# Patient Record
Sex: MALE | Race: WHITE | NOT HISPANIC OR LATINO | URBAN - METROPOLITAN AREA
[De-identification: names, ages, dates, MRNs, and addresses within clinical notes are randomized per-mention and may not be internally consistent; named-entity substitution may affect disease eponyms.]

---

## 2017-01-19 ENCOUNTER — OUTPATIENT (OUTPATIENT)
Dept: OUTPATIENT SERVICES | Facility: HOSPITAL | Age: 82
LOS: 1 days | End: 2017-01-19

## 2017-01-19 DIAGNOSIS — Z98.89 OTHER SPECIFIED POSTPROCEDURAL STATES: Chronic | ICD-10-CM

## 2017-01-27 DIAGNOSIS — Z01.20 ENCOUNTER FOR DENTAL EXAMINATION AND CLEANING WITHOUT ABNORMAL FINDINGS: ICD-10-CM

## 2017-02-01 ENCOUNTER — OUTPATIENT (OUTPATIENT)
Dept: OUTPATIENT SERVICES | Facility: HOSPITAL | Age: 82
LOS: 1 days | End: 2017-02-01

## 2017-02-01 DIAGNOSIS — Z98.89 OTHER SPECIFIED POSTPROCEDURAL STATES: Chronic | ICD-10-CM

## 2017-02-15 DIAGNOSIS — Z01.20 ENCOUNTER FOR DENTAL EXAMINATION AND CLEANING WITHOUT ABNORMAL FINDINGS: ICD-10-CM

## 2017-03-03 ENCOUNTER — OUTPATIENT (OUTPATIENT)
Dept: OUTPATIENT SERVICES | Facility: HOSPITAL | Age: 82
LOS: 1 days | End: 2017-03-03

## 2017-03-03 DIAGNOSIS — Z98.89 OTHER SPECIFIED POSTPROCEDURAL STATES: Chronic | ICD-10-CM

## 2017-04-03 DIAGNOSIS — Z01.20 ENCOUNTER FOR DENTAL EXAMINATION AND CLEANING WITHOUT ABNORMAL FINDINGS: ICD-10-CM

## 2017-06-22 ENCOUNTER — INPATIENT (INPATIENT)
Facility: HOSPITAL | Age: 82
LOS: 7 days | Discharge: HOME | DRG: 261 | End: 2017-06-30
Attending: INTERNAL MEDICINE | Admitting: INTERNAL MEDICINE
Payer: MEDICARE

## 2017-06-22 VITALS
RESPIRATION RATE: 16 BRPM | TEMPERATURE: 98 F | SYSTOLIC BLOOD PRESSURE: 116 MMHG | OXYGEN SATURATION: 96 % | HEART RATE: 51 BPM | DIASTOLIC BLOOD PRESSURE: 75 MMHG

## 2017-06-22 DIAGNOSIS — K21.9 GASTRO-ESOPHAGEAL REFLUX DISEASE WITHOUT ESOPHAGITIS: ICD-10-CM

## 2017-06-22 DIAGNOSIS — R00.1 BRADYCARDIA, UNSPECIFIED: ICD-10-CM

## 2017-06-22 DIAGNOSIS — G30.8 OTHER ALZHEIMER'S DISEASE: ICD-10-CM

## 2017-06-22 DIAGNOSIS — N20.0 CALCULUS OF KIDNEY: ICD-10-CM

## 2017-06-22 DIAGNOSIS — E78.5 HYPERLIPIDEMIA, UNSPECIFIED: ICD-10-CM

## 2017-06-22 DIAGNOSIS — Z98.89 OTHER SPECIFIED POSTPROCEDURAL STATES: Chronic | ICD-10-CM

## 2017-06-22 DIAGNOSIS — Z29.9 ENCOUNTER FOR PROPHYLACTIC MEASURES, UNSPECIFIED: ICD-10-CM

## 2017-06-22 DIAGNOSIS — N20.0 CALCULUS OF KIDNEY: Chronic | ICD-10-CM

## 2017-06-22 LAB
ALBUMIN SERPL ELPH-MCNC: 4.1 G/DL — SIGNIFICANT CHANGE UP (ref 3.3–5)
ALP SERPL-CCNC: 65 U/L — SIGNIFICANT CHANGE UP (ref 40–120)
ALT FLD-CCNC: 46 U/L RC — HIGH (ref 10–45)
ANION GAP SERPL CALC-SCNC: 12 MMOL/L — SIGNIFICANT CHANGE UP (ref 5–17)
APTT BLD: 34.7 SEC — SIGNIFICANT CHANGE UP (ref 27.5–37.4)
AST SERPL-CCNC: 29 U/L — SIGNIFICANT CHANGE UP (ref 10–40)
BASE EXCESS BLDV CALC-SCNC: 3.1 MMOL/L — HIGH (ref -2–2)
BASOPHILS # BLD AUTO: 0.1 K/UL — SIGNIFICANT CHANGE UP (ref 0–0.2)
BASOPHILS NFR BLD AUTO: 0.8 % — SIGNIFICANT CHANGE UP (ref 0–2)
BILIRUB SERPL-MCNC: 0.5 MG/DL — SIGNIFICANT CHANGE UP (ref 0.2–1.2)
BUN SERPL-MCNC: 21 MG/DL — SIGNIFICANT CHANGE UP (ref 7–23)
CA-I SERPL-SCNC: 1.3 MMOL/L — SIGNIFICANT CHANGE UP (ref 1.12–1.3)
CALCIUM SERPL-MCNC: 9.8 MG/DL — SIGNIFICANT CHANGE UP (ref 8.4–10.5)
CHLORIDE BLDV-SCNC: 106 MMOL/L — SIGNIFICANT CHANGE UP (ref 96–108)
CHLORIDE SERPL-SCNC: 106 MMOL/L — SIGNIFICANT CHANGE UP (ref 96–108)
CO2 BLDV-SCNC: 32 MMOL/L — HIGH (ref 22–30)
CO2 SERPL-SCNC: 26 MMOL/L — SIGNIFICANT CHANGE UP (ref 22–31)
CREAT SERPL-MCNC: 1.14 MG/DL — SIGNIFICANT CHANGE UP (ref 0.5–1.3)
EOSINOPHIL # BLD AUTO: 0.1 K/UL — SIGNIFICANT CHANGE UP (ref 0–0.5)
EOSINOPHIL NFR BLD AUTO: 1 % — SIGNIFICANT CHANGE UP (ref 0–6)
GAS PNL BLDV: 141 MMOL/L — SIGNIFICANT CHANGE UP (ref 136–145)
GAS PNL BLDV: SIGNIFICANT CHANGE UP
GAS PNL BLDV: SIGNIFICANT CHANGE UP
GLUCOSE BLDV-MCNC: 84 MG/DL — SIGNIFICANT CHANGE UP (ref 70–99)
GLUCOSE SERPL-MCNC: 89 MG/DL — SIGNIFICANT CHANGE UP (ref 70–99)
HCO3 BLDV-SCNC: 30 MMOL/L — HIGH (ref 21–29)
HCT VFR BLD CALC: 43.3 % — SIGNIFICANT CHANGE UP (ref 39–50)
HCT VFR BLDA CALC: 46 % — SIGNIFICANT CHANGE UP (ref 39–50)
HGB BLD CALC-MCNC: 15.1 G/DL — SIGNIFICANT CHANGE UP (ref 13–17)
HGB BLD-MCNC: 14.9 G/DL — SIGNIFICANT CHANGE UP (ref 13–17)
INR BLD: 1.07 RATIO — SIGNIFICANT CHANGE UP (ref 0.88–1.16)
LACTATE BLDV-MCNC: 1.6 MMOL/L — SIGNIFICANT CHANGE UP (ref 0.7–2)
LYMPHOCYTES # BLD AUTO: 2.1 K/UL — SIGNIFICANT CHANGE UP (ref 1–3.3)
LYMPHOCYTES # BLD AUTO: 30.9 % — SIGNIFICANT CHANGE UP (ref 13–44)
MCHC RBC-ENTMCNC: 34.3 GM/DL — SIGNIFICANT CHANGE UP (ref 32–36)
MCHC RBC-ENTMCNC: 35.5 PG — HIGH (ref 27–34)
MCV RBC AUTO: 103 FL — HIGH (ref 80–100)
MONOCYTES # BLD AUTO: 0.8 K/UL — SIGNIFICANT CHANGE UP (ref 0–0.9)
MONOCYTES NFR BLD AUTO: 11.1 % — SIGNIFICANT CHANGE UP (ref 2–14)
NEUTROPHILS # BLD AUTO: 3.9 K/UL — SIGNIFICANT CHANGE UP (ref 1.8–7.4)
NEUTROPHILS NFR BLD AUTO: 56.3 % — SIGNIFICANT CHANGE UP (ref 43–77)
NT-PROBNP SERPL-SCNC: 66 PG/ML — SIGNIFICANT CHANGE UP (ref 0–300)
PCO2 BLDV: 58 MMHG — HIGH (ref 35–50)
PH BLDV: 7.33 — LOW (ref 7.35–7.45)
PLATELET # BLD AUTO: 168 K/UL — SIGNIFICANT CHANGE UP (ref 150–400)
PO2 BLDV: 21 MMHG — LOW (ref 25–45)
POTASSIUM BLDV-SCNC: 4.8 MMOL/L — SIGNIFICANT CHANGE UP (ref 3.5–5)
POTASSIUM SERPL-MCNC: 5.1 MMOL/L — SIGNIFICANT CHANGE UP (ref 3.5–5.3)
POTASSIUM SERPL-SCNC: 5.1 MMOL/L — SIGNIFICANT CHANGE UP (ref 3.5–5.3)
PROT SERPL-MCNC: 7.2 G/DL — SIGNIFICANT CHANGE UP (ref 6–8.3)
PROTHROM AB SERPL-ACNC: 11.7 SEC — SIGNIFICANT CHANGE UP (ref 9.8–12.7)
RBC # BLD: 4.19 M/UL — LOW (ref 4.2–5.8)
RBC # FLD: 12.2 % — SIGNIFICANT CHANGE UP (ref 10.3–14.5)
SAO2 % BLDV: 24 % — LOW (ref 67–88)
SODIUM SERPL-SCNC: 144 MMOL/L — SIGNIFICANT CHANGE UP (ref 135–145)
TROPONIN T SERPL-MCNC: <0.01 NG/ML — SIGNIFICANT CHANGE UP (ref 0–0.06)
TROPONIN T SERPL-MCNC: <0.01 NG/ML — SIGNIFICANT CHANGE UP (ref 0–0.06)
WBC # BLD: 6.9 K/UL — SIGNIFICANT CHANGE UP (ref 3.8–10.5)
WBC # FLD AUTO: 6.9 K/UL — SIGNIFICANT CHANGE UP (ref 3.8–10.5)

## 2017-06-22 PROCEDURE — 99223 1ST HOSP IP/OBS HIGH 75: CPT

## 2017-06-22 PROCEDURE — 99284 EMERGENCY DEPT VISIT MOD MDM: CPT | Mod: 25

## 2017-06-22 PROCEDURE — 71010: CPT | Mod: 26

## 2017-06-22 PROCEDURE — 93010 ELECTROCARDIOGRAM REPORT: CPT

## 2017-06-22 RX ORDER — QUETIAPINE FUMARATE 200 MG/1
25 TABLET, FILM COATED ORAL AT BEDTIME
Qty: 0 | Refills: 0 | Status: DISCONTINUED | OUTPATIENT
Start: 2017-06-22 | End: 2017-06-30

## 2017-06-22 RX ORDER — DONEPEZIL HYDROCHLORIDE 10 MG/1
10 TABLET, FILM COATED ORAL AT BEDTIME
Qty: 0 | Refills: 0 | Status: DISCONTINUED | OUTPATIENT
Start: 2017-06-22 | End: 2017-06-22

## 2017-06-22 RX ORDER — FAMOTIDINE 10 MG/ML
20 INJECTION INTRAVENOUS
Qty: 0 | Refills: 0 | Status: DISCONTINUED | OUTPATIENT
Start: 2017-06-22 | End: 2017-06-30

## 2017-06-22 RX ORDER — CHOLECALCIFEROL (VITAMIN D3) 125 MCG
4000 CAPSULE ORAL DAILY
Qty: 0 | Refills: 0 | Status: DISCONTINUED | OUTPATIENT
Start: 2017-06-22 | End: 2017-06-30

## 2017-06-22 RX ORDER — POTASSIUM CHLORIDE 20 MEQ
10 PACKET (EA) ORAL DAILY
Qty: 0 | Refills: 0 | Status: DISCONTINUED | OUTPATIENT
Start: 2017-06-22 | End: 2017-06-30

## 2017-06-22 RX ORDER — HEPARIN SODIUM 5000 [USP'U]/ML
5000 INJECTION INTRAVENOUS; SUBCUTANEOUS EVERY 12 HOURS
Qty: 0 | Refills: 0 | Status: DISCONTINUED | OUTPATIENT
Start: 2017-06-22 | End: 2017-06-30

## 2017-06-22 RX ORDER — ATORVASTATIN CALCIUM 80 MG/1
40 TABLET, FILM COATED ORAL AT BEDTIME
Qty: 0 | Refills: 0 | Status: DISCONTINUED | OUTPATIENT
Start: 2017-06-22 | End: 2017-06-30

## 2017-06-22 RX ADMIN — QUETIAPINE FUMARATE 25 MILLIGRAM(S): 200 TABLET, FILM COATED ORAL at 21:13

## 2017-06-22 RX ADMIN — ATORVASTATIN CALCIUM 40 MILLIGRAM(S): 80 TABLET, FILM COATED ORAL at 21:13

## 2017-06-22 RX ADMIN — HEPARIN SODIUM 5000 UNIT(S): 5000 INJECTION INTRAVENOUS; SUBCUTANEOUS at 21:13

## 2017-06-22 NOTE — H&P ADULT - PROBLEM SELECTOR PLAN 1
Sinus bradycardia: symptoms are questionable, ?intermittent lightheadness. No 2nd degree block or pause yet detected.  -will c/w tele monitoring  -1st troponin negative, will send 2nd set  -send TSH, free T4  -2d echo  -f/u cardio recs from Dr Sanju Paulino  -if worsening daniel cardia or blocks will need CCU eval and possible pacing  -hold aricept for now however unlikely contributing to bradycardia   -atropine prn for symptomatic bradycardia  -FULL CODE Sinus bradycardia: symptoms are questionable, ?intermittent lightheadness. No 2nd degree block or pause yet detected.  -will c/w tele monitoring  -1st troponin negative, will send 2nd set  -send TSH, free T4, mg, phos  -2d echo  -f/u cardio recs from Dr Sanju Paulino  -if worsening daniel cardia or blocks will need CCU eval and possible pacing  -hold aricept for now however unlikely contributing to bradycardia   -atropine prn for symptomatic bradycardia  -FULL CODE

## 2017-06-22 NOTE — ED ADULT NURSE REASSESSMENT NOTE - NS ED NURSE REASSESS COMMENT FT1
Report endorsed to KALYANI Hernandez and pt will be adm to [address] [place] [address] [id number]

## 2017-06-22 NOTE — ED PROVIDER NOTE - CONSTITUTIONAL, MLM
normal... Well appearing,  obese, awake, alert, oriented to person, place, time/situation and in no apparent distress.

## 2017-06-22 NOTE — H&P ADULT - ASSESSMENT
82 y/o M with PMH of mild Alzheimer's Dementia, BPH, GERD, HLD, kidney stones, sent in from PMD for bradycardia, found to be bradycardiac, HD stable, possibly symptomatic.

## 2017-06-22 NOTE — H&P ADULT - NEGATIVE CARDIOVASCULAR SYMPTOMS
no chest pain/no peripheral edema/no dyspnea on exertion/no paroxysmal nocturnal dyspnea/no palpitations

## 2017-06-22 NOTE — ED PROVIDER NOTE - MEDICAL DECISION MAKING DETAILS
83 y old male with hypertension and bradycardia  with ECG sinus bradycardia and first degree h block  asymptomatic now ,will needs blood work ,cardiac enzymes tele admission and possible pacemaker ZR

## 2017-06-22 NOTE — H&P ADULT - HISTORY OF PRESENT ILLNESS
84 y/o M with PMH of mild Alzheimer's Dementia, BPH, GERD, HLD, kidney stones, sent in from PMD for bradycardia. Patient himself denies any chest pain, sob, palpitations, lightheadness, syncope, loc, palpitations, n/v today or on previous days. His wife reports patient has had increasing number of falls in past few months. Occasionally patient tells her he is lightheaded and he does not respond to her and seems to "zone out". No f/c/cough/dysuria. At baseline ambulates with RW.     ED vitals:    HR 97.9, HR 51,  /75, 16, 96% on RA  HE was not given anything in ER. 84 y/o M with PMH of mild Alzheimer's Dementia, BPH, GERD, HLD, kidney stones, sent in from PMD for bradycardia. He went to PMD for checkup and not for any specific compliant or reason. Patient himself denies any chest pain, sob, palpitations, lightheadness, syncope, loc, palpitations, n/v today or on previous days. His wife reports patient has had increasing number of falls in past few months but he denies any pain in hips, legs, head, chest now. Occasionally patient tells her he is lightheaded and he does not respond to her and seems to "zone out". No f/c/cough/dysuria. At baseline ambulates with RW.     ED vitals:    HR 97.9, HR 51,  /75, 16, 96% on RA  HE was not given anything in ER.

## 2017-06-22 NOTE — ED ADULT NURSE NOTE - PMH
Alzheimer disease  with psuedobulbular affect  BPH (benign prostatic hypertrophy)    GERD (gastroesophageal reflux disease)    Hyperlipidemia    Renal calculi

## 2017-06-22 NOTE — H&P ADULT - NSHPLABSRESULTS_GEN_ALL_CORE
personally reviewed labs:  CBC, CMP  trop negative  BNP 66  pH 7.33, 58, 30     personally reviewed EKG: sinus daniel @ 44, 1st degree AV block ()    reviewed tele: sinus daniel in 40-60, no ectopy, pause or block    personally reviewed CXR: clear lungs

## 2017-06-23 DIAGNOSIS — I49.5 SICK SINUS SYNDROME: ICD-10-CM

## 2017-06-23 LAB
ANION GAP SERPL CALC-SCNC: 14 MMOL/L — SIGNIFICANT CHANGE UP (ref 5–17)
BUN SERPL-MCNC: 23 MG/DL — SIGNIFICANT CHANGE UP (ref 7–23)
CALCIUM SERPL-MCNC: 9.4 MG/DL — SIGNIFICANT CHANGE UP (ref 8.4–10.5)
CHLORIDE SERPL-SCNC: 104 MMOL/L — SIGNIFICANT CHANGE UP (ref 96–108)
CO2 SERPL-SCNC: 23 MMOL/L — SIGNIFICANT CHANGE UP (ref 22–31)
CREAT SERPL-MCNC: 1.22 MG/DL — SIGNIFICANT CHANGE UP (ref 0.5–1.3)
GLUCOSE SERPL-MCNC: 84 MG/DL — SIGNIFICANT CHANGE UP (ref 70–99)
HCT VFR BLD CALC: 43.6 % — SIGNIFICANT CHANGE UP (ref 39–50)
HGB BLD-MCNC: 14.7 G/DL — SIGNIFICANT CHANGE UP (ref 13–17)
MAGNESIUM SERPL-MCNC: 1.9 MG/DL — SIGNIFICANT CHANGE UP (ref 1.6–2.6)
MCHC RBC-ENTMCNC: 33.7 GM/DL — SIGNIFICANT CHANGE UP (ref 32–36)
MCHC RBC-ENTMCNC: 34.5 PG — HIGH (ref 27–34)
MCV RBC AUTO: 102 FL — HIGH (ref 80–100)
PHOSPHATE SERPL-MCNC: 3.6 MG/DL — SIGNIFICANT CHANGE UP (ref 2.5–4.5)
PLATELET # BLD AUTO: 156 K/UL — SIGNIFICANT CHANGE UP (ref 150–400)
POTASSIUM SERPL-MCNC: 4.2 MMOL/L — SIGNIFICANT CHANGE UP (ref 3.5–5.3)
POTASSIUM SERPL-SCNC: 4.2 MMOL/L — SIGNIFICANT CHANGE UP (ref 3.5–5.3)
RBC # BLD: 4.27 M/UL — SIGNIFICANT CHANGE UP (ref 4.2–5.8)
RBC # FLD: 11.9 % — SIGNIFICANT CHANGE UP (ref 10.3–14.5)
SODIUM SERPL-SCNC: 141 MMOL/L — SIGNIFICANT CHANGE UP (ref 135–145)
T4 AB SER-ACNC: 5.5 UG/DL — SIGNIFICANT CHANGE UP (ref 4.6–12)
TSH SERPL-MCNC: 1.68 UIU/ML — SIGNIFICANT CHANGE UP (ref 0.27–4.2)
WBC # BLD: 6.8 K/UL — SIGNIFICANT CHANGE UP (ref 3.8–10.5)
WBC # FLD AUTO: 6.8 K/UL — SIGNIFICANT CHANGE UP (ref 3.8–10.5)

## 2017-06-23 PROCEDURE — 93880 EXTRACRANIAL BILAT STUDY: CPT | Mod: 26

## 2017-06-23 PROCEDURE — 93010 ELECTROCARDIOGRAM REPORT: CPT

## 2017-06-23 PROCEDURE — 93970 EXTREMITY STUDY: CPT | Mod: 26

## 2017-06-23 PROCEDURE — 99223 1ST HOSP IP/OBS HIGH 75: CPT

## 2017-06-23 PROCEDURE — 93923 UPR/LXTR ART STDY 3+ LVLS: CPT | Mod: 26

## 2017-06-23 RX ADMIN — HEPARIN SODIUM 5000 UNIT(S): 5000 INJECTION INTRAVENOUS; SUBCUTANEOUS at 05:06

## 2017-06-23 RX ADMIN — HEPARIN SODIUM 5000 UNIT(S): 5000 INJECTION INTRAVENOUS; SUBCUTANEOUS at 18:13

## 2017-06-23 RX ADMIN — ATORVASTATIN CALCIUM 40 MILLIGRAM(S): 80 TABLET, FILM COATED ORAL at 22:16

## 2017-06-23 RX ADMIN — Medication 4000 UNIT(S): at 12:30

## 2017-06-23 RX ADMIN — FAMOTIDINE 20 MILLIGRAM(S): 10 INJECTION INTRAVENOUS at 05:06

## 2017-06-23 RX ADMIN — FAMOTIDINE 20 MILLIGRAM(S): 10 INJECTION INTRAVENOUS at 18:13

## 2017-06-23 RX ADMIN — Medication 10 MILLIEQUIVALENT(S): at 12:30

## 2017-06-23 RX ADMIN — QUETIAPINE FUMARATE 25 MILLIGRAM(S): 200 TABLET, FILM COATED ORAL at 22:16

## 2017-06-23 NOTE — CHART NOTE - NSCHARTNOTEFT_GEN_A_CORE
Patient is a 83y old  Male who presents with a chief complaint of sent in for bradycardia from pmd (22 Jun 2017 18:33). Notified by RN that heart rate is 43 on telemetry. Patient admitted with bradycardia. Trend on telemetry is in the 40s-50s. Patient seen and evaluated at the bedside. Asymptomatic, laying in bed. Denies dizziness, lightheadedness, weakness and fatigue.       Vital Signs Last 24 Hrs  T(C): 36.5, Max: 36.9 (06-22 @ 20:56)  T(F): 97.7, Max: 98.4 (06-22 @ 20:56)  HR: 44 (44 - 54)  BP: 109/69 (109/69 - 138/69)  BP(mean): --  RR: 18 (13 - 18)  SpO2: 96% (96% - 98%)                        14.9   6.9   )-----------( 168      ( 22 Jun 2017 16:43 )             43.3     06-22    144  |  106  |  21  ----------------------------<  89  5.1   |  26  |  1.14    Ca    9.8      22 Jun 2017 16:43    TPro  7.2  /  Alb  4.1  /  TBili  0.5  /  DBili  x   /  AST  29  /  ALT  46<H>  /  AlkPhos  65  06-22    PT/INR - ( 22 Jun 2017 16:43 )   PT: 11.7 sec;   INR: 1.07 ratio         PTT - ( 22 Jun 2017 16:43 )  PTT:34.7 sec    General:  NAD  Neurology: A&Ox3  Respiratory: CTA B/L  CV: RR, bradycardic, S1S2, no murmur  Abdominal: Soft, NT, ND no palpable mass      A/P  HPI:  84 y/o M with PMH of mild Alzheimer's Dementia, BPH, GERD, HLD, kidney stones, sent in from PMD for bradycardia. He went to PMD for checkup and not for any specific compliant or reason. Patient himself denies any chest pain, sob, palpitations, lightheadness, syncope, loc, palpitations, n/v today or on previous days. His wife reports patient has had increasing number of falls in past few months but he denies any pain in hips, legs, head, chest now. Occasionally patient tells her he is lightheaded and he does not respond to her and seems to "zone out". No f/c/cough/dysuria. At baseline ambulates with RW.     ED vitals:    HR 97.9, HR 51,  /75, 16, 96% on RA  HE was not given anything in ER. (22 Jun 2017 18:33)  Now with a heart rate of 43 on telemetry.   1) Continue to monitor on telemetry.   2) EKG obtained- sinus bradycardia, no change noted from last EKG in the chart September 2016.   3) Will continue to monitor and follow up with primary team in the AM.       Dorothea Little PA-C   [phone] Patient is a 83y old  Male who presents with a chief complaint of sent in for bradycardia from pmd (22 Jun 2017 18:33). Notified by RN that heart rate is 43 on telemetry. Patient admitted with bradycardia. Trend on telemetry is in the 40s-50s. Patient seen and evaluated at the bedside. Asymptomatic, laying in bed. Denies dizziness, lightheadedness, weakness and fatigue.       Vital Signs Last 24 Hrs  T(C): 36.5, Max: 36.9 (06-22 @ 20:56)  T(F): 97.7, Max: 98.4 (06-22 @ 20:56)  HR: 44 (44 - 54)  BP: 109/69 (109/69 - 138/69)  BP(mean): --  RR: 18 (13 - 18)  SpO2: 96% (96% - 98%)                        14.9   6.9   )-----------( 168      ( 22 Jun 2017 16:43 )             43.3     06-22    144  |  106  |  21  ----------------------------<  89  5.1   |  26  |  1.14    Ca    9.8      22 Jun 2017 16:43    TPro  7.2  /  Alb  4.1  /  TBili  0.5  /  DBili  x   /  AST  29  /  ALT  46<H>  /  AlkPhos  65  06-22    PT/INR - ( 22 Jun 2017 16:43 )   PT: 11.7 sec;   INR: 1.07 ratio         PTT - ( 22 Jun 2017 16:43 )  PTT:34.7 sec    General:  NAD  Neurology: A&Ox3  Respiratory: CTA B/L  CV: RR, bradycardic, S1S2, no murmur  Abdominal: Soft, NT, ND no palpable mass      A/P  HPI:  84 y/o M with PMH of mild Alzheimer's Dementia, BPH, GERD, HLD, kidney stones, sent in from PMD for bradycardia. He went to PMD for checkup and not for any specific compliant or reason. Patient himself denies any chest pain, sob, palpitations, lightheadness, syncope, loc, palpitations, n/v today or on previous days. His wife reports patient has had increasing number of falls in past few months but he denies any pain in hips, legs, head, chest now. Occasionally patient tells her he is lightheaded and he does not respond to her and seems to "zone out". No f/c/cough/dysuria. At baseline ambulates with RW.     ED vitals:    HR 97.9, HR 51,  /75, 16, 96% on RA  HE was not given anything in ER. (22 Jun 2017 18:33)  Now with a heart rate of 43 on telemetry.   1) Continue to monitor on telemetry.   2) EKG obtained- sinus bradycardia, no change noted from last EKG in the chart September 2016.   3) Will continue to monitor and follow up with primary team in the AM.       Dorothea Little PA-C   [phone]    Addendum:   Notified by RN that patient went bradycardic to 39 briefly on telemetry monitoring. Patient seen at the bedside. Asymptomatic. Denies dizziness, lightheadedness and weakness. Exam as above. Vital Signs Last 24 Hrs  T(C): 36.6, Max: 36.9 (06-22 @ 20:56)  T(F): 97.8, Max: 98.4 (06-22 @ 20:56)  HR: 43 (43 - 54)  BP: 107/68 (107/68 - 138/69)  BP(mean): --  RR: 18 (13 - 18)  SpO2: 94% (94% - 98%)  Continue to monitor on telemetry.   R2 pads placed on the patient.   Will endorse to primary team in the AM.   Dorothea Little PA-C [id number] Patient is a 83y old  Male who presents with a chief complaint of sent in for bradycardia from pmd (22 Jun 2017 18:33). Notified by RN that heart rate is 43 on telemetry. Patient admitted with bradycardia. Trend on telemetry is in the 40s-50s. Patient seen and evaluated at the bedside. Asymptomatic, laying in bed. Denies dizziness, lightheadedness, weakness and fatigue.       Vital Signs Last 24 Hrs  T(C): 36.5, Max: 36.9 (06-22 @ 20:56)  T(F): 97.7, Max: 98.4 (06-22 @ 20:56)  HR: 44 (44 - 54)  BP: 109/69 (109/69 - 138/69)  BP(mean): --  RR: 18 (13 - 18)  SpO2: 96% (96% - 98%)                        14.9   6.9   )-----------( 168      ( 22 Jun 2017 16:43 )             43.3     06-22    144  |  106  |  21  ----------------------------<  89  5.1   |  26  |  1.14    Ca    9.8      22 Jun 2017 16:43    TPro  7.2  /  Alb  4.1  /  TBili  0.5  /  DBili  x   /  AST  29  /  ALT  46<H>  /  AlkPhos  65  06-22    PT/INR - ( 22 Jun 2017 16:43 )   PT: 11.7 sec;   INR: 1.07 ratio         PTT - ( 22 Jun 2017 16:43 )  PTT:34.7 sec    General:  NAD  Neurology: A&Ox3  Respiratory: CTA B/L  CV: RR, bradycardic, S1S2, no murmur  Abdominal: Soft, NT, ND no palpable mass      A/P  HPI:  84 y/o M with PMH of mild Alzheimer's Dementia, BPH, GERD, HLD, kidney stones, sent in from PMD for bradycardia. He went to PMD for checkup and not for any specific compliant or reason. Patient himself denies any chest pain, sob, palpitations, lightheadness, syncope, loc, palpitations, n/v today or on previous days. His wife reports patient has had increasing number of falls in past few months but he denies any pain in hips, legs, head, chest now. Occasionally patient tells her he is lightheaded and he does not respond to her and seems to "zone out". No f/c/cough/dysuria. At baseline ambulates with RW.     ED vitals:    HR 97.9, HR 51,  /75, 16, 96% on RA  HE was not given anything in ER. (22 Jun 2017 18:33)  Now with a heart rate of 43 on telemetry.   1) Continue to monitor on telemetry.   2) EKG obtained- sinus bradycardia, no change noted from last EKG in the chart September 2016.   3) Will continue to monitor and follow up with primary team in the AM.       Dorothea Little PA-C   [phone]    Addendum:   Notified by RN that patient went bradycardic to 39 briefly on telemetry monitoring. Patient seen at the bedside. Asymptomatic. Denies dizziness, lightheadedness and weakness. Exam as above. Vital Signs Last 24 Hrs  T(C): 36.6, Max: 36.9 (06-22 @ 20:56)  T(F): 97.8, Max: 98.4 (06-22 @ 20:56)  HR: 43 (43 - 54)  BP: 107/68 (107/68 - 138/69)  BP(mean): --  RR: 18 (13 - 18)  SpO2: 94% (94% - 98%)  Continue to monitor on telemetry.   R2 pads placed on the patient.   Will endorse to primary team in the AM.   Dorothea Little PA-C [id number]    Detailed message in regards to bradycardia episode left with Dr. Tafoya answering service.   Dorothea Little PA-C [id number]

## 2017-06-23 NOTE — PROVIDER CONTACT NOTE (OTHER) - ASSESSMENT
Pt sleeping, asymptomatic, denies any lightheadedness, fatigue, SOB. VS are /69, HR 44, PulseOx 94%, Temp 97.7, RR 17
VSS. asymptomatic, pt. was sleeping, HR 40s-50s

## 2017-06-23 NOTE — CONSULT NOTE ADULT - PROBLEM SELECTOR RECOMMENDATION 9
Patient with sinus bradycardia. He is a poor historian so difficult to determine if truly symptomatic. As per wife's history it sounds like he is. Continue tele monitor. Check 2-d echo. Hold all AV deann blockers. EP evaluation.

## 2017-06-23 NOTE — PROVIDER CONTACT NOTE (OTHER) - BACKGROUND
admitted with bradycardia
Pt admitted with bradycardia, has a history of alzheimer's, dementia, renal calculi, GERD, BPH.

## 2017-06-24 LAB
ANION GAP SERPL CALC-SCNC: 15 MMOL/L — SIGNIFICANT CHANGE UP (ref 5–17)
BASOPHILS # BLD AUTO: 0 K/UL — SIGNIFICANT CHANGE UP (ref 0–0.2)
BASOPHILS NFR BLD AUTO: 0.6 % — SIGNIFICANT CHANGE UP (ref 0–2)
BUN SERPL-MCNC: 27 MG/DL — HIGH (ref 7–23)
CALCIUM SERPL-MCNC: 9.5 MG/DL — SIGNIFICANT CHANGE UP (ref 8.4–10.5)
CHLORIDE SERPL-SCNC: 104 MMOL/L — SIGNIFICANT CHANGE UP (ref 96–108)
CO2 SERPL-SCNC: 23 MMOL/L — SIGNIFICANT CHANGE UP (ref 22–31)
CREAT SERPL-MCNC: 1.32 MG/DL — HIGH (ref 0.5–1.3)
EOSINOPHIL # BLD AUTO: 0.1 K/UL — SIGNIFICANT CHANGE UP (ref 0–0.5)
EOSINOPHIL NFR BLD AUTO: 2 % — SIGNIFICANT CHANGE UP (ref 0–6)
GLUCOSE SERPL-MCNC: 84 MG/DL — SIGNIFICANT CHANGE UP (ref 70–99)
HCT VFR BLD CALC: 45.7 % — SIGNIFICANT CHANGE UP (ref 39–50)
HGB BLD-MCNC: 14.9 G/DL — SIGNIFICANT CHANGE UP (ref 13–17)
LYMPHOCYTES # BLD AUTO: 2.3 K/UL — SIGNIFICANT CHANGE UP (ref 1–3.3)
LYMPHOCYTES # BLD AUTO: 35.9 % — SIGNIFICANT CHANGE UP (ref 13–44)
MAGNESIUM SERPL-MCNC: 1.9 MG/DL — SIGNIFICANT CHANGE UP (ref 1.6–2.6)
MCHC RBC-ENTMCNC: 32.6 GM/DL — SIGNIFICANT CHANGE UP (ref 32–36)
MCHC RBC-ENTMCNC: 33.1 PG — SIGNIFICANT CHANGE UP (ref 27–34)
MCV RBC AUTO: 101 FL — HIGH (ref 80–100)
MONOCYTES # BLD AUTO: 0.7 K/UL — SIGNIFICANT CHANGE UP (ref 0–0.9)
MONOCYTES NFR BLD AUTO: 10.8 % — SIGNIFICANT CHANGE UP (ref 2–14)
NEUTROPHILS # BLD AUTO: 3.2 K/UL — SIGNIFICANT CHANGE UP (ref 1.8–7.4)
NEUTROPHILS NFR BLD AUTO: 50.7 % — SIGNIFICANT CHANGE UP (ref 43–77)
PHOSPHATE SERPL-MCNC: 4 MG/DL — SIGNIFICANT CHANGE UP (ref 2.5–4.5)
PLATELET # BLD AUTO: 154 K/UL — SIGNIFICANT CHANGE UP (ref 150–400)
POTASSIUM SERPL-MCNC: 4.1 MMOL/L — SIGNIFICANT CHANGE UP (ref 3.5–5.3)
POTASSIUM SERPL-SCNC: 4.1 MMOL/L — SIGNIFICANT CHANGE UP (ref 3.5–5.3)
RBC # BLD: 4.5 M/UL — SIGNIFICANT CHANGE UP (ref 4.2–5.8)
RBC # FLD: 11.7 % — SIGNIFICANT CHANGE UP (ref 10.3–14.5)
SODIUM SERPL-SCNC: 142 MMOL/L — SIGNIFICANT CHANGE UP (ref 135–145)
WBC # BLD: 6.4 K/UL — SIGNIFICANT CHANGE UP (ref 3.8–10.5)
WBC # FLD AUTO: 6.4 K/UL — SIGNIFICANT CHANGE UP (ref 3.8–10.5)

## 2017-06-24 PROCEDURE — 99232 SBSQ HOSP IP/OBS MODERATE 35: CPT

## 2017-06-24 RX ADMIN — HEPARIN SODIUM 5000 UNIT(S): 5000 INJECTION INTRAVENOUS; SUBCUTANEOUS at 05:38

## 2017-06-24 RX ADMIN — Medication 4000 UNIT(S): at 11:38

## 2017-06-24 RX ADMIN — HEPARIN SODIUM 5000 UNIT(S): 5000 INJECTION INTRAVENOUS; SUBCUTANEOUS at 17:58

## 2017-06-24 RX ADMIN — FAMOTIDINE 20 MILLIGRAM(S): 10 INJECTION INTRAVENOUS at 05:38

## 2017-06-24 RX ADMIN — ATORVASTATIN CALCIUM 40 MILLIGRAM(S): 80 TABLET, FILM COATED ORAL at 21:19

## 2017-06-24 RX ADMIN — Medication 10 MILLIEQUIVALENT(S): at 11:39

## 2017-06-24 RX ADMIN — QUETIAPINE FUMARATE 25 MILLIGRAM(S): 200 TABLET, FILM COATED ORAL at 21:19

## 2017-06-24 RX ADMIN — FAMOTIDINE 20 MILLIGRAM(S): 10 INJECTION INTRAVENOUS at 17:58

## 2017-06-25 LAB
ANION GAP SERPL CALC-SCNC: 14 MMOL/L — SIGNIFICANT CHANGE UP (ref 5–17)
BUN SERPL-MCNC: 24 MG/DL — HIGH (ref 7–23)
CALCIUM SERPL-MCNC: 9.8 MG/DL — SIGNIFICANT CHANGE UP (ref 8.4–10.5)
CHLORIDE SERPL-SCNC: 104 MMOL/L — SIGNIFICANT CHANGE UP (ref 96–108)
CO2 SERPL-SCNC: 23 MMOL/L — SIGNIFICANT CHANGE UP (ref 22–31)
CREAT SERPL-MCNC: 1.27 MG/DL — SIGNIFICANT CHANGE UP (ref 0.5–1.3)
GLUCOSE SERPL-MCNC: 87 MG/DL — SIGNIFICANT CHANGE UP (ref 70–99)
HCT VFR BLD CALC: 45 % — SIGNIFICANT CHANGE UP (ref 39–50)
HGB BLD-MCNC: 15.5 G/DL — SIGNIFICANT CHANGE UP (ref 13–17)
MAGNESIUM SERPL-MCNC: 2 MG/DL — SIGNIFICANT CHANGE UP (ref 1.6–2.6)
MCHC RBC-ENTMCNC: 34.4 GM/DL — SIGNIFICANT CHANGE UP (ref 32–36)
MCHC RBC-ENTMCNC: 35 PG — HIGH (ref 27–34)
MCV RBC AUTO: 102 FL — HIGH (ref 80–100)
PHOSPHATE SERPL-MCNC: 3.2 MG/DL — SIGNIFICANT CHANGE UP (ref 2.5–4.5)
PLATELET # BLD AUTO: 145 K/UL — LOW (ref 150–400)
POTASSIUM SERPL-MCNC: 4.2 MMOL/L — SIGNIFICANT CHANGE UP (ref 3.5–5.3)
POTASSIUM SERPL-SCNC: 4.2 MMOL/L — SIGNIFICANT CHANGE UP (ref 3.5–5.3)
RBC # BLD: 4.43 M/UL — SIGNIFICANT CHANGE UP (ref 4.2–5.8)
RBC # FLD: 11.8 % — SIGNIFICANT CHANGE UP (ref 10.3–14.5)
SODIUM SERPL-SCNC: 141 MMOL/L — SIGNIFICANT CHANGE UP (ref 135–145)
WBC # BLD: 7 K/UL — SIGNIFICANT CHANGE UP (ref 3.8–10.5)
WBC # FLD AUTO: 7 K/UL — SIGNIFICANT CHANGE UP (ref 3.8–10.5)

## 2017-06-25 PROCEDURE — 99232 SBSQ HOSP IP/OBS MODERATE 35: CPT

## 2017-06-25 PROCEDURE — 72170 X-RAY EXAM OF PELVIS: CPT | Mod: 26

## 2017-06-25 RX ADMIN — Medication 10 MILLIEQUIVALENT(S): at 10:15

## 2017-06-25 RX ADMIN — ATORVASTATIN CALCIUM 40 MILLIGRAM(S): 80 TABLET, FILM COATED ORAL at 21:15

## 2017-06-25 RX ADMIN — QUETIAPINE FUMARATE 25 MILLIGRAM(S): 200 TABLET, FILM COATED ORAL at 21:15

## 2017-06-25 RX ADMIN — FAMOTIDINE 20 MILLIGRAM(S): 10 INJECTION INTRAVENOUS at 05:53

## 2017-06-25 RX ADMIN — HEPARIN SODIUM 5000 UNIT(S): 5000 INJECTION INTRAVENOUS; SUBCUTANEOUS at 05:53

## 2017-06-25 RX ADMIN — FAMOTIDINE 20 MILLIGRAM(S): 10 INJECTION INTRAVENOUS at 17:00

## 2017-06-25 RX ADMIN — HEPARIN SODIUM 5000 UNIT(S): 5000 INJECTION INTRAVENOUS; SUBCUTANEOUS at 17:00

## 2017-06-25 RX ADMIN — Medication 4000 UNIT(S): at 10:15

## 2017-06-25 NOTE — CHART NOTE - NSCHARTNOTEFT_GEN_A_CORE
Medicine Night PA Episodic  4:20 AM 6/25    Notified by RN pt s/p unwitnessed fall, found on the ground. Pt. seen and examined, resting in bed comfortable, NAD. States was trying to go to the bathroom and bent down on floor to  piece of paper and fell. Pt. denies LOC, dizziness, hitting head, numbness/tingling, cp, palpitations, dyspnea, headache. Full fall assessment examination and evaluation in chart, pt. with no concerning findings.  Pt. noted to have small skin tear noted to left hand between 1st and 2nd digit - dressed appropriately. Urgent Pelvis XR done - no acute/emergent findings. Family and attending updated on pt. change in status. Will continue to monitor and f/u with primary day team in AM.    CARLOZ RitterC  #[id number] Medicine Night PA Episodic  4:20 AM 6/25    Notified by RN pt s/p unwitnessed fall, found on the ground. Pt. seen and examined, resting in bed comfortable, NAD. States was trying to go to the bathroom and bent down on floor to  piece of paper and fell. Pt. denies LOC, dizziness, hitting head, numbness/tingling, cp, palpitations, dyspnea, headache. Full fall assessment examination and evaluation in chart, pt. with no concerning findings.  Pt. noted to have small skin tear noted to left hand between 1st and 2nd digit - dressed appropriately. Urgent Pelvis XR done - no acute/emergent findings. Family updated and Dr. Tafoya paged x 1, awaiting response. Will continue to monitor and f/u with primary day team in AM.    Aleyda Bradford PA-C  #[id number]

## 2017-06-25 NOTE — PHYSICAL THERAPY INITIAL EVALUATION ADULT - ADDITIONAL COMMENTS
Patient reports he has had occasional number of falls in past few month. At baseline ambulates with Rollator and requires to sit down every 1-2 blocks. Pt. resides with family, states he is indep with functional mobility and son assists with Instrumental ADLs (cooking/cleaning/ showering). Has stairs but does not need to negotiate the stairs as bedroom and bathroom are on 1st floor.

## 2017-06-25 NOTE — PHYSICAL THERAPY INITIAL EVALUATION ADULT - PERTINENT HX OF CURRENT PROBLEM, REHAB EVAL
84 y/o M with PMH of mild Alzheimer's Dementia, BPH, GERD, HLD, kidney stones, sent in from PMD for bradycardia.

## 2017-06-25 NOTE — PHYSICAL THERAPY INITIAL EVALUATION ADULT - MANUAL MUSCLE TESTING RESULTS, REHAB EVAL
no strength deficits were identified/4/5 Throughout all extremities no strength deficits were identified/3+/5 Throughout all extremities

## 2017-06-25 NOTE — PHYSICAL THERAPY INITIAL EVALUATION ADULT - DISCHARGE DISPOSITION, PT EVAL
Home with home PT for gait, endurance & strength training and to return pt to baseline functional mobility status. Pt owns rollator. Supervision/assist with mobility skills at this time (pt states Son provides)./home w/ home PT

## 2017-06-26 ENCOUNTER — TRANSCRIPTION ENCOUNTER (OUTPATIENT)
Age: 82
End: 2017-06-26

## 2017-06-26 LAB
ANION GAP SERPL CALC-SCNC: 14 MMOL/L — SIGNIFICANT CHANGE UP (ref 5–17)
BUN SERPL-MCNC: 24 MG/DL — HIGH (ref 7–23)
CALCIUM SERPL-MCNC: 9.6 MG/DL — SIGNIFICANT CHANGE UP (ref 8.4–10.5)
CHLORIDE SERPL-SCNC: 104 MMOL/L — SIGNIFICANT CHANGE UP (ref 96–108)
CO2 SERPL-SCNC: 22 MMOL/L — SIGNIFICANT CHANGE UP (ref 22–31)
CREAT SERPL-MCNC: 1.34 MG/DL — HIGH (ref 0.5–1.3)
GLUCOSE SERPL-MCNC: 90 MG/DL — SIGNIFICANT CHANGE UP (ref 70–99)
HCT VFR BLD CALC: 47.5 % — SIGNIFICANT CHANGE UP (ref 39–50)
HGB BLD-MCNC: 15.9 G/DL — SIGNIFICANT CHANGE UP (ref 13–17)
MAGNESIUM SERPL-MCNC: 2 MG/DL — SIGNIFICANT CHANGE UP (ref 1.6–2.6)
MCHC RBC-ENTMCNC: 33.5 GM/DL — SIGNIFICANT CHANGE UP (ref 32–36)
MCHC RBC-ENTMCNC: 34.8 PG — HIGH (ref 27–34)
MCV RBC AUTO: 104 FL — HIGH (ref 80–100)
PHOSPHATE SERPL-MCNC: 3.5 MG/DL — SIGNIFICANT CHANGE UP (ref 2.5–4.5)
PLATELET # BLD AUTO: 168 K/UL — SIGNIFICANT CHANGE UP (ref 150–400)
POTASSIUM SERPL-MCNC: 4.3 MMOL/L — SIGNIFICANT CHANGE UP (ref 3.5–5.3)
POTASSIUM SERPL-SCNC: 4.3 MMOL/L — SIGNIFICANT CHANGE UP (ref 3.5–5.3)
RBC # BLD: 4.56 M/UL — SIGNIFICANT CHANGE UP (ref 4.2–5.8)
RBC # FLD: 12.1 % — SIGNIFICANT CHANGE UP (ref 10.3–14.5)
SODIUM SERPL-SCNC: 140 MMOL/L — SIGNIFICANT CHANGE UP (ref 135–145)
WBC # BLD: 8.5 K/UL — SIGNIFICANT CHANGE UP (ref 3.8–10.5)
WBC # FLD AUTO: 8.5 K/UL — SIGNIFICANT CHANGE UP (ref 3.8–10.5)

## 2017-06-26 PROCEDURE — 99232 SBSQ HOSP IP/OBS MODERATE 35: CPT

## 2017-06-26 RX ADMIN — FAMOTIDINE 20 MILLIGRAM(S): 10 INJECTION INTRAVENOUS at 17:31

## 2017-06-26 RX ADMIN — Medication 4000 UNIT(S): at 11:48

## 2017-06-26 RX ADMIN — HEPARIN SODIUM 5000 UNIT(S): 5000 INJECTION INTRAVENOUS; SUBCUTANEOUS at 05:40

## 2017-06-26 RX ADMIN — FAMOTIDINE 20 MILLIGRAM(S): 10 INJECTION INTRAVENOUS at 05:40

## 2017-06-26 RX ADMIN — ATORVASTATIN CALCIUM 40 MILLIGRAM(S): 80 TABLET, FILM COATED ORAL at 21:35

## 2017-06-26 RX ADMIN — Medication 10 MILLIEQUIVALENT(S): at 11:48

## 2017-06-26 RX ADMIN — HEPARIN SODIUM 5000 UNIT(S): 5000 INJECTION INTRAVENOUS; SUBCUTANEOUS at 17:31

## 2017-06-26 RX ADMIN — QUETIAPINE FUMARATE 25 MILLIGRAM(S): 200 TABLET, FILM COATED ORAL at 21:35

## 2017-06-26 NOTE — DISCHARGE NOTE ADULT - ADDITIONAL INSTRUCTIONS
Please have patient follow up with his Cardiologist, Dr Riley, within 1 week of discharge  Please have patient follow up with his Primary Care Doctor, Dr Tafoya, within 2 weeks of discharge  Please have patient follow up with his Neurologist, Dr Moreno, within 2 weeks of discharge Please have patient follow up with his Cardiologist/ Electrophysiologist, Dr Riley, within 1 week of discharge  Please have patient follow up with his Primary Care Doctor, Dr Tafoya, within 2 weeks of discharge  Please have patient follow up with his Neurologist, Dr Moreno, within 2 weeks of discharge

## 2017-06-26 NOTE — DISCHARGE NOTE ADULT - PATIENT PORTAL LINK FT
“You can access the [employer] Patient Portal, offered by [facility], by registering with the following website: [email or web address]”

## 2017-06-26 NOTE — DISCHARGE NOTE ADULT - MEDICATION SUMMARY - MEDICATIONS TO CHANGE
I will SWITCH the dose or number of times a day I take the medications listed below when I get home from the hospital:    potassium citrate 15 mEq oral tablet, extended release  -- 1 tab(s) by mouth 2 times a day

## 2017-06-26 NOTE — DISCHARGE NOTE ADULT - HOSPITAL COURSE
***To be completed by Attending Physician*** 83 y old male with a history of mild dementia on Aricept hx of hyperlipidemia and kidney stones sent from PMD, Dr. Tafoya for bradycardia.   82 y/o M with PMH of mild Alzheimer's Dementia, BPH, GERD, HLD, kidney stones, sent in from PMD for bradycardia. He went to PMD for checkup and not for any specific compliant or reason. Patient himself denies any chest pain, sob, palpitations, lightheadness, syncope, loc, palpitations, n/v today or on previous days. His wife reports patient has had increasing number of falls in past few months but he denies any pain in hips, legs, head, chest now. Occasionally patient tells her he is lightheaded and he does not respond to her and seems to "zone out". No f/c/cough/dysuria. At baseline ambulates with RW.     Dx:  Bradycardia-->Hr 40's    6/22	trop x 2 negative   	brief episode of bradycardia to 39- R2 pads placed on patient   6/23 -Bradycardia down into the 30's during the night, R2 pads on. now 40 to 70. TSH wnl,                 pending echo,                carotid doppler, BLE doppler, Aricept which may cause daniel was stopped.                 cardio Dr Hastings evaluating, EP consult pending  6/24 Night: S/P unwittnessed fall bent down to get piece of paper, asymptomatic, physical exam normal - family updated, dr tafoya paged x 1   	- portable hip xray - prelim no acute/emergent findings   06/25 : LE dopplers negative            : s/p fall overnight ,             : Xary pelvis no FX   6/26:	Pt pending Echo.  Neurology consult called secondary to patient with increase  	in number of falls.  Speak with   6/27:	EP called to consult re: Bradycardia/Sick Sinus Syndrome.   	1745:   NPO after MN for Tilt table test tomorrow.   6/28:	s/p Tilt Test.  Loop recorder planted.    6/29:	Pt's HR fluctuating from 50's to 140's bpm.  Will discuss with Cardiology re:  	management.    	1800:  Discharge planning for tomorrow.   spoke with wife.  Wife  	does not want home care services.

## 2017-06-26 NOTE — DISCHARGE NOTE ADULT - SECONDARY DIAGNOSIS.
Fasciculations Dehydration GERD (gastroesophageal reflux disease) Alzheimer's disease High cholesterol Sinus tachycardia Alzheimer's dementia with behavioral disturbance

## 2017-06-26 NOTE — DISCHARGE NOTE ADULT - MEDICATION SUMMARY - MEDICATIONS TO STOP TAKING
I will STOP taking the medications listed below when I get home from the hospital:    Aricept 10 mg oral tablet  -- 1 tab(s) by mouth once a day (at bedtime)    ibuprofen 200 mg oral capsule  --  by mouth 2 times a day

## 2017-06-26 NOTE — DISCHARGE NOTE ADULT - MEDICATION SUMMARY - MEDICATIONS TO TAKE
I will START or STAY ON the medications listed below when I get home from the hospital:    outpatient physical therapy   -- Indication: For physical therapy     Zetia  -- 10 milligram(s) by mouth once a day  -- Indication: For Hyperlipidemia    rosuvastatin 20 mg oral tablet  -- 1 tab(s) by mouth once a week on sunday  -- Indication: For Hyperlipidemia    QUEtiapine 25 mg oral tablet  -- 1 cap(s) by mouth once a day  -- Indication: For Alzheimer's dementia with behavioral disturbance    ranitidine  -- 150 milligram(s) by mouth 2 times a day  -- Indication: For GERD (gastroesophageal reflux disease)    senna oral tablet  -- 2 tab(s) by mouth once a day (at bedtime)  -- Indication: For Stool softener    potassium chloride 10 mEq oral tablet, extended release  -- 1 tab(s) by mouth once a day  -- Indication: For Supplement    Nuedexta 20 mg-10 mg oral capsule  -- 1 cap(s) by mouth every 12 hours  -- Indication: For Alzheimer's dementia with behavioral disturbance    ICaps with Lutein and Zeaxan oral tablet  -- 1 tab(s) by mouth once a day  -- Indication: For Supplement    Vitamin D3 2000 intl units oral capsule  -- 1 cap(s) by mouth 2 times a day  -- Indication: For Supplement

## 2017-06-26 NOTE — DISCHARGE NOTE ADULT - CARE PROVIDER_API CALL
Walter Riley (), Cardiology Medicine  [address]  [place] [address]  Phone: [phone]  Fax: [phone]    Dalton Tafoya (MD), Internal Medicine  [address]  [place] [address]  Phone: [phone]  Fax: [phone]    Elvis Moreno), Electrodiagnostic Medicine; Neurology  [address]  [place] [address]  Phone: [phone]  Fax: [phone]

## 2017-06-26 NOTE — DISCHARGE NOTE ADULT - PLAN OF CARE
optimal therapy A loop recorder was placed to detect specific  arrythmia   Tilt table test negative  Please have patient follow up with his Cardiologist for loop recorder check /Persistent fasciculations;  Please have patient follow up with Neurologist, Dr Moreno, for further workup including EMG Fluid intake encouraged  Patient to follow up with his Primary Care Doctor for continued monitoring HOME CARE INSTRUCTIONS.The care of individuals with dementia is varied and dependent upon the progression of the dementia. The following suggestions are intended for the person living with, or caring for, the person with dementia.Create a safe environment.Remove the locks on bathroom doors to prevent the person from accidentally locking himself or herself in.Use childproof latches on cabinets and any place where cleaning supplies, chemicals, or alcohol are kept.Keep the house free from clutter. Remove rugs or anything that might contribute to a fall.Use night lights or dim lights at night; Have a consistent nighttime routine; limit napping during the day.Monitor chewing and swallowing ability.Only give over-the-counter or prescription edicines as directed by the caregiver.SEEK MEDICAL CARE IF:New behavioral problems start uch as moodiness, aggressiveness, or seeing things that are not there (hallucinations).Any new problem with rain function such as balance, speech, or falling a lot.Problems with swallowing develop.SEEK IMMEDIATE MEDICAL CARE IF:A fever develops.New or worsened confusion and/or sleepiness develops. HOME CARE INSTRUCTIONS. The care of individuals with dementia is varied and dependent upon the progression of the dementia. The following suggestions are intended for the person living with, or caring for, the person with dementia.Create a safe environment.Remove the locks on bathroom doors to prevent the person from accidentally locking himself or herself in.Use childproof latches on cabinets and any place where cleaning supplies, chemicals, or alcohol are kept.Keep the house free from clutter. Remove rugs or anything that might contribute to a fall. Use night lights or dim lights at night; Have a consistent nighttime routine; limit napping during the day. Monitor chewing and swallowing ability. Only give over-the-counter or prescription medicines as directed by the caregiver.SEEK MEDICAL CARE IF:New behavioral problems start such as moodiness, aggressiveness, or seeing things that are not there (hallucinations).Any new problem with rain function such as balance, speech, or falling a lot .Problems with swallowing develop. SEEK IMMEDIATE MEDICAL CARE IF: A fever develops. New or worsened confusion and/or sleepiness develops. A loop recorder was placed to further monitor the patient's  heart rhythm   Please have patient follow up with his Cardiologist, Dr Riley, for further monitoring and care  Please have patient follow up with his Primary Care Doctor, Dr Tafoya, within 2 weeks of discharge Continue ranitidine  Follow up with your Primary Care Doctor as recommended Continue medications as prescribed  Patient to follow up with Dr Tafoya as recommended Asymptomatic;   A loop recorder was placed to further monitor the patient's  heart rhythm ; wife instructed on device by EP representative  Please have patient follow up with his Cardiologist, Dr Riley, for further monitoring and care  Please have patient follow up with his Primary Care Doctor, Dr Tafoya, within 2 weeks of discharge asymptomatic Sinus tachycardia episode;  loop recorder in place  Oral fluid intake encouraged  Patient to follow up with her PMD and Cardiologist as recommended

## 2017-06-26 NOTE — DISCHARGE NOTE ADULT - CARE PLAN
Principal Discharge DX:	Sick sinus syndrome  Goal:	optimal therapy  Instructions for follow-up, activity and diet:	A loop recorder was placed to detect specific  arrythmia   Tilt table test negative  Please have patient follow up with his Cardiologist for loop recorder check  Secondary Diagnosis:	Fasciculations  Instructions for follow-up, activity and diet:	/Persistent fasciculations;  Please have patient follow up with Neurologist, Dr Moreno, for further workup including EMG  Secondary Diagnosis:	Dehydration  Instructions for follow-up, activity and diet:	Fluid intake encouraged  Patient to follow up with his Primary Care Doctor for continued monitoring  Secondary Diagnosis:	GERD (gastroesophageal reflux disease)  Secondary Diagnosis:	Alzheimer's disease  Instructions for follow-up, activity and diet:	HOME CARE INSTRUCTIONS.The care of individuals with dementia is varied and dependent upon the progression of the dementia. The following suggestions are intended for the person living with, or caring for, the person with dementia.Create a safe environment.Remove the locks on bathroom doors to prevent the person from accidentally locking himself or herself in.Use childproof latches on cabinets and any place where cleaning supplies, chemicals, or alcohol are kept.Keep the house free from clutter. Remove rugs or anything that might contribute to a fall.Use night lights or dim lights at night; Have a consistent nighttime routine; limit napping during the day.Monitor chewing and swallowing ability.Only give over-the-counter or prescription edicines as directed by the caregiver.SEEK MEDICAL CARE IF:New behavioral problems start uch as moodiness, aggressiveness, or seeing things that are not there (hallucinations).Any new problem with rain function such as balance, speech, or falling a lot.Problems with swallowing develop.SEEK IMMEDIATE MEDICAL CARE IF:A fever develops.New or worsened confusion and/or sleepiness develops. Principal Discharge DX:	Sick sinus syndrome  Goal:	optimal therapy  Instructions for follow-up, activity and diet:	A loop recorder was placed to further monitor the patient's  heart rhythm   Please have patient follow up with his Cardiologist, Dr Riley, for further monitoring and care  Please have patient follow up with his Primary Care Doctor, Dr Tafoya, within 2 weeks of discharge  Secondary Diagnosis:	Fasciculations  Instructions for follow-up, activity and diet:	/Persistent fasciculations;  Please have patient follow up with Neurologist, Dr Moreno, for further workup including EMG  Secondary Diagnosis:	GERD (gastroesophageal reflux disease)  Instructions for follow-up, activity and diet:	Continue ranitidine  Follow up with your Primary Care Doctor as recommended  Secondary Diagnosis:	Alzheimer's disease  Instructions for follow-up, activity and diet:	HOME CARE INSTRUCTIONS. The care of individuals with dementia is varied and dependent upon the progression of the dementia. The following suggestions are intended for the person living with, or caring for, the person with dementia.Create a safe environment.Remove the locks on bathroom doors to prevent the person from accidentally locking himself or herself in.Use childproof latches on cabinets and any place where cleaning supplies, chemicals, or alcohol are kept.Keep the house free from clutter. Remove rugs or anything that might contribute to a fall. Use night lights or dim lights at night; Have a consistent nighttime routine; limit napping during the day. Monitor chewing and swallowing ability. Only give over-the-counter or prescription medicines as directed by the caregiver.SEEK MEDICAL CARE IF:New behavioral problems start such as moodiness, aggressiveness, or seeing things that are not there (hallucinations).Any new problem with rain function such as balance, speech, or falling a lot .Problems with swallowing develop. SEEK IMMEDIATE MEDICAL CARE IF: A fever develops. New or worsened confusion and/or sleepiness develops. Principal Discharge DX:	Sick sinus syndrome  Goal:	optimal therapy  Instructions for follow-up, activity and diet:	Asymptomatic;   A loop recorder was placed to further monitor the patient's  heart rhythm ; wife instructed on device by EP representative  Please have patient follow up with his Cardiologist, Dr Riley, for further monitoring and care  Please have patient follow up with his Primary Care Doctor, Dr Tafoya, within 2 weeks of discharge  Secondary Diagnosis:	Fasciculations  Instructions for follow-up, activity and diet:	/Persistent fasciculations;  Please have patient follow up with Neurologist, Dr Moreno, for further workup including EMG  Secondary Diagnosis:	Sinus tachycardia  Instructions for follow-up, activity and diet:	asymptomatic Sinus tachycardia episode;  loop recorder in place  Oral fluid intake encouraged  Patient to follow up with her PMD and Cardiologist as recommended  Secondary Diagnosis:	Alzheimer's dementia with behavioral disturbance  Instructions for follow-up, activity and diet:	HOME CARE INSTRUCTIONS. The care of individuals with dementia is varied and dependent upon the progression of the dementia. The following suggestions are intended for the person living with, or caring for, the person with dementia.Create a safe environment.Remove the locks on bathroom doors to prevent the person from accidentally locking himself or herself in.Use childproof latches on cabinets and any place where cleaning supplies, chemicals, or alcohol are kept.Keep the house free from clutter. Remove rugs or anything that might contribute to a fall. Use night lights or dim lights at night; Have a consistent nighttime routine; limit napping during the day. Monitor chewing and swallowing ability. Only give over-the-counter or prescription medicines as directed by the caregiver.SEEK MEDICAL CARE IF:New behavioral problems start such as moodiness, aggressiveness, or seeing things that are not there (hallucinations).Any new problem with rain function such as balance, speech, or falling a lot .Problems with swallowing develop. SEEK IMMEDIATE MEDICAL CARE IF: A fever develops. New or worsened confusion and/or sleepiness develops.  Secondary Diagnosis:	GERD (gastroesophageal reflux disease)  Instructions for follow-up, activity and diet:	Continue ranitidine  Follow up with your Primary Care Doctor as recommended  Secondary Diagnosis:	High cholesterol  Instructions for follow-up, activity and diet:	Continue medications as prescribed  Patient to follow up with Dr Tafoya as recommended

## 2017-06-27 LAB
ANION GAP SERPL CALC-SCNC: 15 MMOL/L — SIGNIFICANT CHANGE UP (ref 5–17)
BUN SERPL-MCNC: 23 MG/DL — SIGNIFICANT CHANGE UP (ref 7–23)
CALCIUM SERPL-MCNC: 9.3 MG/DL — SIGNIFICANT CHANGE UP (ref 8.4–10.5)
CHLORIDE SERPL-SCNC: 106 MMOL/L — SIGNIFICANT CHANGE UP (ref 96–108)
CO2 SERPL-SCNC: 20 MMOL/L — LOW (ref 22–31)
CREAT SERPL-MCNC: 1.25 MG/DL — SIGNIFICANT CHANGE UP (ref 0.5–1.3)
GLUCOSE SERPL-MCNC: 87 MG/DL — SIGNIFICANT CHANGE UP (ref 70–99)
POTASSIUM SERPL-MCNC: 4.4 MMOL/L — SIGNIFICANT CHANGE UP (ref 3.5–5.3)
POTASSIUM SERPL-SCNC: 4.4 MMOL/L — SIGNIFICANT CHANGE UP (ref 3.5–5.3)
SODIUM SERPL-SCNC: 141 MMOL/L — SIGNIFICANT CHANGE UP (ref 135–145)

## 2017-06-27 PROCEDURE — 93306 TTE W/DOPPLER COMPLETE: CPT | Mod: 26

## 2017-06-27 RX ADMIN — FAMOTIDINE 20 MILLIGRAM(S): 10 INJECTION INTRAVENOUS at 06:05

## 2017-06-27 RX ADMIN — QUETIAPINE FUMARATE 25 MILLIGRAM(S): 200 TABLET, FILM COATED ORAL at 21:42

## 2017-06-27 RX ADMIN — Medication 4000 UNIT(S): at 13:25

## 2017-06-27 RX ADMIN — HEPARIN SODIUM 5000 UNIT(S): 5000 INJECTION INTRAVENOUS; SUBCUTANEOUS at 06:05

## 2017-06-27 RX ADMIN — Medication 10 MILLIEQUIVALENT(S): at 13:25

## 2017-06-27 RX ADMIN — HEPARIN SODIUM 5000 UNIT(S): 5000 INJECTION INTRAVENOUS; SUBCUTANEOUS at 17:53

## 2017-06-27 RX ADMIN — ATORVASTATIN CALCIUM 40 MILLIGRAM(S): 80 TABLET, FILM COATED ORAL at 21:42

## 2017-06-27 RX ADMIN — FAMOTIDINE 20 MILLIGRAM(S): 10 INJECTION INTRAVENOUS at 17:53

## 2017-06-27 NOTE — CONSULT NOTE ADULT - SUBJECTIVE AND OBJECTIVE BOX
[facility]  [facility]     CONSULTATION NOTE   ________________________________________________      CHIEF COMPLAINT:Patient is a 83y old  Male who presents with a chief complaint of sent in for bradycardia from pmd (26 Jun 2017 13:27) s/p syncope      HPI:  84 y/o M with PMH of mild Alzheimer's Dementia, BPH, GERD, HLD, kidney stones, sent in from PMD for bradycardia. He went to PMD for checkup and not for any specific compliant or reason. while on tele with hr of 40/60 at rest.  py and wife claims the episodes of fall and syncope is always afterr taking few steps.  pt drinks 6 to 8 cups of coffee per day and lasix 40 mg daily.  ED vitals:    HR 97.9, HR 51,  /75, 16, 96% on RA  HE was not given anything in ER. (22 Jun 2017 18:33)      PAST MEDICAL & SURGICAL HISTORY:  Alzheimer disease: with psuedobulbular affect  BPH (benign prostatic hypertrophy)  Renal calculi  Hyperlipidemia  GERD (gastroesophageal reflux disease)  Nephrolithiasis  H/O cataract extraction, right  S/P tonsillectomy      MEDICATIONS  (STANDING):  atorvastatin 40 milliGRAM(s) Oral at bedtime  QUEtiapine 25 milliGRAM(s) Oral at bedtime  famotidine    Tablet 20 milliGRAM(s) Oral two times a day  potassium chloride    Tablet ER 10 milliEquivalent(s) Oral daily  cholecalciferol 4000 Unit(s) Oral daily  heparin  Injectable 5000 Unit(s) SubCutaneous every 12 hours    MEDICATIONS  (PRN):      FAMILY HISTORY:  No pertinent family history in first degree relatives      SOCIAL HISTORY:    [ ] Non-smoker  [ ] Smoker  [ ] Alcohol    Allergies    IV dye (Rash)  penicillin (Rash)    Intolerances    	    REVIEW OF SYSTEMS:  CONSTITUTIONAL: No fever, weight loss, or fatigue  EYES: No eye pain, visual disturbances, or discharge  ENT:  No difficulty hearing, tinnitus, vertigo; No sinus or throat pain  NECK: No pain or stiffness  RESPIRATORY: No cough, wheezing, chills or hemoptysis; No Shortness of Breath  CARDIOVASCULAR: No chest pain, palpitations, passing out, dizziness, or leg swelling  GASTROINTESTINAL: No abdominal or epigastric pain. No nausea, vomiting, or hematemesis; No diarrhea or constipation. No melena or hematochezia.  GENITOURINARY: No dysuria, frequency, hematuria, or incontinence  NEUROLOGICAL: No headaches, memory loss, loss of strength, numbness, or tremors,+ syncoe/fall  SKIN: No itching, burning, rashes, or lesions   LYMPH Nodes: No enlarged glands  ENDOCRINE: No heat or cold intolerance; No hair loss  MUSCULOSKELETAL: No joint pain or swelling; No muscle, back, or extremity pain  PSYCHIATRIC: No depression, anxiety, mood swings, or difficulty sleeping  HEME/LYMPH: No easy bruising, or bleeding gums  ALLERGY AND IMMUNOLOGIC: No hives or eczema	    [ ] All others negative	  [ ] Unable to obtain    PHYSICAL EXAM:  T(C): 36.7 (06-27-17 @ 13:06), Max: 36.8 (06-26-17 @ 20:33)  HR: 83 (06-27-17 @ 13:06) (50 - 83)  BP: 110/70 (06-27-17 @ 13:06) (107/64 - 110/70)  RR: 18 (06-27-17 @ 13:06) (18 - 18)  SpO2: 96% (06-27-17 @ 13:06) (95% - 96%)  Wt(kg): --  I&O's Summary    26 Jun 2017 07:01  -  27 Jun 2017 07:00  --------------------------------------------------------  IN: 1180 mL / OUT: 0 mL / NET: 1180 mL    27 Jun 2017 07:01  -  27 Jun 2017 17:41  --------------------------------------------------------  IN: 840 mL / OUT: 0 mL / NET: 840 mL        Appearance: Normal	  HEENT:   Normal oral mucosa, PERRL, EOMI	  Lymphatic: No lymphadenopathy  Cardiovascular: Normal S1 S2, No JVD, No murmurs, No edema  Respiratory: Lungs clear to auscultation	  Psychiatry: A & O x 3, Mood & affect appropriate  Gastrointestinal:  Soft, Non-tender, + BS	  Skin: No rashes, No ecchymoses, No cyanosis	  Neurologic: Non-focal  Extremities: Normal range of motion, No clubbing, cyanosis or edema  Vascular: Peripheral pulses palpable 2+ bilaterally    TELEMETRY:nsr 60 	    ECG:  	  RADIOLOGY:< from: 12 Lead ECG (06.23.17 @ 00:10) >  Diagnosis Line MARKED SINUS BRADYCARDIA WITH 1ST DEGREE A-V BLOCK     OTHER: 	  	  LABS:	 	    CARDIAC MARKERS:                              15.9   8.5   )-----------( 168      ( 26 Jun 2017 06:47 )             47.5     06-27    141  |  106  |  23  ----------------------------<  87  4.4   |  20<L>  |  1.25    Ca    9.3      27 Jun 2017 07:04  Phos  3.5     06-26  Mg     2.0     06-26      proBNP:   Lipid Profile:   HgA1c:   TSH:     PREVIOUS DIAGNOSTIC TESTING:    [ ] Echocardiogram:  [ ]  Catheterization:  [ ] Stress Test:        < from: Transthoracic Echocardiogram (06.27.17 @ 17:12) >  1. Mitral annular calcification, otherwise normal mitral  valve. Minimal mitral regurgitation.  2. Aortic valve not well visualized; appears calcified.  Minimal aortic regurgitation.  3. Endocardium not well visualized; left ventricle appears  hyperdynamic. EF approximately 75-80% by visualestimation.  Patient was unable to consent for use of intravenous echo  contrast.  4. The right ventricle is not well visualized; grossly  normal right ventricular size and systolic function.    < end of copied text >
Admitting Diagnosis:  Bradycardia (R00.1): BRADYCARDIA, UNSPECIFIED      This is an 83 year old Male with Alzheimer's Dementia, BPH, GERD, HLD, kidney stones, and fasciculations (noted on previous admission at [facility] in September 2016. Work up included MRI Cspine: no spinal stenosis, paraneoplastic panel: negative) who was sent in by his PMD for bradycardia. Per the HPI, patient has complained to his wife about feeling light headed. Neurology consulted for frequent falls and also fasciculations. He has chronic balance difficulties that could be contributing. He denies any asymmetric weakness or numbness. He does not feel light headed now. He denies having any headaches.    Hospital course complicated by a fall on 6/25 in the early morning. Patient was attempting to get out of bed, independently and fell. Fall was unwitnessed; found on the ground. No reported head trauma.      Past Medical History:  Alzheimer disease (G30.9): with psuedobulbular affect  BPH (benign prostatic hypertrophy) (600.00)  Renal calculi (592.0)  Urinary tract infection (599.0): currently on cipro  Hyperlipidemia (272.4)  GERD (gastroesophageal reflux disease) (530.81)      Past Surgical History:  Nephrolithiasis (N20.0)  H/O cataract extraction, right (Z98.89)  S/P tonsillectomy (V45.89)      Social History:  No toxic habits    Family History:  FAMILY HISTORY:  No pertinent family history in first degree relatives    Allergies:  IV dye (Rash)  penicillin (Rash)      ROS:  Constitutional: Patient offers no complaints of fevers or significant weight loss  Ears, Nose, Mouth and Throat: The patient presents with no abnormalities of the head, ears, eyes, nose or throat  Skin: Patient offers no concerns of new rashes or lesions  Respiratory: The patient presents with no abnormalities of the respiratory tract  Cardiovascular: The patient presents with no cardiac abnormalities  Gastrointestinal: The patient presents with no abnormalities of the GI system  Genitourinary: The patient presents with no dysuria, hematuria or frequent urination  Neurological: See HPI  Endocrine: Patient offers no complaints of excessive thirst, urination, or heat/cold intolerance    Advanced care planning reviewed and noted in the chart.    Medications:  atorvastatin 40 milliGRAM(s) Oral at bedtime  QUEtiapine 25 milliGRAM(s) Oral at bedtime  famotidine    Tablet 20 milliGRAM(s) Oral two times a day  potassium chloride    Tablet ER 10 milliEquivalent(s) Oral daily  cholecalciferol 4000 Unit(s) Oral daily  heparin  Injectable 5000 Unit(s) SubCutaneous every 12 hours      Vitals:  Vital Signs Last 24 Hrs  T(C): 36.6 (27 Jun 2017 04:53), Max: 37.1 (26 Jun 2017 13:54)  T(F): 97.8 (27 Jun 2017 04:53), Max: 98.8 (26 Jun 2017 13:54)  HR: 50 (27 Jun 2017 04:53) (50 - 95)  BP: 107/70 (27 Jun 2017 04:53) (107/64 - 110/70)  BP(mean): --  RR: 18 (27 Jun 2017 04:53) (17 - 18)  SpO2: 96% (27 Jun 2017 04:53) (95% - 96%)    NEUROLOGICAL EXAM:    Mental status: Awake, alert, and in no apparent distress. Oriented to person, knows it is a hospital, but thinks it is [facility]. Oriented to June 2017. Thinks it is Thursday. Language function is normal: fluent and repetition intact. Follows commands. No neglect.     Cranial Nerves: Extraocular movements were intact. Visual field were full to blink to threat bilaterally. Fundoscopic exam was deferred. Facial sensation was intact to light touch. There was no facial asymmetry. The palate was upgoing symmetrically and tongue was midline. Hearing acuity was intact to finger rub AU. Shoulder shrug was full bilaterally    Motor exam: Bulk and tone were normal. Strength was 5/5 in all four extremities, including the intrinsic muscles of the hands. No atrophy noted. Fine finger movements were symmetric and normal. There was no pronator drift. No postural or action tremor. Noted to have spontaneous fasciculations in the LE.     Reflexes: Absent throughout. Toes were downgoing on the left but upgoing on the right.     Sensation: Intact to light touch x 4 extremities. Decreased vibration in the LE up to level of the knees.    Coordination: Finger-nose-finger was without dysmetria. No myoclonus.      Gait: deferred      Labs:  CBC Full  -  ( 26 Jun 2017 06:47 )  WBC Count : 8.5 K/uL  Hemoglobin : 15.9 g/dL  Hematocrit : 47.5 %  Platelet Count - Automated : 168 K/uL  Mean Cell Volume : 104.0 fl  Mean Cell Hemoglobin : 34.8 pg  Mean Cell Hemoglobin Concentration : 33.5 gm/dL      06-27    141  |  106  |  23  ----------------------------<  87  4.4   |  20<L>  |  1.25    Ca    9.3      27 Jun 2017 07:04  Phos  3.5     06-26  Mg     2.0     06-26      TSH: 1.68  B12 (9/2016) 513  Folate (9/2016) 18.7  Paraneoplastic panel (9/2016): negative
Chief Complaint: Bradycardia    HPI: 83 year old man with PMH as stated presents with bradycardia. Patient currently denies symptoms but does admit to increased falls of late and lack of balance. As per chart his wife endorses that he does feel lightheaded at times. She also notes periods where the patient appears to zone out. No syncope. No chest pain. No shortness of breath.     PMH:   Alzheimer disease  BPH (benign prostatic hypertrophy)  Renal calculi  Urinary tract infection  Hyperlipidemia  GERD (gastroesophageal reflux disease)    PSH:   Nephrolithiasis  H/O cataract extraction, right  S/P tonsillectomy    Family History:  FAMILY HISTORY:  No pertinent family history in first degree relatives    Allergies:  IV dye (Rash)  penicillin (Rash)    Social History:  Smoking: Denied  Alcohol: Rare  Drugs:    Medications:  atorvastatin 40milliGRAM(s) Oral at bedtime  QUEtiapine 25milliGRAM(s) Oral at bedtime  famotidine    Tablet 20milliGRAM(s) Oral two times a day  potassium chloride    Tablet ER 10milliEquivalent(s) Oral daily  cholecalciferol 4000Unit(s) Oral daily  heparin  Injectable 5000Unit(s) SubCutaneous every 12 hours      Cardiovascular Diagnostic Testing:  ECG: Sinus daniel, no acute ST-T changes    Echo:     Stress Testing:    Cath:    Imaging:    Labs:                        14.7   6.8   )-----------( 156      ( 23 Jun 2017 06:59 )             43.6     06-23    141  |  104  |  23  ----------------------------<  84  4.2   |  23  |  1.22    Ca    9.4      23 Jun 2017 06:59  Phos  3.6     06-23  Mg     1.9     06-23    TPro  7.2  /  Alb  4.1  /  TBili  0.5  /  DBili  x   /  AST  29  /  ALT  46<H>  /  AlkPhos  65  06-22    PT/INR - ( 22 Jun 2017 16:43 )   PT: 11.7 sec;   INR: 1.07 ratio         PTT - ( 22 Jun 2017 16:43 )  PTT:34.7 sec  CARDIAC MARKERS ( 22 Jun 2017 22:42 )  x     / <0.01 ng/mL / x     / x     / x      CARDIAC MARKERS ( 22 Jun 2017 16:43 )  x     / <0.01 ng/mL / x     / x     / x          Serum Pro-Brain Natriuretic Peptide: 66 pg/mL (06-22 @ 16:43)    Thyroid Stimulating Hormone, Serum: 1.68 uIU/mL (06-23 @ 06:56)      Physical Exam:  T(C): 36.7, Max: 36.9 (06-22 @ 20:56)  HR: 59 (43 - 59)  BP: 111/72 (107/68 - 138/69)  RR: 18 (13 - 18)  SpO2: 94% (93% - 98%)  Wt(kg): --  I & Os for 24h ending 06-23 @ 07:00  =============================================  IN: 0 ml / OUT: 250 ml / NET: -250 ml    I & Os for current day (as of 06-23 @ 14:50)  =============================================  IN: 740 ml / OUT: 0 ml / NET: 740 ml    Daily     Daily

## 2017-06-27 NOTE — CONSULT NOTE ADULT - ASSESSMENT
Mr. Taylor is an 83 year old Male with Alzheimer's Dementia, BPH, GERD, HLD, kidney stones, and fasciculations (noted on previous admission at [facility] in September 2016. Work up included MRI Cspine: no spinal stenosis, paraneoplastic panel: negative) who was sent in by his PMD for bradycardia. Neurology consulted for persistent fasciculations, since last year. No change in patient's exam, in regards to loss of strength, increased tone, or muscle atrophy but fasciculations are still present. ? motor neuron disease.    Plan:   1. Will obtain a EMG/NCS as an outpatient in our office   2. Will also repeat the paraneoplastic panel   3. PT/OT   4. Fall prevention precautions   5. No further inpatient work up at this time   6. Avoid delirium causing agents   - should continue Aricept, Nudexta 20-10mg 1 tab BID, and seroquel   7. Supportive care   d/c planning per primary team
pt hx sound like neurocardiogenic /orthostatic hypotension in view of hyperdynamic lv and combination of diuretic as out pt.  recommend:  check orthostatic bp,check and tsh level.  tilt possible loop  discussed with pt and wife.  thank you.
83 year old male SSS likely symptomatic.

## 2017-06-28 LAB
ANION GAP SERPL CALC-SCNC: 13 MMOL/L — SIGNIFICANT CHANGE UP (ref 5–17)
BUN SERPL-MCNC: 21 MG/DL — SIGNIFICANT CHANGE UP (ref 7–23)
CALCIUM SERPL-MCNC: 9.7 MG/DL — SIGNIFICANT CHANGE UP (ref 8.4–10.5)
CHLORIDE SERPL-SCNC: 105 MMOL/L — SIGNIFICANT CHANGE UP (ref 96–108)
CO2 SERPL-SCNC: 23 MMOL/L — SIGNIFICANT CHANGE UP (ref 22–31)
CREAT SERPL-MCNC: 1.27 MG/DL — SIGNIFICANT CHANGE UP (ref 0.5–1.3)
GLUCOSE SERPL-MCNC: 93 MG/DL — SIGNIFICANT CHANGE UP (ref 70–99)
HCT VFR BLD CALC: 47.6 % — SIGNIFICANT CHANGE UP (ref 39–50)
HGB BLD-MCNC: 16.6 G/DL — SIGNIFICANT CHANGE UP (ref 13–17)
MCHC RBC-ENTMCNC: 35 GM/DL — SIGNIFICANT CHANGE UP (ref 32–36)
MCHC RBC-ENTMCNC: 35.9 PG — HIGH (ref 27–34)
MCV RBC AUTO: 103 FL — HIGH (ref 80–100)
PLATELET # BLD AUTO: 150 K/UL — SIGNIFICANT CHANGE UP (ref 150–400)
POTASSIUM SERPL-MCNC: 4.4 MMOL/L — SIGNIFICANT CHANGE UP (ref 3.5–5.3)
POTASSIUM SERPL-SCNC: 4.4 MMOL/L — SIGNIFICANT CHANGE UP (ref 3.5–5.3)
RBC # BLD: 4.64 M/UL — SIGNIFICANT CHANGE UP (ref 4.2–5.8)
RBC # FLD: 11.9 % — SIGNIFICANT CHANGE UP (ref 10.3–14.5)
SODIUM SERPL-SCNC: 141 MMOL/L — SIGNIFICANT CHANGE UP (ref 135–145)
WBC # BLD: 8 K/UL — SIGNIFICANT CHANGE UP (ref 3.8–10.5)
WBC # FLD AUTO: 8 K/UL — SIGNIFICANT CHANGE UP (ref 3.8–10.5)

## 2017-06-28 PROCEDURE — 99232 SBSQ HOSP IP/OBS MODERATE 35: CPT

## 2017-06-28 RX ADMIN — FAMOTIDINE 20 MILLIGRAM(S): 10 INJECTION INTRAVENOUS at 17:37

## 2017-06-28 RX ADMIN — HEPARIN SODIUM 5000 UNIT(S): 5000 INJECTION INTRAVENOUS; SUBCUTANEOUS at 05:02

## 2017-06-28 RX ADMIN — HEPARIN SODIUM 5000 UNIT(S): 5000 INJECTION INTRAVENOUS; SUBCUTANEOUS at 17:37

## 2017-06-28 RX ADMIN — ATORVASTATIN CALCIUM 40 MILLIGRAM(S): 80 TABLET, FILM COATED ORAL at 21:23

## 2017-06-28 RX ADMIN — QUETIAPINE FUMARATE 25 MILLIGRAM(S): 200 TABLET, FILM COATED ORAL at 21:22

## 2017-06-29 PROCEDURE — 99232 SBSQ HOSP IP/OBS MODERATE 35: CPT

## 2017-06-29 RX ORDER — DOCUSATE SODIUM 100 MG
100 CAPSULE ORAL
Qty: 0 | Refills: 0 | Status: DISCONTINUED | OUTPATIENT
Start: 2017-06-29 | End: 2017-06-30

## 2017-06-29 RX ORDER — SENNA PLUS 8.6 MG/1
2 TABLET ORAL AT BEDTIME
Qty: 0 | Refills: 0 | Status: DISCONTINUED | OUTPATIENT
Start: 2017-06-29 | End: 2017-06-30

## 2017-06-29 RX ADMIN — Medication 100 MILLIGRAM(S): at 17:37

## 2017-06-29 RX ADMIN — ATORVASTATIN CALCIUM 40 MILLIGRAM(S): 80 TABLET, FILM COATED ORAL at 21:35

## 2017-06-29 RX ADMIN — SENNA PLUS 2 TABLET(S): 8.6 TABLET ORAL at 21:35

## 2017-06-29 RX ADMIN — Medication 4000 UNIT(S): at 10:31

## 2017-06-29 RX ADMIN — QUETIAPINE FUMARATE 25 MILLIGRAM(S): 200 TABLET, FILM COATED ORAL at 21:35

## 2017-06-29 RX ADMIN — HEPARIN SODIUM 5000 UNIT(S): 5000 INJECTION INTRAVENOUS; SUBCUTANEOUS at 17:36

## 2017-06-29 RX ADMIN — HEPARIN SODIUM 5000 UNIT(S): 5000 INJECTION INTRAVENOUS; SUBCUTANEOUS at 05:27

## 2017-06-29 RX ADMIN — Medication 10 MILLIEQUIVALENT(S): at 10:31

## 2017-06-29 RX ADMIN — FAMOTIDINE 20 MILLIGRAM(S): 10 INJECTION INTRAVENOUS at 05:27

## 2017-06-29 RX ADMIN — FAMOTIDINE 20 MILLIGRAM(S): 10 INJECTION INTRAVENOUS at 17:36

## 2017-06-29 NOTE — CHART NOTE - NSCHARTNOTEFT_GEN_A_CORE
Patient is a 83y old  Male who presents with a chief complaint of sent in for bradycardia from pmd (26 Jun 2017 13:27)  Notified by Telemetry Tech and RN pt has been experiencing episodes of sinus tachycardia HR: 130-150's bpm.  HR is noticed when pt is moving or when he is in the bathroom.  HR is decreased to 50-70's when pt is at rest.          Physical Exam:  General: WN/WD NAD  Neurology: A&Ox3, nonfocal, FOSTER x 4  Head:  Normocephalic, atraumatic  Respiratory: CTA B/L  CV: RRR, S1S2, no murmur        HPI:  82 y/o M with PMH of mild Alzheimer's Dementia, BPH, GERD, HLD, kidney stones, sent in from PMD for bradycardia. Pt is s/p Loop Recorder on 6/28/17.  No complaints of shortness of breath nor palpitation. Pt is presently asymptomatic.      Assessment:  Tachybrady Syndrome                        s/p Tilt Table Test (Negative results)                        s/p Loop Recorder    Plan:  Consulted with Cardiology and discussed findings with Attending.   >Continue present treatment plan.   >Maintain hydration   >    Rina Quinn ANP-BC  #[id number]

## 2017-06-29 NOTE — CHART NOTE - NSCHARTNOTEFT_GEN_A_CORE
Patient is a 83y old  Male who presents with a chief complaint of sent in for bradycardia from pmd (26 Jun 2017 13:27).  Pt s/p Loop recorder yesterday.  Alerted by Telemetry Tech, pt's 's.  Pt was in the bathroom s/p bowel movement.  No complaints of   feeling lightheaded nor dizzy.        Vital Signs Last 24 Hrs  T(C): 36.6 (29 Jun 2017 05:00), Max: 36.8 (28 Jun 2017 20:40)  T(F): 97.9 (29 Jun 2017 05:00), Max: 98.3 (28 Jun 2017 20:40)  HR: 69 (29 Jun 2017 05:00) (51 - 81)  BP: 127/89 (29 Jun 2017 05:00) (107/73 - 127/89)  BP(mean): --  RR: 18 (29 Jun 2017 05:00) (18 - 18)  SpO2: 95% (29 Jun 2017 05:00) (94% - 99%)    Physical Exam:  General: WN/WD NAF  Neurology: A&Ox3, nonfocal, FOSTER x 4  Head:  Normocephalic, atraumatic  Respiratory: CTA B/L  CV: RRR, S1S2, no murmur  Abdominal: Soft, NT, ND no palpable mass  MSK: No edema, + peripheral pulses, FROM all 4 extremity  Labs:                          16.6   8.0   )-----------( 150      ( 28 Jun 2017 05:51 )             47.6     06-28    141  |  105  |  21  ----------------------------<  93  4.4   |  23  |  1.27    Ca    9.7      28 Jun 2017 05:51              Radiology:    HPI:  84 y/o M with PMH of mild Alzheimer's Dementia, BPH, GERD, HLD, kidney stones, sent in from PMD for bradycardia. He went to PMD for checkup and not for any specific compliant or reason. Patient himself denies any chest pain, sob, palpitations, lightheadness, syncope, loc, palpitations, n/v today or on previous days. His wife reports patient has had increasing number of falls in past few months but he denies any pain in hips, legs, head, chest now. Occasionally patient tells her he is lightheaded and he does not respond to her and seems to "zone out". No f/c/cough/dysuria. At baseline ambulates with RW.     ED vitals:    HR 97.9, HR 51,  /75, 16, 96% on RA  HE was not given anything in ER. (22 Jun 2017 18:33)    Assessment & Plan:  >  >  >  >    Rina Quinn [facility]-BC  #[id number]

## 2017-06-30 VITALS
DIASTOLIC BLOOD PRESSURE: 73 MMHG | OXYGEN SATURATION: 93 % | SYSTOLIC BLOOD PRESSURE: 109 MMHG | HEART RATE: 78 BPM | TEMPERATURE: 98 F | RESPIRATION RATE: 18 BRPM

## 2017-06-30 PROCEDURE — 93970 EXTREMITY STUDY: CPT

## 2017-06-30 PROCEDURE — 93660 TILT TABLE EVALUATION: CPT

## 2017-06-30 PROCEDURE — 72170 X-RAY EXAM OF PELVIS: CPT

## 2017-06-30 PROCEDURE — 82435 ASSAY OF BLOOD CHLORIDE: CPT

## 2017-06-30 PROCEDURE — 71045 X-RAY EXAM CHEST 1 VIEW: CPT

## 2017-06-30 PROCEDURE — 85027 COMPLETE CBC AUTOMATED: CPT

## 2017-06-30 PROCEDURE — 93005 ELECTROCARDIOGRAM TRACING: CPT

## 2017-06-30 PROCEDURE — 85610 PROTHROMBIN TIME: CPT

## 2017-06-30 PROCEDURE — 83735 ASSAY OF MAGNESIUM: CPT

## 2017-06-30 PROCEDURE — 84436 ASSAY OF TOTAL THYROXINE: CPT

## 2017-06-30 PROCEDURE — 80053 COMPREHEN METABOLIC PANEL: CPT

## 2017-06-30 PROCEDURE — 82947 ASSAY GLUCOSE BLOOD QUANT: CPT

## 2017-06-30 PROCEDURE — 83880 ASSAY OF NATRIURETIC PEPTIDE: CPT

## 2017-06-30 PROCEDURE — 84132 ASSAY OF SERUM POTASSIUM: CPT

## 2017-06-30 PROCEDURE — 84295 ASSAY OF SERUM SODIUM: CPT

## 2017-06-30 PROCEDURE — 93880 EXTRACRANIAL BILAT STUDY: CPT

## 2017-06-30 PROCEDURE — 93306 TTE W/DOPPLER COMPLETE: CPT

## 2017-06-30 PROCEDURE — 93923 UPR/LXTR ART STDY 3+ LVLS: CPT

## 2017-06-30 PROCEDURE — 99232 SBSQ HOSP IP/OBS MODERATE 35: CPT

## 2017-06-30 PROCEDURE — 83605 ASSAY OF LACTIC ACID: CPT

## 2017-06-30 PROCEDURE — 85014 HEMATOCRIT: CPT

## 2017-06-30 PROCEDURE — 84484 ASSAY OF TROPONIN QUANT: CPT

## 2017-06-30 PROCEDURE — 82330 ASSAY OF CALCIUM: CPT

## 2017-06-30 PROCEDURE — 99285 EMERGENCY DEPT VISIT HI MDM: CPT | Mod: 25

## 2017-06-30 PROCEDURE — 82803 BLOOD GASES ANY COMBINATION: CPT

## 2017-06-30 PROCEDURE — 85730 THROMBOPLASTIN TIME PARTIAL: CPT

## 2017-06-30 PROCEDURE — 33282: CPT

## 2017-06-30 PROCEDURE — 80048 BASIC METABOLIC PNL TOTAL CA: CPT

## 2017-06-30 PROCEDURE — 84443 ASSAY THYROID STIM HORMONE: CPT

## 2017-06-30 PROCEDURE — 84100 ASSAY OF PHOSPHORUS: CPT

## 2017-06-30 PROCEDURE — 97161 PT EVAL LOW COMPLEX 20 MIN: CPT

## 2017-06-30 RX ORDER — POTASSIUM CHLORIDE 20 MEQ
1 PACKET (EA) ORAL
Qty: 14 | Refills: 0 | OUTPATIENT
Start: 2017-06-30 | End: 2017-07-14

## 2017-06-30 RX ORDER — IBUPROFEN 200 MG
0 TABLET ORAL
Qty: 0 | Refills: 0 | COMMUNITY

## 2017-06-30 RX ORDER — SENNA PLUS 8.6 MG/1
2 TABLET ORAL
Qty: 0 | Refills: 0 | COMMUNITY
Start: 2017-06-30

## 2017-06-30 RX ADMIN — HEPARIN SODIUM 5000 UNIT(S): 5000 INJECTION INTRAVENOUS; SUBCUTANEOUS at 05:39

## 2017-06-30 RX ADMIN — FAMOTIDINE 20 MILLIGRAM(S): 10 INJECTION INTRAVENOUS at 05:39

## 2017-06-30 RX ADMIN — Medication 100 MILLIGRAM(S): at 05:39

## 2017-06-30 NOTE — CHART NOTE - NSCHARTNOTEFT_GEN_A_CORE
Request from Dr Tafoya to facilitate patient's discharge home after EP provides education to wife on loop device. EP called and representative Samson in to educate wife as requested. Writer followed up with wife who verbalizes understanding of instructions given by EP. Medication reconciliation reviewed, revised and resolved with Dr Tafoya who has medically cleared patient for discharge with follow up as advised.

## 2017-06-30 NOTE — PROGRESS NOTE ADULT - PROBLEM SELECTOR PLAN 1
Patient had tilt which was unremarkable except it demonstrates he has chronotropic competency. This was also demonstrated by sinus tach to 140. This is likely due to dehydration, deconditioning and perhaps triggering the afferent limb of the bezold-jarisch reflex. In either case the management is conservative with hydration and improved conditioning. The bradycardia occurs at rest it is asymptomatic.
Patient was again tachycardic while going to bathroom. This is sinus tach and again likely reflects deconditioning and volume status. No significant daniel with symptoms. Encourage PO intake. Neither sinus tach nor sinus daniel are associated with symptoms. No further cardiac work up at this time.
Transient daniel but no significant events that correlate with symptoms.  Echo with hyperdynamic LV suggesting likely dehydration. Encourage fluid intake.   DC planning.
Transient daniel but no significant events that correlate with symptoms.  Follow up echo.   DC planning.

## 2017-06-30 NOTE — PROGRESS NOTE ADULT - ASSESSMENT
sss/sinus tachy    continue current care  loop recorder education  may d/c pt home today
83 year old male SSS likely symptomatic. No negative chronotropes  Nocturnal Bradycardia raises spectre of DOROTHY.     ADVISE:  continue TELE  EP consult  TTE
83 year old male SSS likely symptomatic. No negative chronotropes  Nocturnal Bradycardia raises spectre of DOROTHY.     ADVISE:  continue TELE  EP consult  TTE
Mr. Taylor is an 83 year old Male with Alzheimer's Dementia, BPH, GERD, HLD, kidney stones, and fasciculations (noted on previous admission at [facility] in September 2016. Work up included MRI Cspine: no spinal stenosis, paraneoplastic panel: negative) who was sent in by his PMD for bradycardia. Neurology consulted for persistent fasciculations, since last year. No change in patient's exam, fasculations in the setting of nL strength is unlikely to motor neuron disease    Plan:   1. Will obtain a EMG/NCS as an outpatient in our office   2. Will also repeat the paraneoplastic panel   3. cards work up in progress  4. PT/OT   5. Fall prevention precautions   6. No further inpatient neurology work up at this time   7. Avoid delirium causing agents   - should continue Aricept, Nudexta 20-10mg 1 tab BID, and seroquel   8. Supportive care   9. D/c planning per primary team
Mr. Taylor is an 83 year old Male with Alzheimer's Dementia, BPH, GERD, HLD, kidney stones, and fasciculations (noted on previous admission at [facility] in September 2016. Work up included MRI Cspine: no spinal stenosis, paraneoplastic panel: negative) who was sent in by his PMD for bradycardia. Neurology consulted for persistent fasciculations, since last year. No change in patient's exam, in regards to loss of strength, increased tone, or muscle atrophy but fasciculations are still present. ? motor neuron disease.    Plan:   1. Will obtain a EMG/NCS as an outpatient in our office   2. Will also repeat the paraneoplastic panel   3. Tilt table test to rule out autonomic dysfunction: + orthostatics based on HR, BP okay  - follow up results  4. PT/OT   5. Fall prevention precautions   6. No further inpatient work up at this time   7. Avoid delirium causing agents   - should continue Aricept, Nudexta 20-10mg 1 tab BID, and seroquel   8. Supportive care   9. D/c planning per primary team
Mr. Taylor is an 83 year old Male with Alzheimer's Dementia, BPH, GERD, HLD, kidney stones, and fasciculations (noted on previous admission at [facility] in September 2016. Work up included MRI Cspine: no spinal stenosis, paraneoplastic panel: negative) who was sent in by his PMD for bradycardia. Neurology consulted for persistent fasciculations, since last year. No change in patient's exam, in regards to loss of strength, increased tone, or muscle atrophy but fasciculations are still present. ? motor neuron disease.    Plan:   1. Will obtain a EMG/NCS as an outpatient in our office   2. Will also repeat the paraneoplastic panel   3. Tilt table test to rule out autonomic dysfunction: + orthostatics based on HR, BP okay  - will follow up results  4. PT/OT   5. Fall prevention precautions   6. No further inpatient work up at this time   7. Avoid delirium causing agents   - should continue Aricept, Nudexta 20-10mg 1 tab BID, and seroquel   8. Supportive care   9. D/c planning per primary team
SSS    continue current care  may d/c tele  d/c planning jose enrique once wife taught loop recorder instruction
SSS/ poss symp daniel    appreciate ep input  check orthostatics  check full tft
SSS/ symptomatic daniel  Falls  Fasciculations    neuro eval  continue current care  discussed with Dr penaloza, will get neuro eval and then re-evaluate for poss ppm.
SSS/sinus bradycardia  dementia    await echo and carotids  continue tele  continue current care  EP eval
Symptomatic Bradycardia    continue tele  cehck echo  check carotids  check anthony/pvr  cardio/ep eval
bradycardia    continue current care  s/p tilt and loop - f/u results   d/c planning
sss/symptomatic bradycardia  s/p fall - found on floor overnight  dementia    enhanced supervision  continue tele  await echo  await ep eval
83 year old male SSS likely symptomatic. No negative chronotropes

## 2017-06-30 NOTE — PROGRESS NOTE ADULT - ATTENDING COMMENTS
Please page [phone] with questions or call the office [phone].
Please page [phone] with questions or call the office [phone].
Please page [phone] with questions or call the office [phone].
Please page [phone] with questions or call the office [phone].

## 2017-06-30 NOTE — PROGRESS NOTE ADULT - PROBLEM SELECTOR PROBLEM 1
Sick sinus syndrome

## 2017-06-30 NOTE — PROGRESS NOTE ADULT - SUBJECTIVE AND OBJECTIVE BOX
********Cardiology Progress Note***************    CC: "slow heart rate"      INTERVAL HISTORY:  Feels well.  No CP/palps/LH      Allergies    IV dye (Rash)  penicillin (Rash)    Intolerances    	    MEDICATIONS:  heparin  Injectable 5000Unit(s) SubCutaneous every 12 hours        QUEtiapine 25milliGRAM(s) Oral at bedtime    famotidine    Tablet 20milliGRAM(s) Oral two times a day    atorvastatin 40milliGRAM(s) Oral at bedtime    potassium chloride    Tablet ER 10milliEquivalent(s) Oral daily  cholecalciferol 4000Unit(s) Oral daily      PAST MEDICAL & SURGICAL HISTORY:  Alzheimer disease: with psuedobulbular affect  BPH (benign prostatic hypertrophy)  Renal calculi  Urinary tract infection: currently on cipro  Hyperlipidemia  GERD (gastroesophageal reflux disease)  Nephrolithiasis  H/O cataract extraction, right  S/P tonsillectomy      FAMILY HISTORY:  No pertinent family history in first degree relatives      SOCIAL HISTORY:  unchanged    REVIEW OF SYSTEMS:  CONSTITUTIONAL: No fever, weight loss, or fatigue  EYES: No eye pain, visual disturbances, or discharge  RESPIRATORY: No cough, wheezing, chills or hemoptysis; No Shortness of Breath  CARDIOVASCULAR: see HPI. NO leg swelling  GASTROINTESTINAL: No abdominal or epigastric pain. No nausea, vomiting, or hematemesis; No diarrhea or constipation. No melena or hematochezia.  GENITOURINARY: No dysuria, frequency, hematuria, or incontinence  NEUROLOGICAL: Memory impaired. No headaches, loss of strength, numbness, or tremors  PSYCHIATRIC: No depression, anxiety, mood swings, or difficulty sleeping    [+ ] All others negative	      PHYSICAL EXAM:  T(C): 36.7, Max: 36.7 (06-24 @ 04:37)  HR: 87 (50 - 87)  BP: 111/76 (109/70 - 111/76)  RR: 18 (18 - 18)  SpO2: 95% (93% - 95%)  Wt(kg): --  I&O's Summary  I & Os for 24h ending 24 Jun 2017 07:00  =============================================  IN: 1020 ml / OUT: 0 ml / NET: 1020 ml    I & Os for current day (as of 24 Jun 2017 21:43)  =============================================  IN: 300 ml / OUT: 0 ml / NET: 300 ml      Appearance: NAD. No respiratory distress. Pleasant/calm	  HEENT:   Normal oral mucosa, PERRL, EOMI	  Cardiovascular: Normal S1 S2, No JVD, No murmurs, No edema  Respiratory: Lungs clear to auscultation	  Psychiatry: A & O x 3, Mood & affect appropriate  Gastrointestinal:  Soft, Non-tender, + BS	  Skin: No rashes, No ecchymoses, No cyanosis	  Neurologic: Non-focal  Extremities: Normal range of motion, No clubbing, cyanosis or edema  Vascular: Peripheral pulses palpable 2+ bilaterally      LABS:	 	    CBC Full  -  ( 24 Jun 2017 06:50 )  WBC Count : 6.4 K/uL  Hemoglobin : 14.9 g/dL  Hematocrit : 45.7 %  Platelet Count - Automated : 154 K/uL  Mean Cell Volume : 101.0 fl  Mean Cell Hemoglobin : 33.1 pg  Mean Cell Hemoglobin Concentration : 32.6 gm/dL  Auto Neutrophil # : 3.2 K/uL  Auto Lymphocyte # : 2.3 K/uL  Auto Monocyte # : 0.7 K/uL  Auto Eosinophil # : 0.1 K/uL  Auto Basophil # : 0.0 K/uL  Auto Neutrophil % : 50.7 %  Auto Lymphocyte % : 35.9 %  Auto Monocyte % : 10.8 %  Auto Eosinophil % : 2.0 %  Auto Basophil % : 0.6 %    06-24    142  |  104  |  27<H>  ----------------------------<  84  4.1   |  23  |  1.32<H>  06-23    141  |  104  |  23  ----------------------------<  84  4.2   |  23  |  1.22    Ca    9.5      24 Jun 2017 06:50  Ca    9.4      23 Jun 2017 06:59  Phos  4.0     06-24  Phos  3.6     06-23  Mg     1.9     06-24  Mg     1.9     06-23        TELEMETRY: 	SB 43-50 during sleep.  NSR 60-90 during waking hours
********Cardiology Progress Note***************    CC:no complaints      INTERVAL HISTORY:  Feels well.  s/p unwitnessed fall while he was attempting to get OOB.  Denies palps or LH and no dysrhythmia noted.     No chest, throat, jaw. arm or upper back pain.  No dyspnea, orthopnea, PND.  No edema. No palpitations syncope.     Allergies    IV dye (Rash)  penicillin (Rash)    Intolerances    	    MEDICATIONS:  heparin  Injectable 5000Unit(s) SubCutaneous every 12 hours        QUEtiapine 25milliGRAM(s) Oral at bedtime    famotidine    Tablet 20milliGRAM(s) Oral two times a day    atorvastatin 40milliGRAM(s) Oral at bedtime    potassium chloride    Tablet ER 10milliEquivalent(s) Oral daily  cholecalciferol 4000Unit(s) Oral daily      PAST MEDICAL & SURGICAL HISTORY:  Alzheimer disease: with psuedobulbular affect  BPH (benign prostatic hypertrophy)  Renal calculi  Urinary tract infection: currently on cipro  Hyperlipidemia  GERD (gastroesophageal reflux disease)  Nephrolithiasis  H/O cataract extraction, right  S/P tonsillectomy      FAMILY HISTORY:  No pertinent family history in first degree relatives      SOCIAL HISTORY:  unchanged    REVIEW OF SYSTEMS:    GEN:  Appetite normal.  No fever or chills.  HEENT:  No visual change or epistaxis.  LUNGS:  No cough, wheeze or hemoptysis  CARDIAC:  see HPI  GI: No N/V.  No diarrhea or constipation.  No melena or hematochezia  : No dysuria or hematuria  NEURO:  No amaurosis or speech disturbance.  No new focal weakness or sensory sxss.  PSYCH:  No anxiety or depression  SKIN: no rash.  No new ecchymoses. 	    [+ ] All others negative	    PHYSICAL EXAM:  T(C): 36.8, Max: 36.9 (06-25 @ 09:12)  HR: 57 (47 - 79)  BP: 113/73 (100/61 - 149/72)  RR: 18 (18 - 18)  SpO2: 95% (94% - 98%)  Wt(kg): --  I&O's Summary  I & Os for 24h ending 25 Jun 2017 07:00  =============================================  IN: 300 ml / OUT: 0 ml / NET: 300 ml    I & Os for current day (as of 25 Jun 2017 20:24)  =============================================  IN: 960 ml / OUT: 580 ml / NET: 380 ml      Appearance: NAD. No respiratory distress	  HEENT:   Normal oral mucosa, PERRL, EOMI	  Lymphatic: No lymphadenopathy  Cardiovascular: Normal S1 S2, No JVD, No murmurs, No edema  Respiratory: Lungs clear to auscultation	  Psychiatry: A & O x 3, Mood & affect appropriate  Gastrointestinal:  Soft, Non-tender, + BS	  Skin: No rashes, No ecchymoses, No cyanosis	  Neurologic: Non-focal  Extremities: Normal range of motion, No clubbing, cyanosis or edema  Vascular: Peripheral pulses palpable 2+ bilaterally      LABS:	 	    CBC Full  -  ( 25 Jun 2017 06:54 )  WBC Count : 7.0 K/uL  Hemoglobin : 15.5 g/dL  Hematocrit : 45.0 %  Platelet Count - Automated : 145 K/uL  Mean Cell Volume : 102.0 fl  Mean Cell Hemoglobin : 35.0 pg  Mean Cell Hemoglobin Concentration : 34.4 gm/dL  Auto Neutrophil # : x  Auto Lymphocyte # : x  Auto Monocyte # : x  Auto Eosinophil # : x  Auto Basophil # : x  Auto Neutrophil % : x  Auto Lymphocyte % : x  Auto Monocyte % : x  Auto Eosinophil % : x  Auto Basophil % : x    06-25    141  |  104  |  24<H>  ----------------------------<  87  4.2   |  23  |  1.27  06-24      Ca    9.8      25 Jun 2017 06:54  Ca    9.5      24 Jun 2017 06:50  Phos  3.2     06-25  Phos  4.0     06-24  Mg     2.0     06-25  Mg     1.9     06-24    Venous duplex  No evidence of bilateral lower extremity deep venous thrombosis.  	  TELE:  46  110
CC: Bradycardia, falls    Interval History: No significant cardiac events overnight.     MEDICATIONS:  atorvastatin 40milliGRAM(s) Oral at bedtime  QUEtiapine 25milliGRAM(s) Oral at bedtime  famotidine    Tablet 20milliGRAM(s) Oral two times a day  potassium chloride    Tablet ER 10milliEquivalent(s) Oral daily  cholecalciferol 4000Unit(s) Oral daily  heparin  Injectable 5000Unit(s) SubCutaneous every 12 hours      LABS:      140  |  104  |  24<H>  ----------------------------<  90  4.3   |  22  |  1.34<H>    Ca    9.6      2017 06:47  Phos  3.5       Mg     2.0                                 15.9   8.5   )-----------( 168      ( 2017 06:47 )             47.5       VITAL SIGNS:   T(C): 37.1, Max: 37.1 ( @ 13:54)  HR: 95 (57 - 95)  BP: 110/70 (100/63 - 113/73)  RR: 17 (17 - 18)  SpO2: 95% (95% - 96%)  Wt(kg): --  Daily     Daily Weight in k (2017 03:31)  I&O's Summary  I & Os for 24h ending 2017 07:00  =============================================  IN: 1130 ml / OUT: 580 ml / NET: 550 ml    I & Os for current day (as of 2017 14:34)  =============================================  IN: 840 ml / OUT: 0 ml / NET: 840 ml      TELE: No significant ectopy. Transient daniel overnight.
CC: Falls, asymptomatic sinus bradycardia    Interval History: No significant cardiac events overnight.     MEDICATIONS:  atorvastatin 40 milliGRAM(s) Oral at bedtime  QUEtiapine 25 milliGRAM(s) Oral at bedtime  famotidine    Tablet 20 milliGRAM(s) Oral two times a day  potassium chloride    Tablet ER 10 milliEquivalent(s) Oral daily  cholecalciferol 4000 Unit(s) Oral daily  heparin  Injectable 5000 Unit(s) SubCutaneous every 12 hours      LABS:      141  |  105  |  21  ----------------------------<  93  4.4   |  23  |  1.27    Ca    9.7      2017 05:51                            16.6   8.0   )-----------( 150      ( 2017 05:51 )             47.6     < from: Transthoracic Echocardiogram (17 @ 17:12) >  1. Mitral annular calcification, otherwise normal mitral  valve. Minimal mitral regurgitation.  2. Aortic valve not well visualized; appears calcified.  Minimal aortic regurgitation.  3. Endocardium not well visualized; left ventricle appears  hyperdynamic. EF approximately 75-80% by visualestimation.  Patient was unable to consent for use of intravenous echo  contrast.  4. The right ventricle is not well visualized; grossly  normal right ventricular size and systolic function.    < end of copied text >            VITAL SIGNS:   T(C): 36.7 (17 @ 08:44), Max: 36.7 (17 @ 13:06)  HR: 61 (17 @ 05:30) (51 - 83)  BP: 107/67 (17 @ 05:30) (87/47 - 114/74)  RR: 18 (17 @ 08:44) (18 - 18)  SpO2: 96% (17 @ 08:44) (96% - 99%)  Daily     Daily Weight in k.8 (2017 06:28)  I&O's Summary    2017 07:01  -  2017 07:00  --------------------------------------------------------  IN: 1180 mL / OUT: 0 mL / NET: 1180 mL    2017 07:  -  2017 10:59  --------------------------------------------------------  IN: 0 mL / OUT: 0 mL / NET: 0 mL        TELE: No significant ectopy
CC: Falls, bradycardia    Interval History: This am while in the bathroom having a BM patient had sinus tach at 140. He denied cardiac symptoms. No lightheadedness. He had HR in 40s overnight. Again with out symptoms. Tilt showed no drop in BP and appropriate rise in heart rate.     MEDICATIONS:  atorvastatin 40 milliGRAM(s) Oral at bedtime  QUEtiapine 25 milliGRAM(s) Oral at bedtime  famotidine    Tablet 20 milliGRAM(s) Oral two times a day  potassium chloride    Tablet ER 10 milliEquivalent(s) Oral daily  cholecalciferol 4000 Unit(s) Oral daily  heparin  Injectable 5000 Unit(s) SubCutaneous every 12 hours  docusate sodium 100 milliGRAM(s) Oral two times a day  senna 2 Tablet(s) Oral at bedtime      LABS:  06-28    141  |  105  |  21  ----------------------------<  93  4.4   |  23  |  1.27    Ca    9.7      28 Jun 2017 05:51                            16.6   8.0   )-----------( 150      ( 28 Jun 2017 05:51 )             47.6       VITAL SIGNS:   T(C): 36.8 (06-29-17 @ 12:14), Max: 36.8 (06-28-17 @ 20:40)  HR: 80 (06-29-17 @ 12:14) (51 - 81)  BP: 107/75 (06-29-17 @ 12:14) (107/75 - 127/89)  RR: 17 (06-29-17 @ 12:14) (17 - 18)  SpO2: 96% (06-29-17 @ 12:14) (94% - 99%)  Daily     Daily   I&O's Summary    28 Jun 2017 07:01  -  29 Jun 2017 07:00  --------------------------------------------------------  IN: 200 mL / OUT: 0 mL / NET: 200 mL    29 Jun 2017 07:01  -  29 Jun 2017 16:40  --------------------------------------------------------  IN: 600 mL / OUT: 0 mL / NET: 600 mL        TELE: Sinus daniel, sinus tach.
CC: bradycardia    Interval History: No significant cardiac events overnight.     MEDICATIONS:  atorvastatin 40 milliGRAM(s) Oral at bedtime  QUEtiapine 25 milliGRAM(s) Oral at bedtime  famotidine    Tablet 20 milliGRAM(s) Oral two times a day  potassium chloride    Tablet ER 10 milliEquivalent(s) Oral daily  cholecalciferol 4000 Unit(s) Oral daily  heparin  Injectable 5000 Unit(s) SubCutaneous every 12 hours  docusate sodium 100 milliGRAM(s) Oral two times a day  senna 2 Tablet(s) Oral at bedtime      LABS:      VITAL SIGNS:   T(C): 36.8 (17 @ 05:02), Max: 36.9 (17 @ 21:31)  HR: 62 (17 @ 05:02) (62 - 90)  BP: 101/65 (17 @ 05:02) (101/65 - 107/75)  RR: 16 (17 @ 05:02) (16 - 18)  SpO2: 95% (17 @ 05:02) (94% - 96%)  Daily     Daily Weight in k.8 (2017 06:05)  I&O's Summary    2017 07:  -  2017 07:00  --------------------------------------------------------  IN: 1000 mL / OUT: 0 mL / NET: 1000 mL    2017 07:  -  2017 10:35  --------------------------------------------------------  IN: 340 mL / OUT: 0 mL / NET: 340 mL        TELE: Sinus tach while in the bathroom. Sinus to sinus daniel in the 50s at rest. PVCs
MEDICINE, PROGRESS NOTE [id number]    EVELIN CHAVEZ 83y [id number]    Patient seen and examined.  Patient is a 83y old  Male who presents with a chief complaint of sent in for bradycardia from pmd (22 Jun 2017 18:33)  Pt pleasantly confused    PAST MEDICAL & SURGICAL HISTORY:  Alzheimer disease: with psuedobulbular affect  BPH (benign prostatic hypertrophy)  Renal calculi  Urinary tract infection: currently on cipro  Hyperlipidemia  GERD (gastroesophageal reflux disease)  Nephrolithiasis  H/O cataract extraction, right  S/P tonsillectomy    MEDICATIONS  (STANDING):  atorvastatin 40milliGRAM(s) Oral at bedtime  QUEtiapine 25milliGRAM(s) Oral at bedtime  famotidine    Tablet 20milliGRAM(s) Oral two times a day  potassium chloride    Tablet ER 10milliEquivalent(s) Oral daily  cholecalciferol 4000Unit(s) Oral daily  heparin  Injectable 5000Unit(s) SubCutaneous every 12 hours    MEDICATIONS  (PRN):    Allergies    IV dye (Rash)  penicillin (Rash)    Intolerances        PHYSICAL EXAM:  Constitutional: NAD  HEENT: Normocephalic, EOMI  Neck:  No JVD  Respiratory: CTA B/L, No wheezes  Cardiovascular: S1, S2, RRR, + systolic murmur  Gastrointestinal: BS+, soft, NT/ND  Extremities: No peripheral edema  Neurological: AAOX1, no focal deficits  Psychiatric: Normal mood, normal affect  : No Barone    Vital Signs Last 24 Hrs  T(C): 36.9, Max: 36.9 (06-25 @ 09:12)  T(F): 98.4, Max: 98.5 (06-25 @ 09:12)  HR: 79 (47 - 79)  BP: 110/84 (100/61 - 149/72)  BP(mean): --  RR: 18 (18 - 18)  SpO2: 96% (94% - 98%)  I&O's Summary  I & Os for 24h ending 25 Jun 2017 07:00  =============================================  IN: 300 ml / OUT: 0 ml / NET: 300 ml    I & Os for current day (as of 25 Jun 2017 15:48)  =============================================  IN: 480 ml / OUT: 580 ml / NET: -100 ml      LABS:                        15.5   7.0   )-----------( 145      ( 25 Jun 2017 06:54 )             45.0     06-25    141  |  104  |  24<H>  ----------------------------<  87  4.2   |  23  |  1.27    Ca    9.8      25 Jun 2017 06:54  Phos  3.2     06-25  Mg     2.0     06-25          Magnesium, Serum: 2.0 mg/dL (06-25 @ 06:54)        REVIEW OF SYSTEMS:      RADIOLOGY & ADDITIONAL STUDIES:  LE doppler IMPRESSION:     No evidence of bilateral lower extremity deep venous thrombosis.      HOLLY TENORIO M.D., ATTENDING RADIOLOGIST  This documenthas been electronically signed. Jun 25 2017 12:23PM    Pelvic xray IMPRESSION:     No evidence of an acute fracture or dislocation. Preserved hip joint   spaces.    SRAVANI CONNOLLY M.D., ATTENDING RADIOLOGIST  This document has been electronically signed. Jun 25 2017  2:07PM      ASSESSMENT/PLAN:
MEDICINE, PROGRESS NOTE [phone]    EVELIN CHAVEZ 83y [id number]    Patient seen and examined.  Patient is a 83y old  Male who presents with a chief complaint of sent in for bradycardia from pmd (26 Jun 2017 13:27)  Pt resting in bed with fasciculations.      PAST MEDICAL & SURGICAL HISTORY:  Alzheimer disease: with psuedobulbular affect  BPH (benign prostatic hypertrophy)  Renal calculi  Urinary tract infection: currently on cipro  Hyperlipidemia  GERD (gastroesophageal reflux disease)  Nephrolithiasis  H/O cataract extraction, right  S/P tonsillectomy    MEDICATIONS  (STANDING):  atorvastatin 40milliGRAM(s) Oral at bedtime  QUEtiapine 25milliGRAM(s) Oral at bedtime  famotidine    Tablet 20milliGRAM(s) Oral two times a day  potassium chloride    Tablet ER 10milliEquivalent(s) Oral daily  cholecalciferol 4000Unit(s) Oral daily  heparin  Injectable 5000Unit(s) SubCutaneous every 12 hours    MEDICATIONS  (PRN):    Allergies    IV dye (Rash)  penicillin (Rash)    Intolerances        PHYSICAL EXAM:  Constitutional: NAD  HEENT: Normocephalic, EOMI  Neck:  No JVD  Respiratory: CTA B/L, No wheezes  Cardiovascular: S1, S2, RRR, + systolic murmur  Gastrointestinal: BS+, soft, NT/ND  Extremities: No peripheral edema  Neurological: AAOX2, no focal deficits, + fasciculations in both calfs L>R  Psychiatric: Normal mood, normal affect  : No Barone    Vital Signs Last 24 Hrs  T(C): 37.1, Max: 37.1 (06-26 @ 13:54)  T(F): 98.8, Max: 98.8 (06-26 @ 13:54)  HR: 95 (57 - 95)  BP: 110/70 (100/63 - 113/73)  BP(mean): --  RR: 17 (17 - 18)  SpO2: 95% (95% - 96%)  I&O's Summary  I & Os for 24h ending 26 Jun 2017 07:00  =============================================  IN: 1130 ml / OUT: 580 ml / NET: 550 ml    I & Os for current day (as of 26 Jun 2017 17:38)  =============================================  IN: 960 ml / OUT: 0 ml / NET: 960 ml      LABS:                        15.9   8.5   )-----------( 168      ( 26 Jun 2017 06:47 )             47.5     06-26    140  |  104  |  24<H>  ----------------------------<  90  4.3   |  22  |  1.34<H>    Ca    9.6      26 Jun 2017 06:47  Phos  3.5     06-26  Mg     2.0     06-26          Magnesium, Serum: 2.0 mg/dL (06-26 @ 06:47)        REVIEW OF SYSTEMS:      RADIOLOGY & ADDITIONAL STUDIES:  EXAM:  PHYSIOL EXTREM LOW 3+ LEV BI                          WOODY/PVR    *** ADDENDUM 06/26/2017  ***    Impression: Lower extremity ARTERIAL disease is NOT identified.      *** END OF ADDENDUM 06/26/2017  ***      PROCEDURE DATE:  06/23/2017      INTERPRETATION:  History: Bradycardia, cold cyanotic toes, evaluate for   lower extremity arterial disease.    The right ankle-brachial index equals 1.25; the left ankle-brachial index   equals 1.17.    The blood pressure measurements at the right ankle are 145 mmHg in the   posterior tibial artery and 138 mmHg in the dorsal pedis artery.    The blood pressure measurements at the left ankle are 136 mmHg in the   posterior tibial artery and 134 mmHg in the dorsal pedis artery.    The amplitude of the pulse volume recordings are normal from the upper   thighs through the ankles and slightly reduced at the levels of the   metatarsals and toes.    Impression: Lower extremity is NOT identified.    ***Please see the addendum at the top of this report. It may contain   additional important information or changes.****    JESSICA DIAZ M.D., ATTENDING RADIOLOGIST  This document has been electronically signed. Jun 26 2017 10:33AM  Addend:  JESSICA DIAZ M.D., ATTENDING RADIOLOGIST  This addendum was electronically signed on: Jun 26 2017  5:22PM.              Carotid doppler IMPRESSION: No hemodynamically significant carotid artery stenoses.    Measurement of carotid stenosis is based on velocity parameters that   correlate the residual internal carotid diameter with that of the more   distal vessel in accordance with a method such as the [place] [employer]   Symptomatic Carotid Endarterectomy Trial (NASCET).    JESSICA DIAZ M.D., ATTENDING RADIOLOGIST  This document has been electronically signed. Jun 26 2017 10:29AM                ASSESSMENT/PLAN:
MEDICINE, PROGRESS NOTE [phone]    EVELIN CHAVEZ 83y [id number]    Patient seen and examined.  Patient is a 83y old  Male who presents with a chief complaint of sent in for bradycardia from pmd (22 Jun 2017 18:33)  Pt pleasantly confused.  No current complaints      PAST MEDICAL & SURGICAL HISTORY:  Alzheimer disease: with psuedobulbular affect  BPH (benign prostatic hypertrophy)  Renal calculi  Urinary tract infection: currently on cipro  Hyperlipidemia  GERD (gastroesophageal reflux disease)  Nephrolithiasis  H/O cataract extraction, right  S/P tonsillectomy    MEDICATIONS  (STANDING):  atorvastatin 40milliGRAM(s) Oral at bedtime  QUEtiapine 25milliGRAM(s) Oral at bedtime  famotidine    Tablet 20milliGRAM(s) Oral two times a day  potassium chloride    Tablet ER 10milliEquivalent(s) Oral daily  cholecalciferol 4000Unit(s) Oral daily  heparin  Injectable 5000Unit(s) SubCutaneous every 12 hours    MEDICATIONS  (PRN):    Allergies    IV dye (Rash)  penicillin (Rash)    Intolerances        PHYSICAL EXAM:  Constitutional: NAD  HEENT: Normocephalic, EOMI  Neck:  No JVD  Respiratory: CTA B/L, No wheezes  Cardiovascular: S1, S2, RRR, + systolic murmur  Gastrointestinal: BS+, soft, NT/ND  Extremities: No peripheral edema  Neurological: AAOX3, no focal deficits  Psychiatric: Normal mood, flat affect  : No Barone    Vital Signs Last 24 Hrs  T(C): 36.7, Max: 36.7 (06-23 @ 20:45)  T(F): 98, Max: 98.1 (06-23 @ 20:45)  HR: 87 (50 - 87)  BP: 111/76 (109/70 - 126/76)  BP(mean): --  RR: 18 (18 - 18)  SpO2: 95% (93% - 95%)  I&O's Summary    I & Os for current day (as of 24 Jun 2017 17:53)  =============================================  IN: 1020 ml / OUT: 0 ml / NET: 1020 ml      LABS:                        14.9   6.4   )-----------( 154      ( 24 Jun 2017 06:50 )             45.7     06-24    142  |  104  |  27<H>  ----------------------------<  84  4.1   |  23  |  1.32<H>    Ca    9.5      24 Jun 2017 06:50  Phos  4.0     06-24  Mg     1.9     06-24      CARDIAC MARKERS ( 22 Jun 2017 22:42 )  x     / <0.01 ng/mL / x     / x     / x          Magnesium, Serum: 1.9 mg/dL (06-24 @ 06:50)        REVIEW OF SYSTEMS:      RADIOLOGY & ADDITIONAL STUDIES:      ASSESSMENT/PLAN:
MEDICINE, PROGRESS NOTE [phone]    EVELIN CHAVEZ 83y [id number]    Patient seen and examined.  Patient is a 83y old  Male who presents with a chief complaint of sent in for bradycardia from pmd (26 Jun 2017 13:27)  Pleasantly confused.     PAST MEDICAL & SURGICAL HISTORY:  Alzheimer disease: with psuedobulbular affect  BPH (benign prostatic hypertrophy)  Renal calculi  Hyperlipidemia  GERD (gastroesophageal reflux disease)  Nephrolithiasis  H/O cataract extraction, right  S/P tonsillectomy    MEDICATIONS  (STANDING):  atorvastatin 40 milliGRAM(s) Oral at bedtime  QUEtiapine 25 milliGRAM(s) Oral at bedtime  famotidine    Tablet 20 milliGRAM(s) Oral two times a day  potassium chloride    Tablet ER 10 milliEquivalent(s) Oral daily  cholecalciferol 4000 Unit(s) Oral daily  heparin  Injectable 5000 Unit(s) SubCutaneous every 12 hours    MEDICATIONS  (PRN):    Allergies    IV dye (Rash)  penicillin (Rash)    Intolerances        PHYSICAL EXAM:  Constitutional: NAD  HEENT: Normocephalic, EOMI  Neck:  No JVD  Respiratory: CTA B/L, No wheezes  Cardiovascular: S1, S2, RRR, + systolic murmur  Gastrointestinal: BS+, soft, NT/ND  Extremities: No peripheral edema  Neurological: AAOX2, no focal deficits  Psychiatric: Normal mood, normal affect  : No Barone    Vital Signs Last 24 Hrs  T(C): 36.7 (28 Jun 2017 17:04), Max: 36.7 (28 Jun 2017 08:44)  T(F): 98 (28 Jun 2017 17:04), Max: 98.1 (28 Jun 2017 08:44)  HR: 51 (28 Jun 2017 17:04) (51 - 81)  BP: 124/73 (28 Jun 2017 17:04) (87/47 - 124/73)  BP(mean): --  RR: 18 (28 Jun 2017 17:04) (18 - 18)  SpO2: 99% (28 Jun 2017 17:04) (95% - 99%)  I&O's Summary    27 Jun 2017 07:01  -  28 Jun 2017 07:00  --------------------------------------------------------  IN: 1180 mL / OUT: 0 mL / NET: 1180 mL    28 Jun 2017 07:01  -  28 Jun 2017 18:37  --------------------------------------------------------  IN: 0 mL / OUT: 0 mL / NET: 0 mL        LABS:                        16.6   8.0   )-----------( 150      ( 28 Jun 2017 05:51 )             47.6     06-28    141  |  105  |  21  ----------------------------<  93  4.4   |  23  |  1.27    Ca    9.7      28 Jun 2017 05:51                REVIEW OF SYSTEMS:      RADIOLOGY & ADDITIONAL STUDIES:      ASSESSMENT/PLAN:
MEDICINE, PROGRESS NOTE [phone]    EVELIN CHAVEZ 83y [id number]    Patient seen and examined.  Patient is a 83y old  Male who presents with a chief complaint of sent in for bradycardia from pmd (26 Jun 2017 13:27)  Wife states pt started c/o of dizziness and sweating while seated in chair earlier today.      PAST MEDICAL & SURGICAL HISTORY:  Alzheimer disease: with psuedobulbular affect  BPH (benign prostatic hypertrophy)  Renal calculi  Hyperlipidemia  GERD (gastroesophageal reflux disease)  Nephrolithiasis  H/O cataract extraction, right  S/P tonsillectomy    MEDICATIONS  (STANDING):  atorvastatin 40 milliGRAM(s) Oral at bedtime  QUEtiapine 25 milliGRAM(s) Oral at bedtime  famotidine    Tablet 20 milliGRAM(s) Oral two times a day  potassium chloride    Tablet ER 10 milliEquivalent(s) Oral daily  cholecalciferol 4000 Unit(s) Oral daily  heparin  Injectable 5000 Unit(s) SubCutaneous every 12 hours    MEDICATIONS  (PRN):    Allergies    IV dye (Rash)  penicillin (Rash)    Intolerances        PHYSICAL EXAM:  Constitutional: NAD  HEENT: Normocephalic, EOMI  Neck:  No JVD  Respiratory: CTA B/L, No wheezes  Cardiovascular: S1, S2, RRR, + systolic murmur  Gastrointestinal: BS+, soft, NT/ND  Extremities: No peripheral edema  Neurological: AAOX2, no focal deficits, +fasciculations  Psychiatric: Normal mood, normal affect  : No Barone    Vital Signs Last 24 Hrs  T(C): 36.7 (27 Jun 2017 13:06), Max: 36.8 (26 Jun 2017 20:33)  T(F): 98.1 (27 Jun 2017 13:06), Max: 98.2 (26 Jun 2017 20:33)  HR: 83 (27 Jun 2017 13:06) (50 - 83)  BP: 110/70 (27 Jun 2017 13:06) (107/64 - 110/70)  BP(mean): --  RR: 18 (27 Jun 2017 13:06) (18 - 18)  SpO2: 96% (27 Jun 2017 13:06) (95% - 96%)  I&O's Summary    26 Jun 2017 07:01  -  27 Jun 2017 07:00  --------------------------------------------------------  IN: 1180 mL / OUT: 0 mL / NET: 1180 mL    27 Jun 2017 07:01  -  27 Jun 2017 19:17  --------------------------------------------------------  IN: 1080 mL / OUT: 0 mL / NET: 1080 mL        LABS:                        15.9   8.5   )-----------( 168      ( 26 Jun 2017 06:47 )             47.5     06-27    141  |  106  |  23  ----------------------------<  87  4.4   |  20<L>  |  1.25    Ca    9.3      27 Jun 2017 07:04  Phos  3.5     06-26  Mg     2.0     06-26                REVIEW OF SYSTEMS:      RADIOLOGY & ADDITIONAL STUDIES:      ASSESSMENT/PLAN:
MEDICINE, PROGRESS NOTE [phone]    EVELIN CHAVEZ 83y [id number]    Patient seen and examined.  Patient is a 83y old  Male who presents with a chief complaint of sent in for bradycardia from pmd (26 Jun 2017 13:27)  Pt pleasantly confused.    PAST MEDICAL & SURGICAL HISTORY:  Alzheimer disease: with psuedobulbular affect  BPH (benign prostatic hypertrophy)  Renal calculi  Hyperlipidemia  GERD (gastroesophageal reflux disease)  Nephrolithiasis  H/O cataract extraction, right  S/P tonsillectomy    MEDICATIONS  (STANDING):  atorvastatin 40 milliGRAM(s) Oral at bedtime  QUEtiapine 25 milliGRAM(s) Oral at bedtime  famotidine    Tablet 20 milliGRAM(s) Oral two times a day  potassium chloride    Tablet ER 10 milliEquivalent(s) Oral daily  cholecalciferol 4000 Unit(s) Oral daily  heparin  Injectable 5000 Unit(s) SubCutaneous every 12 hours  docusate sodium 100 milliGRAM(s) Oral two times a day  senna 2 Tablet(s) Oral at bedtime    MEDICATIONS  (PRN):    Allergies    IV dye (Rash)  penicillin (Rash)    Intolerances        PHYSICAL EXAM:  Constitutional: NAD  HEENT: Normocephalic, EOMI  Neck:  No JVD  Respiratory: CTA B/L, No wheezes  Cardiovascular: S1, S2, RRR, + systolic murmur  Gastrointestinal: BS+, soft, NT/ND  Extremities: No peripheral edema  Neurological: AAOX2, no focal deficits  : No Barone    Vital Signs Last 24 Hrs  T(C): 36.8 (29 Jun 2017 12:14), Max: 36.8 (28 Jun 2017 20:40)  T(F): 98.2 (29 Jun 2017 12:14), Max: 98.3 (28 Jun 2017 20:40)  HR: 80 (29 Jun 2017 12:14) (69 - 81)  BP: 107/75 (29 Jun 2017 12:14) (107/75 - 127/89)  BP(mean): --  RR: 17 (29 Jun 2017 12:14) (17 - 18)  SpO2: 96% (29 Jun 2017 12:14) (94% - 96%)  I&O's Summary    28 Jun 2017 07:01  -  29 Jun 2017 07:00  --------------------------------------------------------  IN: 200 mL / OUT: 0 mL / NET: 200 mL    29 Jun 2017 07:01  -  29 Jun 2017 19:12  --------------------------------------------------------  IN: 1000 mL / OUT: 0 mL / NET: 1000 mL        LABS:                        16.6   8.0   )-----------( 150      ( 28 Jun 2017 05:51 )             47.6     06-28    141  |  105  |  21  ----------------------------<  93  4.4   |  23  |  1.27    Ca    9.7      28 Jun 2017 05:51                REVIEW OF SYSTEMS:      RADIOLOGY & ADDITIONAL STUDIES:      ASSESSMENT/PLAN:
MEDICINE, PROGRESS NOTE [phone]    EVELIN CHAVEZ 83y [id number]    Patient seen and examined.  Patient is a 83y old  Male who presents with a chief complaint of sent in for bradycardia from pmd (22 Jun 2017 18:33)  Pt pleasantly confused.    PAST MEDICAL & SURGICAL HISTORY:  Alzheimer disease: with psuedobulbular affect  BPH (benign prostatic hypertrophy)  Renal calculi  Urinary tract infection: currently on cipro  Hyperlipidemia  GERD (gastroesophageal reflux disease)  Nephrolithiasis  H/O cataract extraction, right  S/P tonsillectomy    MEDICATIONS  (STANDING):  atorvastatin 40milliGRAM(s) Oral at bedtime  QUEtiapine 25milliGRAM(s) Oral at bedtime  famotidine    Tablet 20milliGRAM(s) Oral two times a day  potassium chloride    Tablet ER 10milliEquivalent(s) Oral daily  cholecalciferol 4000Unit(s) Oral daily  heparin  Injectable 5000Unit(s) SubCutaneous every 12 hours    MEDICATIONS  (PRN):    Allergies    IV dye (Rash)  penicillin (Rash)    Intolerances        PHYSICAL EXAM:  Constitutional: NAD  HEENT: Normocephalic, EOMI  Neck:  No JVD  Respiratory: CTA B/L, No wheezes  Cardiovascular: S1, S2, RRR, + systolic murmur  Gastrointestinal: BS+, soft, NT/ND  Extremities: No peripheral edema  Neurological: AAOX3, no focal deficits  Psychiatric: Normal mood, normal affect  : No Barone    Vital Signs Last 24 Hrs  T(C): 36.7, Max: 36.9 (06-22 @ 20:56)  T(F): 98, Max: 98.4 (06-22 @ 20:56)  HR: 59 (43 - 59)  BP: 111/72 (107/68 - 138/69)  BP(mean): --  RR: 18 (13 - 18)  SpO2: 94% (93% - 98%)  I&O's Summary  I & Os for 24h ending 23 Jun 2017 07:00  =============================================  IN: 0 ml / OUT: 250 ml / NET: -250 ml    I & Os for current day (as of 23 Jun 2017 14:57)  =============================================  IN: 740 ml / OUT: 0 ml / NET: 740 ml      LABS:                        14.7   6.8   )-----------( 156      ( 23 Jun 2017 06:59 )             43.6     06-23    141  |  104  |  23  ----------------------------<  84  4.2   |  23  |  1.22    Ca    9.4      23 Jun 2017 06:59  Phos  3.6     06-23  Mg     1.9     06-23    TPro  7.2  /  Alb  4.1  /  TBili  0.5  /  DBili  x   /  AST  29  /  ALT  46<H>  /  AlkPhos  65  06-22    CARDIAC MARKERS ( 22 Jun 2017 22:42 )  x     / <0.01 ng/mL / x     / x     / x      CARDIAC MARKERS ( 22 Jun 2017 16:43 )  x     / <0.01 ng/mL / x     / x     / x          Magnesium, Serum: 1.9 mg/dL (06-23 @ 06:59)        REVIEW OF SYSTEMS:      RADIOLOGY & ADDITIONAL STUDIES:      ASSESSMENT/PLAN:
S: No new complaints. Planned to receive a tilt table test today, so is NPO. Frustrated by not being able to eat.    Medications:  atorvastatin 40 milliGRAM(s) Oral at bedtime  QUEtiapine 25 milliGRAM(s) Oral at bedtime  famotidine    Tablet 20 milliGRAM(s) Oral two times a day  potassium chloride    Tablet ER 10 milliEquivalent(s) Oral daily  cholecalciferol 4000 Unit(s) Oral daily  heparin  Injectable 5000 Unit(s) SubCutaneous every 12 hours      Labs:  CBC Full  -  ( 28 Jun 2017 05:51 )  WBC Count : 8.0 K/uL  Hemoglobin : 16.6 g/dL  Hematocrit : 47.6 %  Platelet Count - Automated : 150 K/uL  Mean Cell Volume : 103.0 fl  Mean Cell Hemoglobin : 35.9 pg  Mean Cell Hemoglobin Concentration : 35.0 gm/dL    06-28    141  |  105  |  21  ----------------------------<  93  4.4   |  23  |  1.27    Ca    9.7      28 Jun 2017 05:51    TSH: 1.68  B12 (9/2016) 513  Folate (9/2016) 18.7  Paraneoplastic panel (9/2016): negative     Vitals:  Vital Signs Last 24 Hrs  T(C): 36.7 (28 Jun 2017 08:44), Max: 36.7 (27 Jun 2017 13:06)  T(F): 98.1 (28 Jun 2017 08:44), Max: 98.1 (27 Jun 2017 13:06)  HR: 61 (28 Jun 2017 05:30) (51 - 83)  BP: 107/67 (28 Jun 2017 05:30) (87/47 - 114/74)  BP(mean): --  RR: 18 (28 Jun 2017 08:44) (18 - 18)  SpO2: 96% (28 Jun 2017 08:44) (96% - 99%)    NEUROLOGICAL EXAM:    Mental status: Awake, alert, and in no apparent distress. Oriented to person, knows it is a hospital, but thinks it is [facility]. Oriented to June 2017. Thinks it is Thursday, the middle of June. Language function is normal: fluent and repetition intact. Follows commands. No neglect. Attention is intact: able to name 7 days of week backwards, correctly.    Cranial Nerves: Extraocular movements were intact. Visual field were full to blink to threat bilaterally. Fundoscopic exam was deferred. Facial sensation was intact to light touch. There was no facial asymmetry. The palate was upgoing symmetrically and tongue was midline. Hearing acuity was intact to finger rub AU. Shoulder shrug was full bilaterally    Motor exam: Bulk and tone were normal. Strength was 5/5 in all four extremities, including the intrinsic muscles of the hands. No atrophy noted. Fine finger movements were symmetric and normal. There was no pronator drift. No postural or action tremor. Noted to have spontaneous fasciculations in the LE.     Reflexes: Absent throughout. Toes were downgoing on the left but upgoing on the right.     Sensation: Intact to light touch x 4 extremities. Decreased vibration in the LE up to level of the knees.    Coordination: Finger-nose-finger was without dysmetria. No myoclonus.      Gait: deferred
S: No new complaints. Pt says feels well. was in chair      Medications:  atorvastatin 40 milliGRAM(s) Oral at bedtime  QUEtiapine 25 milliGRAM(s) Oral at bedtime  famotidine    Tablet 20 milliGRAM(s) Oral two times a day  potassium chloride    Tablet ER 10 milliEquivalent(s) Oral daily  cholecalciferol 4000 Unit(s) Oral daily  heparin  Injectable 5000 Unit(s) SubCutaneous every 12 hours  docusate sodium 100 milliGRAM(s) Oral two times a day  senna 2 Tablet(s) Oral at bedtime      Labs:        Vitals:  Vital Signs Last 24 Hrs  T(C): 36.8 (30 Jun 2017 05:02), Max: 36.9 (29 Jun 2017 21:31)  T(F): 98.2 (30 Jun 2017 05:02), Max: 98.4 (29 Jun 2017 21:31)  HR: 62 (30 Jun 2017 05:02) (62 - 90)  BP: 101/65 (30 Jun 2017 05:02) (101/65 - 107/75)  BP(mean): --  RR: 16 (30 Jun 2017 05:02) (16 - 18)  SpO2: 95% (30 Jun 2017 05:02) (94% - 96%)    NEUROLOGICAL EXAM:    Mental status: Awake, alert, and in no apparent distress. Oriented to person, knows it is a [facility], [place], June 28th, Thursday, 2017. Language function is normal: fluent and repetition intact. Follows commands. No neglect.     Cranial Nerves: Extraocular movements were intact. Visual field were full to blink to threat bilaterally. Facial sensation was intact to light touch. There was no facial asymmetry. The palate was upgoing symmetrically and tongue was midline. Hearing acuity was intact to finger rub AU. Shoulder shrug was full bilaterally    Motor exam: Bulk and tone were normal. Strength was 5/5 in all four extremities, including the intrinsic muscles of the hands. No atrophy noted. Fine finger movements were symmetric and normal. There was no pronator drift. No postural or action tremor. Noted to have spontaneous fasciculations in the LE.     Reflexes: Absent throughout. Toes were downgoing on the left but upgoing on the right.     Sensation: Intact to light touch x 4 extremities. Decreased vibration in the LE up to level of the knees.    Coordination: Finger-nose-finger was without dysmetria. No myoclonus.      Gait: deferred
S: No new complaints. s/p Tilt table test yesterday. No results available for review    Medications:  atorvastatin 40 milliGRAM(s) Oral at bedtime  QUEtiapine 25 milliGRAM(s) Oral at bedtime  famotidine    Tablet 20 milliGRAM(s) Oral two times a day  potassium chloride    Tablet ER 10 milliEquivalent(s) Oral daily  cholecalciferol 4000 Unit(s) Oral daily  heparin  Injectable 5000 Unit(s) SubCutaneous every 12 hours      Labs:  CBC Full  -  ( 28 Jun 2017 05:51 )  WBC Count : 8.0 K/uL  Hemoglobin : 16.6 g/dL  Hematocrit : 47.6 %  Platelet Count - Automated : 150 K/uL  Mean Cell Volume : 103.0 fl  Mean Cell Hemoglobin : 35.9 pg  Mean Cell Hemoglobin Concentration : 35.0 gm/dL    06-28    141  |  105  |  21  ----------------------------<  93  4.4   |  23  |  1.27    Ca    9.7      28 Jun 2017 05:51    TSH: 1.68  B12 (9/2016) 513  Folate (9/2016) 18.7  Paraneoplastic panel (9/2016): negative     Vitals:  Vital Signs Last 24 Hrs  T(C): 36.6 (29 Jun 2017 05:00), Max: 36.8 (28 Jun 2017 20:40)  T(F): 97.9 (29 Jun 2017 05:00), Max: 98.3 (28 Jun 2017 20:40)  HR: 69 (29 Jun 2017 05:00) (51 - 81)  BP: 127/89 (29 Jun 2017 05:00) (107/73 - 127/89)  BP(mean): --  RR: 18 (29 Jun 2017 05:00) (18 - 18)  SpO2: 95% (29 Jun 2017 05:00) (94% - 99%)    NEUROLOGICAL EXAM:    Mental status: Awake, alert, and in no apparent distress. Oriented to person, knows it is a hospital, [place], June 28th, Thursday, 2017. Language function is normal: fluent and repetition intact. Follows commands. No neglect. Attention is intact: able to name 7 days of week backwards, correctly.    Cranial Nerves: Extraocular movements were intact. Visual field were full to blink to threat bilaterally. Fundoscopic exam was deferred. Facial sensation was intact to light touch. There was no facial asymmetry. The palate was upgoing symmetrically and tongue was midline. Hearing acuity was intact to finger rub AU. Shoulder shrug was full bilaterally    Motor exam: Bulk and tone were normal. Strength was 5/5 in all four extremities, including the intrinsic muscles of the hands. No atrophy noted. Fine finger movements were symmetric and normal. There was no pronator drift. No postural or action tremor. Noted to have spontaneous fasciculations in the LE.     Reflexes: Absent throughout. Toes were downgoing on the left but upgoing on the right.     Sensation: Intact to light touch x 4 extremities. Decreased vibration in the LE up to level of the knees.    Coordination: Finger-nose-finger was without dysmetria. No myoclonus.      Gait: deferred
MEDICINE, PROGRESS NOTE [phone]    EVELIN CHAVEZ 83y [id number]    Patient seen and examined.  Patient is a 83y old  Male who presents with a chief complaint of sent in for bradycardia from pmd (26 Jun 2017 13:27)  Pt pleasantly confused and cooperative.    PAST MEDICAL & SURGICAL HISTORY:  Alzheimer disease: with psuedobulbular affect  BPH (benign prostatic hypertrophy)  Renal calculi  Hyperlipidemia  GERD (gastroesophageal reflux disease)  Nephrolithiasis  H/O cataract extraction, right  S/P tonsillectomy    MEDICATIONS  (STANDING):  atorvastatin 40 milliGRAM(s) Oral at bedtime  QUEtiapine 25 milliGRAM(s) Oral at bedtime  famotidine    Tablet 20 milliGRAM(s) Oral two times a day  potassium chloride    Tablet ER 10 milliEquivalent(s) Oral daily  cholecalciferol 4000 Unit(s) Oral daily  heparin  Injectable 5000 Unit(s) SubCutaneous every 12 hours  docusate sodium 100 milliGRAM(s) Oral two times a day  senna 2 Tablet(s) Oral at bedtime    MEDICATIONS  (PRN):    Allergies    IV dye (Rash)  penicillin (Rash)    Intolerances        PHYSICAL EXAM:  Constitutional: NAD  HEENT: Normocephalic, EOMI  Neck:  No JVD  Respiratory: CTA B/L, No wheezes  Cardiovascular: S1, S2, RRR, + systolic murmur  Gastrointestinal: BS+, soft, NT/ND  Extremities: No peripheral edema  Neurological: AAOX2, no focal deficits  Psychiatric: Normal mood, normal affect  : No Barone    Vital Signs Last 24 Hrs  T(C): 36.9 (30 Jun 2017 12:30), Max: 36.9 (29 Jun 2017 21:31)  T(F): 98.4 (30 Jun 2017 12:30), Max: 98.4 (29 Jun 2017 21:31)  HR: 78 (30 Jun 2017 12:30) (62 - 90)  BP: 109/73 (30 Jun 2017 12:30) (101/65 - 109/73)  BP(mean): --  RR: 18 (30 Jun 2017 12:30) (16 - 18)  SpO2: 93% (30 Jun 2017 12:30) (93% - 95%)  I&O's Summary    29 Jun 2017 07:01  -  30 Jun 2017 07:00  --------------------------------------------------------  IN: 1000 mL / OUT: 0 mL / NET: 1000 mL    30 Jun 2017 07:01  -  30 Jun 2017 14:19  --------------------------------------------------------  IN: 340 mL / OUT: 0 mL / NET: 340 mL        LABS:                    REVIEW OF SYSTEMS:      RADIOLOGY & ADDITIONAL STUDIES:      ASSESSMENT/PLAN:

## 2017-07-07 NOTE — PATIENT PROFILE ADULT. - FALL HARM RISK CONCLUSION
Subjective:      Shelby Hewitt is a 62 y.o. female who presents today for   Chief Complaint   Patient presents with    Hypertension     Follow up    Chest Congestion     Patient states she has a little chest congestion    Medication Evaluation     Patient would like to discuss increasing dosages on some of her medications   Hypertension- compliant with meds, denies side effects     Chronic back pain-sees pain management. She would like to increase neurontin  To 3 x daily     Depression-  Taking Zoloft 50 mg 1 tab po daily, tolerating well. She reports some reduction in libido and feels tired all of the time. She would like to consider a change in medication     Tobacco abuse counseling-patient still smoking She would like to quit. However states smoking cigarettes helps her deal with stress. URI- requests zpack   proventil    Obesity has been working on weight loss she feels she has lost weight although shows an increase since her last visit    She has a flare of eczema on her hands . She would like a refill of triamcinilone          Patient Active Problem List    Diagnosis Date Noted    Spinal stenosis of lumbar region with neurogenic claudication 06/18/2015    Disc herniation 06/18/2015    Chronic back pain 09/17/2012    Joint pain 09/17/2012    Joint stiffness 09/17/2012    HTN (hypertension) 09/17/2012    Arthritis 09/17/2012     Current Outpatient Prescriptions   Medication Sig Dispense Refill    losartan-hydroCHLOROthiazide (HYZAAR) 100-25 mg per tablet Take 1 Tab by mouth daily. 90 Tab 1    sertraline (ZOLOFT) 50 mg tablet Take 1 Tab by mouth daily. 30 Tab 6    gabapentin (NEURONTIN) 600 mg tablet Take 1 Tab by mouth two (2) times a day. 60 Tab 6    triamcinolone acetonide (KENALOG) 0.025 % topical cream Apply  to affected area two (2) times a day. use thin layer 30 g 1    cyclobenzaprine (FLEXERIL) 10 mg tablet Take 1 Tab by mouth three (3) times daily as needed for Muscle Spasm(s).  27 Tab 1    amLODIPine (NORVASC) 10 mg tablet Take 1 Tab by mouth daily. 90 Tab 1    oxyCODONE IR (OXY-IR) 15 mg immediate release tablet       guaiFENesin-codeine (GUAIFENESIN AC) 100-10 mg/5 mL solution Take 5 mL by mouth three (3) times daily as needed for Cough. Max Daily Amount: 15 mL. 236 mL 0    albuterol (PROVENTIL HFA, VENTOLIN HFA, PROAIR HFA) 90 mcg/actuation inhaler Take 1 Puff by inhalation every four (4) hours as needed for Wheezing. 1 Inhaler 1    azithromycin (ZITHROMAX) 250 mg tablet Take 2 tabs po x day 1 then 1 tab po daily days 2-5 6 Tab 0     Allergies   Allergen Reactions    Other Medication Other (comments)     Pt stated it caused her stomach to hurt when she took the PO dilaudid but has been given it IV with no reaction. Past Medical History:   Diagnosis Date    Chronic pain     low back pain    Constipation     Depression     Fibromyalgia     Hypertension     Osteoarthritis     Other ill-defined conditions     chronic kidney infections    Second hand smoke exposure     Sickle cell trait ([facility] [address]     pt reports    Spinal stenosis     L5-S1 left moderate to severe     Past Surgical History:   Procedure Laterality Date    HX  SECTION      HX ORTHOPAEDIC      R wrist surgery and foot surgery    HX TUBAL LIGATION       Family History   Problem Relation Age of Onset    Hypertension Mother     Sickle Cell Trait Mother     Diabetes Mother     Stroke Mother      SEVERAL TIAS    Hypertension Father     Heart Attack Father     Heart Disease Father     Hypertension Other      sibling    Anesth Problems Neg Hx      Social History   Substance Use Topics    Smoking status: Current Every Day Smoker     Packs/day: 0.30     Years: 30.00     Types: Cigarettes    Smokeless tobacco: Never Used    Alcohol use No        Review of Systems    A comprehensive review of systems was negative except for that written in the HPI.      Objective:     Visit Vitals    BP 98/68 (BP 1 Location: Left arm, BP Patient Position: Sitting)    Pulse 65    Temp 97.9 °F (36.6 °C) (Oral)    Resp 18    Ht 5' 6\" (1.676 m)    Wt 173 lb 14.4 oz (78.9 kg)    SpO2 94%    BMI 28.07 kg/m2     General:  Alert, cooperative, no distress, appears stated age. Head:  Normocephalic, without obvious abnormality, atraumatic. Eyes:  Conjunctivae/corneas clear. PERRL, EOMs intact. Fundi benign. Ears:  Normal TMs and external ear canals both ears. Nose: Nares normal. Septum midline. Mucosa normal. No drainage or sinus tenderness. Throat: Lips, mucosa, and tongue normal. Teeth and gums normal.   Neck: Supple, symmetrical, trachea midline, no adenopathy, thyroid: no enlargement/tenderness/nodules, no carotid bruit and no JVD. Back:   Symmetric, no curvature. ROM normal. No CVA tenderness. Lungs:   Clear to auscultation bilaterally. Chest wall:  No tenderness or deformity. Heart:  Regular rate and rhythm, S1, S2 normal, no murmur, click, rub or gallop. Abdomen:   Soft, non-tender. Bowel sounds normal. No masses,  No organomegaly. Extremities: Extremities normal, atraumatic, no cyanosis or edema. Pulses: 2+ and symmetric all extremities. Skin: Skin color, texture, turgor normal. No rashes or lesions. Lymph nodes: Cervical, supraclavicular, and axillary nodes normal.   Neurologic: CNII-XII intact. Normal strength, sensation and reflexes throughout. Assessment/Plan:       ICD-10-CM ICD-9-CM    1. Chronic back pain, unspecified back location, unspecified back pain laterality M54.9 724.5 Sees Pain Management    G89.29 338.29    2. Essential hypertension Z03 546.2 METABOLIC PANEL, COMPREHENSIVE   3. Tobacco abuse Z72.0 305.1 Tobacco cessation    4. Viral upper respiratory tract infection J06.9 465.9 Refill ZPACK and Proventil    B97.89     5. Obesity due to excess calories, unspecified obesity severity E66.09 278.00 Discussed diet and weight lss   6.  Depression, unspecified depression type F32.9 311    7. Eczema, unspecified type L30.9 692.9 Refill triamcinolone cream   8. Hyperlipidemia, unspecified hyperlipidemia type E78.5 272.4 LIPID PANEL       Follow-up Disposition: Not on File   Advised her to call back or return to office if symptoms worsen/change/persist.  Discussed expected course/resolution/complications of diagnosis in detail with patient. Medication risks/benefits/costs/interactions/alternatives discussed with patient. She was given an after visit summary which includes diagnoses, current medications, & vitals. She expressed understanding with the diagnosis and plan. Fall with Harm Risk

## 2017-07-20 ENCOUNTER — OUTPATIENT (OUTPATIENT)
Dept: OUTPATIENT SERVICES | Facility: HOSPITAL | Age: 82
LOS: 1 days | End: 2017-07-20

## 2017-07-20 DIAGNOSIS — N20.0 CALCULUS OF KIDNEY: Chronic | ICD-10-CM

## 2017-07-20 DIAGNOSIS — Z98.89 OTHER SPECIFIED POSTPROCEDURAL STATES: Chronic | ICD-10-CM

## 2017-07-21 DIAGNOSIS — Z01.20 ENCOUNTER FOR DENTAL EXAMINATION AND CLEANING WITHOUT ABNORMAL FINDINGS: ICD-10-CM

## 2017-09-19 ENCOUNTER — INPATIENT (INPATIENT)
Facility: HOSPITAL | Age: 82
LOS: 9 days | Discharge: NURSING, REHAB OR LONG-TERM CARE | DRG: 56 | End: 2017-09-29
Attending: INTERNAL MEDICINE | Admitting: INTERNAL MEDICINE
Payer: MEDICARE

## 2017-09-19 VITALS
HEART RATE: 50 BPM | SYSTOLIC BLOOD PRESSURE: 128 MMHG | RESPIRATION RATE: 20 BRPM | OXYGEN SATURATION: 99 % | DIASTOLIC BLOOD PRESSURE: 82 MMHG | TEMPERATURE: 99 F

## 2017-09-19 DIAGNOSIS — Z98.89 OTHER SPECIFIED POSTPROCEDURAL STATES: Chronic | ICD-10-CM

## 2017-09-19 DIAGNOSIS — R29.6 REPEATED FALLS: ICD-10-CM

## 2017-09-19 DIAGNOSIS — N20.0 CALCULUS OF KIDNEY: Chronic | ICD-10-CM

## 2017-09-19 LAB
ALBUMIN SERPL ELPH-MCNC: 4.4 G/DL — SIGNIFICANT CHANGE UP (ref 3.3–5)
ALP SERPL-CCNC: 89 U/L — SIGNIFICANT CHANGE UP (ref 40–120)
ALT FLD-CCNC: 45 U/L RC — SIGNIFICANT CHANGE UP (ref 10–45)
ANION GAP SERPL CALC-SCNC: 14 MMOL/L — SIGNIFICANT CHANGE UP (ref 5–17)
APTT BLD: 33.1 SEC — SIGNIFICANT CHANGE UP (ref 27.5–37.4)
AST SERPL-CCNC: 29 U/L — SIGNIFICANT CHANGE UP (ref 10–40)
BASE EXCESS BLDV CALC-SCNC: 3.6 MMOL/L — HIGH (ref -2–2)
BASOPHILS # BLD AUTO: 0 K/UL — SIGNIFICANT CHANGE UP (ref 0–0.2)
BASOPHILS NFR BLD AUTO: 0.6 % — SIGNIFICANT CHANGE UP (ref 0–2)
BILIRUB SERPL-MCNC: 0.9 MG/DL — SIGNIFICANT CHANGE UP (ref 0.2–1.2)
BUN SERPL-MCNC: 19 MG/DL — SIGNIFICANT CHANGE UP (ref 7–23)
CA-I SERPL-SCNC: 1.2 MMOL/L — SIGNIFICANT CHANGE UP (ref 1.12–1.3)
CALCIUM SERPL-MCNC: 9.5 MG/DL — SIGNIFICANT CHANGE UP (ref 8.4–10.5)
CHLORIDE BLDV-SCNC: 108 MMOL/L — SIGNIFICANT CHANGE UP (ref 96–108)
CHLORIDE SERPL-SCNC: 103 MMOL/L — SIGNIFICANT CHANGE UP (ref 96–108)
CK SERPL-CCNC: 85 U/L — SIGNIFICANT CHANGE UP (ref 30–200)
CO2 BLDV-SCNC: 31 MMOL/L — HIGH (ref 22–30)
CO2 SERPL-SCNC: 28 MMOL/L — SIGNIFICANT CHANGE UP (ref 22–31)
CREAT SERPL-MCNC: 1.23 MG/DL — SIGNIFICANT CHANGE UP (ref 0.5–1.3)
EOSINOPHIL # BLD AUTO: 0 K/UL — SIGNIFICANT CHANGE UP (ref 0–0.5)
EOSINOPHIL NFR BLD AUTO: 0.4 % — SIGNIFICANT CHANGE UP (ref 0–6)
GAS PNL BLDV: 140 MMOL/L — SIGNIFICANT CHANGE UP (ref 136–145)
GAS PNL BLDV: SIGNIFICANT CHANGE UP
GAS PNL BLDV: SIGNIFICANT CHANGE UP
GLUCOSE BLDV-MCNC: 99 MG/DL — SIGNIFICANT CHANGE UP (ref 70–99)
GLUCOSE SERPL-MCNC: 97 MG/DL — SIGNIFICANT CHANGE UP (ref 70–99)
HCO3 BLDV-SCNC: 30 MMOL/L — HIGH (ref 21–29)
HCT VFR BLD CALC: 43.3 % — SIGNIFICANT CHANGE UP (ref 39–50)
HCT VFR BLDA CALC: 46 % — SIGNIFICANT CHANGE UP (ref 39–50)
HGB BLD CALC-MCNC: 14.9 G/DL — SIGNIFICANT CHANGE UP (ref 13–17)
HGB BLD-MCNC: 14.9 G/DL — SIGNIFICANT CHANGE UP (ref 13–17)
HOROWITZ INDEX BLDV+IHG-RTO: SIGNIFICANT CHANGE UP
INR BLD: 1.13 RATIO — SIGNIFICANT CHANGE UP (ref 0.88–1.16)
LACTATE BLDV-MCNC: 1 MMOL/L — SIGNIFICANT CHANGE UP (ref 0.7–2)
LYMPHOCYTES # BLD AUTO: 1.8 K/UL — SIGNIFICANT CHANGE UP (ref 1–3.3)
LYMPHOCYTES # BLD AUTO: 24.7 % — SIGNIFICANT CHANGE UP (ref 13–44)
MCHC RBC-ENTMCNC: 34.3 GM/DL — SIGNIFICANT CHANGE UP (ref 32–36)
MCHC RBC-ENTMCNC: 36 PG — HIGH (ref 27–34)
MCV RBC AUTO: 105 FL — HIGH (ref 80–100)
MONOCYTES # BLD AUTO: 0.8 K/UL — SIGNIFICANT CHANGE UP (ref 0–0.9)
MONOCYTES NFR BLD AUTO: 10.2 % — SIGNIFICANT CHANGE UP (ref 2–14)
NEUTROPHILS # BLD AUTO: 4.8 K/UL — SIGNIFICANT CHANGE UP (ref 1.8–7.4)
NEUTROPHILS NFR BLD AUTO: 64.2 % — SIGNIFICANT CHANGE UP (ref 43–77)
PCO2 BLDV: 52 MMHG — HIGH (ref 35–50)
PH BLDV: 7.37 — SIGNIFICANT CHANGE UP (ref 7.35–7.45)
PLATELET # BLD AUTO: 154 K/UL — SIGNIFICANT CHANGE UP (ref 150–400)
PO2 BLDV: 24 MMHG — LOW (ref 25–45)
POTASSIUM BLDV-SCNC: 3.8 MMOL/L — SIGNIFICANT CHANGE UP (ref 3.5–5)
POTASSIUM SERPL-MCNC: 4.1 MMOL/L — SIGNIFICANT CHANGE UP (ref 3.5–5.3)
POTASSIUM SERPL-SCNC: 4.1 MMOL/L — SIGNIFICANT CHANGE UP (ref 3.5–5.3)
PROT SERPL-MCNC: 7.3 G/DL — SIGNIFICANT CHANGE UP (ref 6–8.3)
PROTHROM AB SERPL-ACNC: 12.3 SEC — SIGNIFICANT CHANGE UP (ref 9.8–12.7)
RBC # BLD: 4.13 M/UL — LOW (ref 4.2–5.8)
RBC # FLD: 12.2 % — SIGNIFICANT CHANGE UP (ref 10.3–14.5)
SAO2 % BLDV: 33 % — LOW (ref 67–88)
SODIUM SERPL-SCNC: 145 MMOL/L — SIGNIFICANT CHANGE UP (ref 135–145)
TROPONIN T SERPL-MCNC: <0.01 NG/ML — SIGNIFICANT CHANGE UP (ref 0–0.06)
WBC # BLD: 7.4 K/UL — SIGNIFICANT CHANGE UP (ref 3.8–10.5)
WBC # FLD AUTO: 7.4 K/UL — SIGNIFICANT CHANGE UP (ref 3.8–10.5)

## 2017-09-19 PROCEDURE — 99285 EMERGENCY DEPT VISIT HI MDM: CPT | Mod: 25,GC

## 2017-09-19 PROCEDURE — 93010 ELECTROCARDIOGRAM REPORT: CPT

## 2017-09-19 PROCEDURE — 70450 CT HEAD/BRAIN W/O DYE: CPT | Mod: 26

## 2017-09-19 PROCEDURE — 71010: CPT | Mod: 26

## 2017-09-19 RX ORDER — SODIUM CHLORIDE 9 MG/ML
3 INJECTION INTRAMUSCULAR; INTRAVENOUS; SUBCUTANEOUS ONCE
Qty: 0 | Refills: 0 | Status: COMPLETED | OUTPATIENT
Start: 2017-09-19 | End: 2017-09-19

## 2017-09-19 RX ORDER — ASPIRIN/CALCIUM CARB/MAGNESIUM 324 MG
81 TABLET ORAL DAILY
Qty: 0 | Refills: 0 | Status: DISCONTINUED | OUTPATIENT
Start: 2017-09-19 | End: 2017-09-29

## 2017-09-19 RX ORDER — EZETIMIBE 10 MG/1
1 TABLET ORAL
Qty: 0 | Refills: 0 | COMMUNITY

## 2017-09-19 RX ORDER — MIDODRINE HYDROCHLORIDE 2.5 MG/1
5 TABLET ORAL
Qty: 0 | Refills: 0 | Status: DISCONTINUED | OUTPATIENT
Start: 2017-09-19 | End: 2017-09-29

## 2017-09-19 RX ORDER — ATORVASTATIN CALCIUM 80 MG/1
80 TABLET, FILM COATED ORAL
Qty: 0 | Refills: 0 | Status: DISCONTINUED | OUTPATIENT
Start: 2017-09-19 | End: 2017-09-21

## 2017-09-19 RX ADMIN — ATORVASTATIN CALCIUM 80 MILLIGRAM(S): 80 TABLET, FILM COATED ORAL at 21:45

## 2017-09-19 RX ADMIN — SODIUM CHLORIDE 3 MILLILITER(S): 9 INJECTION INTRAMUSCULAR; INTRAVENOUS; SUBCUTANEOUS at 12:56

## 2017-09-19 RX ADMIN — MIDODRINE HYDROCHLORIDE 5 MILLIGRAM(S): 2.5 TABLET ORAL at 21:45

## 2017-09-19 NOTE — ED PROVIDER NOTE - ATTENDING CONTRIBUTION TO CARE
Dr. Sy (Attending Physician)  I performed a history and physical exam of the patient and discussed their management with the resident. I reviewed the resident's note and agree with the documented findings and plan of care. My medical decision making and observations are found above.

## 2017-09-19 NOTE — H&P ADULT - ASSESSMENT
84 y/o male with history as listed presented for evaluation of multiple falls at home.    continue home meds  fall precautions  pt eval  neuro eval  no need for telemetry, pt with know sinus bradycardia and loop recorder.  discussed with pt and wife at bedside.

## 2017-09-19 NOTE — H&P ADULT - HISTORY OF PRESENT ILLNESS
83 year old, presenting with frequent falls (3x this week), evaluated today at cardiologist and sent in for concern for bradycardia. States that he remembers falling and states that he feels like his feet are glued to the floor. not on blood thinners.  Most recent fall was this past weekend but denies head injury

## 2017-09-19 NOTE — ED PROVIDER NOTE - PHYSICAL EXAMINATION
Ludivina: A & O x 3, NAD, HEENT WNL and no facial asymmetry; lungs CTAB, heart with reg rhythm; abdomen soft NTND; extremities with no edema; skin facial seborrheic dermatitis, neuro exam non focal with no motor or sensory deficits

## 2017-09-19 NOTE — ED PROVIDER NOTE - OBJECTIVE STATEMENT
83 year old, presenting with frequent falls (3x this week), evaluated today at cardiologist and sent in for concern for bradycardia. States that he remembers falling and states that he feels like his feet are glued to the floor. not on blood thinners. Was recently placed on a medication to raise his pressure but family and patient doesn't remember the name of this drug. Most recent fall was this past weekend but denies head injury (elbow).     PMD: Osvaldo

## 2017-09-19 NOTE — ED ADULT NURSE REASSESSMENT NOTE - NS ED NURSE REASSESS COMMENT FT1
pt pending tele bed assignment remains alert given lunch tray tolerates well remains sinus bradycardia rate int the 50's no chest pain wife at bedside

## 2017-09-19 NOTE — ED ADULT NURSE NOTE - OBJECTIVE STATEMENT
83yyo male sent from dr Goodson office for weakness pt frequent falls last one 3 days ago pt does not have any physical complaints except legs feel "glued" to the floor when he goes to walk pt denies any chest pain skin warm dry 83yyo male sent from dr Goodson office for weakness pt frequent falls last one 3 days ago pt does not have any physical complaints except legs feel "glued" to the floor when he goes to walk pt denies any chest pain skin warm dry motor sensory intact with weakness sinus bradycardia on moniter no swelling to lower extremities pt denies cough fever or chills

## 2017-09-19 NOTE — H&P ADULT - NSHPREVIEWOFSYSTEMS_GEN_ALL_CORE
no ha or dizziness  no sob or cough  no cp or  palpitations  no n/v/d/c  no dysuria or hematuria  no heat intol or cold intol  no fevers/rigors/chills

## 2017-09-19 NOTE — H&P ADULT - NSHPLABSRESULTS_GEN_ALL_CORE
14.9   7.4   )-----------( 154      ( 19 Sep 2017 12:22 )             43.3   09-19    145  |  103  |  19  ----------------------------<  97  4.1   |  28  |  1.23    Ca    9.5      19 Sep 2017 12:22    TPro  7.3  /  Alb  4.4  /  TBili  0.9  /  DBili  x   /  AST  29  /  ALT  45  /  AlkPhos  89  09-19    PT/INR - ( 19 Sep 2017 12:22 )   PT: 12.3 sec;   INR: 1.13 ratio         PTT - ( 19 Sep 2017 12:22 )  PTT:33.1 sec  tropo <0.01  < from: CT Head No Cont (09.19.17 @ 13:48) >    EXAM:  CT BRAIN                            PROCEDURE DATE:  09/19/2017            INTERPRETATION:  .    CLINICAL INFORMATION: Frequent falls.    TECHNIQUE: Multiple axial CT images of the head were obtained without   contrast. Sagittal and coronal reconstructed images were acquired from   the source data.    COMPARISON: Comparison is made to a prior CT examination of the head from   6/29/2016.    FINDINGS: There is no acute intracranial hemorrhage, mass effect, midline   shift, herniation, extra-axial fluid collection, or hydrocephalus.    There is diffuse cerebral volume loss with prominence of the sulci,   fissures, and cisternal spaces which is normal for the patient's age.   Ventriculomegaly is again notable out of proportion to sulcal prominence.   There is also variable sulcal prominence. There is mild periventricular   white matter hypoattenuation statistically compatible with microvascular   changes given calcific atherosclerotic disease of the intracranial   arteries.    The paranasal sinuses and mastoid air cells are clear. The calvarium is   intact. There is evidence of bilateral cataract removal.    IMPRESSION: No acute intracranial hemorrhage, mass effect, or midline   shift.    Imaging findings for which normal pressure hydrocephalus can be   considered. Finding appears unchanged from the prior CT examination.   Clinical correlation is required.    Similar-appearing mild microvascular disease.  FARIHA MOHAMUD M.D., ATTENDING RADIOLOGIST  This document has been electronically signed. Sep 19 2017  2:09PM     < from: Xray Chest 1 View AP/PA (09.19.17 @ 12:28) >  EXAM:  CHEST SINGLE AP OR PA                        PROCEDURE DATE:  09/19/2017    INTERPRETATION:  A single chest x-ray was obtained on 9/19/2017.  Indication: Chest pain.  Impression:  The heart is normal in size. The lungs are clear. Mild degenerative   changes of the spine is evident.  DIONISIO SKINNER M.D., ATTENDING RADIOLOGIST  This document has been electronically signed. Sep 19 2017  1:46PM  < end of copied text >

## 2017-09-19 NOTE — ED PROVIDER NOTE - MEDICAL DECISION MAKING DETAILS
Dr. Sy (Attending Physician)  Pt. with ho dementia pw multiple frequent fall had 3 falls this week has loop recorder in place. Lungs clear, heart daniel. Sent in for admission by Dr. Riley. Will send cardiac enzymes and admit to Dr. Tafoya on telemetry.  Call Dr. Riley for loop recorder findings.  ECG with sinus daniel cardia no ST changes.

## 2017-09-19 NOTE — H&P ADULT - NSHPPHYSICALEXAM_GEN_ALL_CORE
AAOX2-3, EOMI, normocephalic  delayed speech - baseline  cta b/l  s1s2, daniel  soft, nt, nd, bs+  mild edema le b/l  pp+  + arom and prom x 4 extrem vs 98.1F, 68,125/80, 93%ra  AAOX2-3, EOMI, normocephalic  delayed speech - baseline  cta b/l  s1s2, daniel  soft, nt, nd, bs+  mild edema le b/l  pp+  + arom and prom x 4 extrem

## 2017-09-19 NOTE — ED PROVIDER NOTE - CARE PLAN
Principal Discharge DX:	Frequent falls Principal Discharge DX:	Frequent falls  Secondary Diagnosis:	Bradycardia

## 2017-09-19 NOTE — ED PROVIDER NOTE - NS ED ROS FT
CONST: no fevers, no chills  EYES: no pain  ENT: no sore throat   CV: no chest pain  RESP: no shortness of breath  ABD: no abdominal pain   : no dysuria  MSK: no new pain from the falls   NEURO: no headache or additional neurologic complaints  HEME: not on blood thinners  SKIN:  no rash

## 2017-09-20 LAB
ANION GAP SERPL CALC-SCNC: 14 MMOL/L — SIGNIFICANT CHANGE UP (ref 5–17)
BUN SERPL-MCNC: 14 MG/DL — SIGNIFICANT CHANGE UP (ref 7–23)
CALCIUM SERPL-MCNC: 9.4 MG/DL — SIGNIFICANT CHANGE UP (ref 8.4–10.5)
CHLORIDE SERPL-SCNC: 103 MMOL/L — SIGNIFICANT CHANGE UP (ref 96–108)
CO2 SERPL-SCNC: 24 MMOL/L — SIGNIFICANT CHANGE UP (ref 22–31)
CREAT SERPL-MCNC: 1.08 MG/DL — SIGNIFICANT CHANGE UP (ref 0.5–1.3)
GLUCOSE SERPL-MCNC: 101 MG/DL — HIGH (ref 70–99)
HCT VFR BLD CALC: 43 % — SIGNIFICANT CHANGE UP (ref 39–50)
HGB BLD-MCNC: 14.4 G/DL — SIGNIFICANT CHANGE UP (ref 13–17)
MCHC RBC-ENTMCNC: 33.1 PG — SIGNIFICANT CHANGE UP (ref 27–34)
MCHC RBC-ENTMCNC: 33.5 GM/DL — SIGNIFICANT CHANGE UP (ref 32–36)
MCV RBC AUTO: 98.9 FL — SIGNIFICANT CHANGE UP (ref 80–100)
PLATELET # BLD AUTO: 190 K/UL — SIGNIFICANT CHANGE UP (ref 150–400)
POTASSIUM SERPL-MCNC: 5.2 MMOL/L — SIGNIFICANT CHANGE UP (ref 3.5–5.3)
POTASSIUM SERPL-SCNC: 5.2 MMOL/L — SIGNIFICANT CHANGE UP (ref 3.5–5.3)
RBC # BLD: 4.35 M/UL — SIGNIFICANT CHANGE UP (ref 4.2–5.8)
RBC # FLD: 13.8 % — SIGNIFICANT CHANGE UP (ref 10.3–14.5)
SODIUM SERPL-SCNC: 141 MMOL/L — SIGNIFICANT CHANGE UP (ref 135–145)
WBC # BLD: 9.51 K/UL — SIGNIFICANT CHANGE UP (ref 3.8–10.5)
WBC # FLD AUTO: 9.51 K/UL — SIGNIFICANT CHANGE UP (ref 3.8–10.5)

## 2017-09-20 RX ORDER — FAMOTIDINE 10 MG/ML
20 INJECTION INTRAVENOUS
Qty: 0 | Refills: 0 | Status: DISCONTINUED | OUTPATIENT
Start: 2017-09-20 | End: 2017-09-29

## 2017-09-20 RX ADMIN — MIDODRINE HYDROCHLORIDE 5 MILLIGRAM(S): 2.5 TABLET ORAL at 05:40

## 2017-09-20 RX ADMIN — FAMOTIDINE 20 MILLIGRAM(S): 10 INJECTION INTRAVENOUS at 17:31

## 2017-09-20 RX ADMIN — MIDODRINE HYDROCHLORIDE 5 MILLIGRAM(S): 2.5 TABLET ORAL at 17:31

## 2017-09-20 RX ADMIN — Medication 81 MILLIGRAM(S): at 13:07

## 2017-09-20 RX ADMIN — FAMOTIDINE 20 MILLIGRAM(S): 10 INJECTION INTRAVENOUS at 03:01

## 2017-09-20 NOTE — PROGRESS NOTE ADULT - SUBJECTIVE AND OBJECTIVE BOX
MEDICINE, PROGRESS NOTE [phone]    EVELIN CHAVEZ 83y [id number]    Patient seen and examined.  Patient is a 83y old  Male who presents with a chief complaint of Multiple falls at home (19 Sep 2017 19:29)  Pt feels ok.      PAST MEDICAL & SURGICAL HISTORY:  Alzheimer disease: with psuedobulbular affect  BPH (benign prostatic hypertrophy)  Renal calculi  Hyperlipidemia  GERD (gastroesophageal reflux disease)  Nephrolithiasis  H/O cataract extraction, right  S/P tonsillectomy    MEDICATIONS  (STANDING):  aspirin enteric coated 81 milliGRAM(s) Oral daily  Nuedexta 20/10mg 1 Capsule(s) 1 Capsule(s) Oral every 12 hours  atorvastatin 80 milliGRAM(s) Oral every 7 days  midodrine 5 milliGRAM(s) Oral two times a day  famotidine    Tablet 20 milliGRAM(s) Oral two times a day    MEDICATIONS  (PRN):    Allergies    IV dye (Rash)  penicillin (Rash)    Intolerances        PHYSICAL EXAM:  Constitutional: NAD  HEENT: Normocephalic, EOMI  Neck:  No JVD  Respiratory: CTA B/L, No wheezes  Cardiovascular: S1, S2, RRR, + systolic murmur  Gastrointestinal: BS+, soft, NT/ND  Extremities: No peripheral edema  Neurological: AAOX3, no focal deficits  Psychiatric: Normal mood, normal affect  : No Barone    Vital Signs Last 24 Hrs  T(C): 36.7 (20 Sep 2017 12:10), Max: 36.9 (19 Sep 2017 15:03)  T(F): 98.1 (20 Sep 2017 12:10), Max: 98.5 (20 Sep 2017 04:56)  HR: 79 (20 Sep 2017 12:10) (48 - 79)  BP: 126/88 (20 Sep 2017 12:10) (114/73 - 139/87)  BP(mean): --  RR: 18 (20 Sep 2017 12:10) (16 - 18)  SpO2: 95% (20 Sep 2017 12:10) (93% - 97%)  I&O's Summary    19 Sep 2017 07:01  -  20 Sep 2017 07:00  --------------------------------------------------------  IN: 220 mL / OUT: 700 mL / NET: -480 mL    20 Sep 2017 07:01  -  20 Sep 2017 14:28  --------------------------------------------------------  IN: 480 mL / OUT: 275 mL / NET: 205 mL        LABS:                        14.4   9.51  )-----------( 190      ( 20 Sep 2017 07:34 )             43.0     09-20    141  |  103  |  14  ----------------------------<  101<H>  5.2   |  24  |  1.08    Ca    9.4      20 Sep 2017 07:34    TPro  7.3  /  Alb  4.4  /  TBili  0.9  /  DBili  x   /  AST  29  /  ALT  45  /  AlkPhos  89  09-19    CARDIAC MARKERS ( 19 Sep 2017 12:22 )  x     / <0.01 ng/mL / 85 U/L / x     / x                REVIEW OF SYSTEMS:      RADIOLOGY & ADDITIONAL STUDIES:      ASSESSMENT/PLAN:

## 2017-09-21 LAB
ANION GAP SERPL CALC-SCNC: 15 MMOL/L — SIGNIFICANT CHANGE UP (ref 5–17)
BUN SERPL-MCNC: 14 MG/DL — SIGNIFICANT CHANGE UP (ref 7–23)
CALCIUM SERPL-MCNC: 9.6 MG/DL — SIGNIFICANT CHANGE UP (ref 8.4–10.5)
CHLORIDE SERPL-SCNC: 104 MMOL/L — SIGNIFICANT CHANGE UP (ref 96–108)
CO2 SERPL-SCNC: 23 MMOL/L — SIGNIFICANT CHANGE UP (ref 22–31)
CREAT SERPL-MCNC: 1.12 MG/DL — SIGNIFICANT CHANGE UP (ref 0.5–1.3)
GLUCOSE SERPL-MCNC: 95 MG/DL — SIGNIFICANT CHANGE UP (ref 70–99)
HCT VFR BLD CALC: 41.9 % — SIGNIFICANT CHANGE UP (ref 39–50)
HGB BLD-MCNC: 14.2 G/DL — SIGNIFICANT CHANGE UP (ref 13–17)
MCHC RBC-ENTMCNC: 33.6 PG — SIGNIFICANT CHANGE UP (ref 27–34)
MCHC RBC-ENTMCNC: 33.9 GM/DL — SIGNIFICANT CHANGE UP (ref 32–36)
MCV RBC AUTO: 99.1 FL — SIGNIFICANT CHANGE UP (ref 80–100)
PLATELET # BLD AUTO: 175 K/UL — SIGNIFICANT CHANGE UP (ref 150–400)
POTASSIUM SERPL-MCNC: 4.1 MMOL/L — SIGNIFICANT CHANGE UP (ref 3.5–5.3)
POTASSIUM SERPL-SCNC: 4.1 MMOL/L — SIGNIFICANT CHANGE UP (ref 3.5–5.3)
RBC # BLD: 4.23 M/UL — SIGNIFICANT CHANGE UP (ref 4.2–5.8)
RBC # FLD: 14 % — SIGNIFICANT CHANGE UP (ref 10.3–14.5)
SODIUM SERPL-SCNC: 142 MMOL/L — SIGNIFICANT CHANGE UP (ref 135–145)
WBC # BLD: 7.52 K/UL — SIGNIFICANT CHANGE UP (ref 3.8–10.5)
WBC # FLD AUTO: 7.52 K/UL — SIGNIFICANT CHANGE UP (ref 3.8–10.5)

## 2017-09-21 RX ORDER — EZETIMIBE 10 MG/1
10 TABLET ORAL DAILY
Qty: 0 | Refills: 0 | Status: DISCONTINUED | OUTPATIENT
Start: 2017-09-21 | End: 2017-09-29

## 2017-09-21 RX ORDER — HEPARIN SODIUM 5000 [USP'U]/ML
5000 INJECTION INTRAVENOUS; SUBCUTANEOUS EVERY 12 HOURS
Qty: 0 | Refills: 0 | Status: DISCONTINUED | OUTPATIENT
Start: 2017-09-21 | End: 2017-09-29

## 2017-09-21 RX ADMIN — MIDODRINE HYDROCHLORIDE 5 MILLIGRAM(S): 2.5 TABLET ORAL at 06:05

## 2017-09-21 RX ADMIN — FAMOTIDINE 20 MILLIGRAM(S): 10 INJECTION INTRAVENOUS at 17:35

## 2017-09-21 RX ADMIN — MIDODRINE HYDROCHLORIDE 5 MILLIGRAM(S): 2.5 TABLET ORAL at 17:35

## 2017-09-21 RX ADMIN — Medication 81 MILLIGRAM(S): at 11:00

## 2017-09-21 RX ADMIN — FAMOTIDINE 20 MILLIGRAM(S): 10 INJECTION INTRAVENOUS at 06:05

## 2017-09-21 RX ADMIN — HEPARIN SODIUM 5000 UNIT(S): 5000 INJECTION INTRAVENOUS; SUBCUTANEOUS at 17:35

## 2017-09-21 NOTE — CONSULT NOTE ADULT - SUBJECTIVE AND OBJECTIVE BOX
Admitting Diagnosis:  Repeated falls (R29.6): REPEATED FALLS      HPI:  This is a 83y year old Male with the below past medical history significant for neuropathy and frequent falls, followed in the office by Dr. Campos, last seen in July, referred by cardiologist for finding of bradycardia. Most recent fall this past weekend without head trauma. No neck or back pain at this time. Describes difficulty moving his feet. No headaches, vision changes, dysarthria or dysphagia. He also has a known history of AD type dementia with pseudobulbar affect managed on nudexta.     As per notes from my office, patient presented to see Dr. Campos following hospitalization in June 2017 with fall and bradycardia. EMG in hospital noted active denervation changes and suggested polyneuropathy. Dr. Campos referred him for repeat EMG to explain the active denervation changes which would be more consistent with lumbar radiculopathy. One week prior to his August EMG he suffered a fall with head trauma. On evaluation by Dr. Rosenstein in the office he was noted to have upgoing plantar response, decreased recruitment on the right suggestive of upper motor neuron dysfunction. EMG revealed a chronic axonal sensorimotor polyneuropathy without active denervation changes. He was referred for MRI of the brain which demonstrated an acute high left parietal, cortical, infarct. Dr. Rosenstein contacted Dr. Riley who reviewed loop recorder data and noted no evidence of atrial fibrillation. He was advised to continue aspirin for secondary stroke prophylaxis. Additionally MRI of the brain noted chronic cerebellar and left basal ganglia infarcts. he was advised to only ambulate with the assistance of a cane.     Past Medical History:  Alzheimer disease (G30.9): with psuedobulbular affect  BPH (benign prostatic hypertrophy) (600.00)  Renal calculi (592.0)  Urinary tract infection (599.0): currently on cipro  Hyperlipidemia (272.4)  GERD (gastroesophageal reflux disease) (530.81)  Polyneuropathy      Past Surgical History:  Nephrolithiasis (N20.0)  H/O cataract extraction, right (Z98.89)  S/P tonsillectomy (V45.89)      Social History:  No toxic habits    Family History:  FAMILY HISTORY:  No pertinent family history in first degree relatives      Allergies:  IV dye (Rash)  penicillin (Rash)      ROS:  Patient unable to provide.     Advanced care planning reviewed and noted in the chart.    Medications:  aspirin enteric coated 81 milliGRAM(s) Oral daily  Nuedexta 20/10mg 1 Capsule(s) 1 Capsule(s) Oral every 12 hours  atorvastatin 80 milliGRAM(s) Oral every 7 days  midodrine 5 milliGRAM(s) Oral two times a day  famotidine    Tablet 20 milliGRAM(s) Oral two times a day  heparin  Injectable 5000 Unit(s) SubCutaneous every 12 hours      Labs:  CBC Full  -  ( 21 Sep 2017 08:16 )  WBC Count : 7.52 K/uL  Hemoglobin : 14.2 g/dL  Hematocrit : 41.9 %  Platelet Count - Automated : 175 K/uL  Mean Cell Volume : 99.1 fl  Mean Cell Hemoglobin : 33.6 pg  Mean Cell Hemoglobin Concentration : 33.9 gm/dL  Auto Neutrophil # : x  Auto Lymphocyte # : x  Auto Monocyte # : x  Auto Eosinophil # : x  Auto Basophil # : x  Auto Neutrophil % : x  Auto Lymphocyte % : x  Auto Monocyte % : x  Auto Eosinophil % : x  Auto Basophil % : x    09-21    142  |  104  |  14  ----------------------------<  95  4.1   |  23  |  1.12    Ca    9.6      21 Sep 2017 08:29    TPro  7.3  /  Alb  4.4  /  TBili  0.9  /  DBili  x   /  AST  29  /  ALT  45  /  AlkPhos  89  09-19    CAPILLARY BLOOD GLUCOSE        LIVER FUNCTIONS - ( 19 Sep 2017 12:22 )  Alb: 4.4 g/dL / Pro: 7.3 g/dL / ALK PHOS: 89 U/L / ALT: 45 U/L RC / AST: 29 U/L / GGT: x           PT/INR - ( 19 Sep 2017 12:22 )   PT: 12.3 sec;   INR: 1.13 ratio         PTT - ( 19 Sep 2017 12:22 )  PTT:33.1 sec    Male    Vitals:  Vital Signs Last 24 Hrs  T(C): 36.4 (21 Sep 2017 05:46), Max: 37 (20 Sep 2017 20:20)  T(F): 97.6 (21 Sep 2017 05:46), Max: 98.6 (20 Sep 2017 20:20)  HR: 47 (21 Sep 2017 05:46) (47 - 79)  BP: 105/65 (21 Sep 2017 05:46) (105/65 - 145/73)  BP(mean): --  RR: 18 (21 Sep 2017 05:46) (16 - 19)  SpO2: 94% (21 Sep 2017 05:46) (94% - 98%)    NEUROLOGICAL EXAM:    Mental status: Awake, alert, attends to examiner. Oriented to hospital but not year, month or day. Limited insight to hospitalization. Bradyphrenic.     Cranial Nerves: Pupils were equal, round, reactive to light. Extraocular movements were intact. Visual field were full. Fundoscopic exam was deferred. Decreased blink rate OU. Sacaddic pursuits. Facial sensation was intact to light touch. There was no facial asymmetry. Speech was clear but hypophonic. The palate was upgoing symmetrically and tongue was midline. Hearing acuity was intact to finger rub AU. Shoulder shrug was full bilaterally    Motor exam: Bulk was maryuri. Cogwheeling rigidity was noted on the right arm and wrist. Subtle increased tone on the left. Maintains LE antigravity > 5 seconds bilaterally. + Bradykiensia    Reflexes: Absent in the bilateral upper extremities. Trace in the bilateral lower extremities. The right toe was upgoing, left toe downgoing.     Sensation: Intact to light touch    Coordination: No gross dysmetria.     Gait: Difficulty standing and ambulating without assistance.       NIHSS: 4

## 2017-09-21 NOTE — PROGRESS NOTE ADULT - SUBJECTIVE AND OBJECTIVE BOX
MEDICINE, PROGRESS NOTE [phone]    EVELIN CHAVEZ 83y [id number]    Patient seen and examined.  Patient is a 83y old  Male who presents with a chief complaint of Multiple falls at home (19 Sep 2017 19:29)  Pt feels ok.    PAST MEDICAL & SURGICAL HISTORY:  Alzheimer disease: with psuedobulbular affect  BPH (benign prostatic hypertrophy)  Renal calculi  Hyperlipidemia  GERD (gastroesophageal reflux disease)  Nephrolithiasis  H/O cataract extraction, right  S/P tonsillectomy    MEDICATIONS  (STANDING):  aspirin enteric coated 81 milliGRAM(s) Oral daily  Nuedexta 20/10mg 1 Capsule(s) 1 Capsule(s) Oral every 12 hours  midodrine 5 milliGRAM(s) Oral two times a day  famotidine    Tablet 20 milliGRAM(s) Oral two times a day  heparin  Injectable 5000 Unit(s) SubCutaneous every 12 hours  ezetimibe 10 milliGRAM(s) Oral daily    MEDICATIONS  (PRN):    Allergies    IV dye (Rash)  penicillin (Rash)    Intolerances        PHYSICAL EXAM:  Constitutional: NAD  HEENT: Normocephalic, EOMI  Neck:  No JVD  Respiratory: CTA B/L, No wheezes  Cardiovascular: S1, S2, daniel  Gastrointestinal: BS+, soft, NT/ND  Extremities: No peripheral edema  Neurological: AAOX2  Psychiatric: flat affect  : No Barone    Vital Signs Last 24 Hrs  T(C): 37.2 (21 Sep 2017 12:19), Max: 37.2 (21 Sep 2017 12:19)  T(F): 98.9 (21 Sep 2017 12:19), Max: 98.9 (21 Sep 2017 12:19)  HR: 52 (21 Sep 2017 12:19) (47 - 54)  BP: 117/72 (21 Sep 2017 12:19) (105/65 - 145/73)  BP(mean): --  RR: 18 (21 Sep 2017 12:19) (16 - 19)  SpO2: 92% (21 Sep 2017 12:19) (92% - 98%)  I&O's Summary    20 Sep 2017 07:01  -  21 Sep 2017 07:00  --------------------------------------------------------  IN: 680 mL / OUT: 725 mL / NET: -45 mL    21 Sep 2017 07:01  -  21 Sep 2017 18:40  --------------------------------------------------------  IN: 960 mL / OUT: 600 mL / NET: 360 mL        LABS:                        14.2   7.52  )-----------( 175      ( 21 Sep 2017 08:16 )             41.9     09-21    142  |  104  |  14  ----------------------------<  95  4.1   |  23  |  1.12    Ca    9.6      21 Sep 2017 08:29                REVIEW OF SYSTEMS:      RADIOLOGY & ADDITIONAL STUDIES:      ASSESSMENT/PLAN:

## 2017-09-21 NOTE — PROGRESS NOTE ADULT - ASSESSMENT
Parkinsonism    Bradycardia   AD type dementia with pseudobulbar affect    continue current care  PT eval   appreciate neuro input  discussed with cardiology yesterday  d/c planning to rehab

## 2017-09-21 NOTE — CONSULT NOTE ADULT - ASSESSMENT
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 Riley revealed no evidence of atrial fibrillation, now again returning with falls and bradycardia.     1. Has some Parkinsonism on exam which is new compared to Dr. Campos's exam, greater on right than left and may be related to chronic left basal ganglia infarct. - vascular parkinsonism. Would not start dopaminergic agents as inpatient. Increases risk of delirium and relatively ineffective in vascular parkinsonism.     2. Continue ASA and atorvastatin for secondary stroke prophylaxis.    3. Bradycardia as per medicine/cardiology    4. PT    5. Fall precautions    6. Optimize BP    7. Avoid delirium provoking agents    8. Nudexta for pseudobulbar affect associated with mixed AD/vascular dementia    9. Avoid acetylcholinesterase inhibitors (aka donepezil) with bradycardia    10. Though CT head in hospital reports "can not rule out NPH", MRI brain performed in office in August 2017 reveals no transependymal flow of CSF.     11. Patient may benefit from inpatient rehab. Can follow up with Dr. Campos as outpatient.     12. Otherwise no further inpatient neurologic intervention. Please call back with any further questions. No neurologic objection to discharge when medically cleared.     13. Discussed with Dr. Tafoya via phone last night.

## 2017-09-22 LAB
ANION GAP SERPL CALC-SCNC: 16 MMOL/L — SIGNIFICANT CHANGE UP (ref 5–17)
BUN SERPL-MCNC: 14 MG/DL — SIGNIFICANT CHANGE UP (ref 7–23)
CALCIUM SERPL-MCNC: 9.8 MG/DL — SIGNIFICANT CHANGE UP (ref 8.4–10.5)
CHLORIDE SERPL-SCNC: 102 MMOL/L — SIGNIFICANT CHANGE UP (ref 96–108)
CO2 SERPL-SCNC: 24 MMOL/L — SIGNIFICANT CHANGE UP (ref 22–31)
CREAT SERPL-MCNC: 1.09 MG/DL — SIGNIFICANT CHANGE UP (ref 0.5–1.3)
GLUCOSE SERPL-MCNC: 90 MG/DL — SIGNIFICANT CHANGE UP (ref 70–99)
HCT VFR BLD CALC: 45 % — SIGNIFICANT CHANGE UP (ref 39–50)
HGB BLD-MCNC: 14.9 G/DL — SIGNIFICANT CHANGE UP (ref 13–17)
MAGNESIUM SERPL-MCNC: 2 MG/DL — SIGNIFICANT CHANGE UP (ref 1.6–2.6)
MCHC RBC-ENTMCNC: 32.8 PG — SIGNIFICANT CHANGE UP (ref 27–34)
MCHC RBC-ENTMCNC: 33.1 GM/DL — SIGNIFICANT CHANGE UP (ref 32–36)
MCV RBC AUTO: 99.1 FL — SIGNIFICANT CHANGE UP (ref 80–100)
PHOSPHATE SERPL-MCNC: 3.1 MG/DL — SIGNIFICANT CHANGE UP (ref 2.5–4.5)
PLATELET # BLD AUTO: 188 K/UL — SIGNIFICANT CHANGE UP (ref 150–400)
POTASSIUM SERPL-MCNC: 4.8 MMOL/L — SIGNIFICANT CHANGE UP (ref 3.5–5.3)
POTASSIUM SERPL-SCNC: 4.8 MMOL/L — SIGNIFICANT CHANGE UP (ref 3.5–5.3)
RBC # BLD: 4.54 M/UL — SIGNIFICANT CHANGE UP (ref 4.2–5.8)
RBC # FLD: 13.6 % — SIGNIFICANT CHANGE UP (ref 10.3–14.5)
SODIUM SERPL-SCNC: 142 MMOL/L — SIGNIFICANT CHANGE UP (ref 135–145)
WBC # BLD: 8.37 K/UL — SIGNIFICANT CHANGE UP (ref 3.8–10.5)
WBC # FLD AUTO: 8.37 K/UL — SIGNIFICANT CHANGE UP (ref 3.8–10.5)

## 2017-09-22 RX ADMIN — HEPARIN SODIUM 5000 UNIT(S): 5000 INJECTION INTRAVENOUS; SUBCUTANEOUS at 17:51

## 2017-09-22 RX ADMIN — EZETIMIBE 10 MILLIGRAM(S): 10 TABLET ORAL at 11:46

## 2017-09-22 RX ADMIN — FAMOTIDINE 20 MILLIGRAM(S): 10 INJECTION INTRAVENOUS at 05:01

## 2017-09-22 RX ADMIN — FAMOTIDINE 20 MILLIGRAM(S): 10 INJECTION INTRAVENOUS at 17:51

## 2017-09-22 RX ADMIN — Medication 81 MILLIGRAM(S): at 11:45

## 2017-09-22 RX ADMIN — MIDODRINE HYDROCHLORIDE 5 MILLIGRAM(S): 2.5 TABLET ORAL at 17:51

## 2017-09-22 RX ADMIN — HEPARIN SODIUM 5000 UNIT(S): 5000 INJECTION INTRAVENOUS; SUBCUTANEOUS at 05:01

## 2017-09-22 RX ADMIN — MIDODRINE HYDROCHLORIDE 5 MILLIGRAM(S): 2.5 TABLET ORAL at 05:01

## 2017-09-22 NOTE — PROGRESS NOTE ADULT - ASSESSMENT
Parkinsonism    Bradycardia   AD type dementia with pseudobulbar affect    continue current care  d/c planning to rehab

## 2017-09-22 NOTE — PHYSICAL THERAPY INITIAL EVALUATION ADULT - ADDITIONAL COMMENTS
CT head 9/19/17No acute intracranial hemorrhage, mass effect, or midline shift. Imaging findings for which normal pressure hydrocephalus can be considered. Finding appears unchanged from the prior CT examination. 6/25/17 X ray pelvis No evidence of an acute fracture or dislocation. Preserved hip joint   spaces. Pt lives at home with wife a few steps to enter house with hand rail, has been staying on first floor, pt stated he was independent with ambulation and ADL's  CT head 9/19/17No acute intracranial hemorrhage, mass effect, or midline shift. Imaging findings for which normal pressure hydrocephalus can be considered. Finding appears unchanged from the prior CT examination. 6/25/17 X ray pelvis No evidence of an acute fracture or dislocation. Preserved hip joint   spaces.

## 2017-09-22 NOTE — PROGRESS NOTE ADULT - SUBJECTIVE AND OBJECTIVE BOX
MEDICINE, PROGRESS NOTE [phone]    EVELIN CHAVEZ 83y [id number]    Patient seen and examined.  Patient is a 83y old  Male who presents with a chief complaint of Multiple falls at home (19 Sep 2017 19:29)  Pt pleasantly confused.    PAST MEDICAL & SURGICAL HISTORY:  Alzheimer disease: with psuedobulbular affect  BPH (benign prostatic hypertrophy)  Renal calculi  Hyperlipidemia  GERD (gastroesophageal reflux disease)  Nephrolithiasis  H/O cataract extraction, right  S/P tonsillectomy    MEDICATIONS  (STANDING):  aspirin enteric coated 81 milliGRAM(s) Oral daily  Nuedexta 20/10mg 1 Capsule(s) 1 Capsule(s) Oral every 12 hours  midodrine 5 milliGRAM(s) Oral two times a day  famotidine    Tablet 20 milliGRAM(s) Oral two times a day  heparin  Injectable 5000 Unit(s) SubCutaneous every 12 hours  ezetimibe 10 milliGRAM(s) Oral daily    MEDICATIONS  (PRN):    Allergies    IV dye (Rash)  penicillin (Rash)    Intolerances        PHYSICAL EXAM:  Constitutional: NAD  HEENT: Normocephalic, EOMI  Neck:  No JVD  Respiratory: CTA B/L, No wheezes  Cardiovascular: S1, S2, RRR, + systolic murmur  Gastrointestinal: BS+, soft, NT/ND  Extremities: + peripheral edema le b/l  Neurological: AAOX3  Psychiatric: flat affect  : No Barone    Vital Signs Last 24 Hrs  T(C): 36.5 (22 Sep 2017 04:57), Max: 37.4 (21 Sep 2017 21:01)  T(F): 97.7 (22 Sep 2017 04:57), Max: 99.3 (21 Sep 2017 21:01)  HR: 53 (22 Sep 2017 04:57) (53 - 53)  BP: 128/72 (22 Sep 2017 04:57) (124/62 - 128/72)  BP(mean): --  RR: 18 (22 Sep 2017 04:57) (18 - 18)  SpO2: 98% (22 Sep 2017 04:57) (94% - 98%)  I&O's Summary    21 Sep 2017 07:01  -  22 Sep 2017 07:00  --------------------------------------------------------  IN: 1180 mL / OUT: 1000 mL / NET: 180 mL        LABS:                        14.9   8.37  )-----------( 188      ( 22 Sep 2017 07:39 )             45.0     09-22    142  |  102  |  14  ----------------------------<  90  4.8   |  24  |  1.09    Ca    9.8      22 Sep 2017 07:55  Phos  3.1     09-22  Mg     2.0     09-22          Magnesium, Serum: 2.0 mg/dL (09-22 @ 07:55)        REVIEW OF SYSTEMS:      RADIOLOGY & ADDITIONAL STUDIES:      ASSESSMENT/PLAN:

## 2017-09-22 NOTE — PHYSICAL THERAPY INITIAL EVALUATION ADULT - PERTINENT HX OF CURRENT PROBLEM, REHAB EVAL
Pt is a 83 year old male admitted to [facility] on 9/19/17 for Multiple falls at home sent in for concern for bradycardia. States that he remembers falling and states that he feels like his feet are glued to the floor. not on blood thinners.  Most recent fall was this past weekend but denies head injury see below in additional comments

## 2017-09-23 LAB
HCT VFR BLD CALC: 45.4 % — SIGNIFICANT CHANGE UP (ref 39–50)
HGB BLD-MCNC: 15.5 G/DL — SIGNIFICANT CHANGE UP (ref 13–17)
MCHC RBC-ENTMCNC: 34.1 GM/DL — SIGNIFICANT CHANGE UP (ref 32–36)
MCHC RBC-ENTMCNC: 35.3 PG — HIGH (ref 27–34)
MCV RBC AUTO: 103 FL — HIGH (ref 80–100)
PLATELET # BLD AUTO: 160 K/UL — SIGNIFICANT CHANGE UP (ref 150–400)
RBC # BLD: 4.39 M/UL — SIGNIFICANT CHANGE UP (ref 4.2–5.8)
RBC # FLD: 12 % — SIGNIFICANT CHANGE UP (ref 10.3–14.5)
WBC # BLD: 11.1 K/UL — HIGH (ref 3.8–10.5)
WBC # FLD AUTO: 11.1 K/UL — HIGH (ref 3.8–10.5)

## 2017-09-23 RX ADMIN — HEPARIN SODIUM 5000 UNIT(S): 5000 INJECTION INTRAVENOUS; SUBCUTANEOUS at 17:43

## 2017-09-23 RX ADMIN — FAMOTIDINE 20 MILLIGRAM(S): 10 INJECTION INTRAVENOUS at 17:43

## 2017-09-23 RX ADMIN — MIDODRINE HYDROCHLORIDE 5 MILLIGRAM(S): 2.5 TABLET ORAL at 17:43

## 2017-09-23 RX ADMIN — Medication 81 MILLIGRAM(S): at 11:45

## 2017-09-23 RX ADMIN — MIDODRINE HYDROCHLORIDE 5 MILLIGRAM(S): 2.5 TABLET ORAL at 06:41

## 2017-09-23 RX ADMIN — EZETIMIBE 10 MILLIGRAM(S): 10 TABLET ORAL at 11:45

## 2017-09-23 RX ADMIN — FAMOTIDINE 20 MILLIGRAM(S): 10 INJECTION INTRAVENOUS at 06:41

## 2017-09-23 NOTE — PROGRESS NOTE ADULT - SUBJECTIVE AND OBJECTIVE BOX
Patient is a 83y old  Male who presents with a chief complaint of Multiple falls at home (19 Sep 2017 19:29)      SUBJECTIVE / OVERNIGHT EVENTS:   Feels better.  Denies CP/SOB/Palpitation/HA.    MEDICATIONS  (STANDING):  aspirin enteric coated 81 milliGRAM(s) Oral daily  Nuedexta 20/10mg 1 Capsule(s) 1 Capsule(s) Oral every 12 hours  midodrine 5 milliGRAM(s) Oral two times a day  famotidine    Tablet 20 milliGRAM(s) Oral two times a day  heparin  Injectable 5000 Unit(s) SubCutaneous every 12 hours  ezetimibe 10 milliGRAM(s) Oral daily    MEDICATIONS  (PRN):        CAPILLARY BLOOD GLUCOSE        I&O's Summary    22 Sep 2017 07:01  -  23 Sep 2017 07:00  --------------------------------------------------------  IN: 820 mL / OUT: 1000 mL / NET: -180 mL    23 Sep 2017 07:01  -  23 Sep 2017 19:21  --------------------------------------------------------  IN: 720 mL / OUT: 200 mL / NET: 520 mL        PHYSICAL EXAM:  GENERAL: NAD, well-developed  HEAD:  Atraumatic, Normocephalic  EYES: conjunctiva and sclera clear  NECK: Supple, No JVD  CHEST/LUNG: Clear to auscultation bilaterally; No wheeze  HEART: Regular rate and rhythm; No murmurs, rubs, or gallops  ABDOMEN: Soft, Nontender, Nondistended; Bowel sounds present  EXTREMITIES:  2+ Peripheral Pulses, No clubbing, cyanosis, or edema  NEUROLOGY: AAO X 3  SKIN: No rashes    LABS:                        15.5   11.1  )-----------( 160      ( 23 Sep 2017 00:58 )             45.4     09-22    142  |  102  |  14  ----------------------------<  90  4.8   |  24  |  1.09    Ca    9.8      22 Sep 2017 07:55  Phos  3.1     09-22  Mg     2.0     09-22              CAPILLARY BLOOD GLUCOSE                    RADIOLOGY & ADDITIONAL TESTS:    Imaging Personally Reviewed:    Consultant(s) Notes Reviewed:      Care Discussed with Consultants/Other Providers:

## 2017-09-23 NOTE — CHART NOTE - NSCHARTNOTEFT_GEN_A_CORE
At 2348 RN notified provider that at 2200 PCA visualized blood streaked BM before pt flushed it. According to RN, it was bright red.  Patient seen and examined at bedside.  Patient reported that the was "minute" amount of bright red blood in stool. Denied hx of GIB, hemorrhoids, or constipation.      Vital Signs Last 24 Hrs  T(C): 36.7 (22 Sep 2017 20:49), Max: 36.8 (22 Sep 2017 13:09)  T(F): 98 (22 Sep 2017 20:49), Max: 98.3 (22 Sep 2017 13:09)  HR: 54 (22 Sep 2017 20:49) (53 - 79)  BP: 139/75 (22 Sep 2017 20:49) (128/72 - 139/75)  BP(mean): --  RR: 18 (22 Sep 2017 20:49) (18 - 18)  SpO2: 95% (22 Sep 2017 20:49) (95% - 98%)      Labs:                          14.9   8.37  )-----------( 188      ( 22 Sep 2017 07:39 )             45.0     09-22    142  |  102  |  14  ----------------------------<  90  4.8   |  24  |  1.09    Ca    9.8      22 Sep 2017 07:55  Phos  3.1     09-22  Mg     2.0     09-22        Physical Exam:  General: NAD, non-toxic, resting comfortably in bed  Neurology: alert and oriented   Head:  Normocephalic, atraumatic  Respiratory: CTA B/L  CV: RRR, S1S2  Abdominal: Soft, NT, + bowel sounds  Rectal: + external hemorrhoids, no active bleeding  EXT: +edema b/l LE    Assessment & Plan:  83 year old with PMHx of Alzheimer disease, BPH, UTI, HLD, GERD admitted with frequent falls and bradycardia now with BRBPR.    1. BRBPR, possibly in the setting of hemorrhoids vs GIB  - "streak" of blood in stool visualized by nursing staff  - no overt signs of active bleeding  -  check occult blood  - check CBC now, trend  - monitor VS closely  - Paged Dr. Covarrubias twice, covering for Dr. Tafoya, awaiting a call back  - GI consult as deemed by Attending      Follow up with Attending in AM.    Shirley Friedman, NP  Medicine  #[id number]

## 2017-09-24 RX ADMIN — MIDODRINE HYDROCHLORIDE 5 MILLIGRAM(S): 2.5 TABLET ORAL at 17:58

## 2017-09-24 RX ADMIN — FAMOTIDINE 20 MILLIGRAM(S): 10 INJECTION INTRAVENOUS at 17:58

## 2017-09-24 RX ADMIN — HEPARIN SODIUM 5000 UNIT(S): 5000 INJECTION INTRAVENOUS; SUBCUTANEOUS at 18:00

## 2017-09-24 RX ADMIN — FAMOTIDINE 20 MILLIGRAM(S): 10 INJECTION INTRAVENOUS at 05:22

## 2017-09-24 RX ADMIN — EZETIMIBE 10 MILLIGRAM(S): 10 TABLET ORAL at 12:04

## 2017-09-24 RX ADMIN — MIDODRINE HYDROCHLORIDE 5 MILLIGRAM(S): 2.5 TABLET ORAL at 05:22

## 2017-09-24 RX ADMIN — Medication 81 MILLIGRAM(S): at 12:04

## 2017-09-24 RX ADMIN — HEPARIN SODIUM 5000 UNIT(S): 5000 INJECTION INTRAVENOUS; SUBCUTANEOUS at 05:22

## 2017-09-24 NOTE — PROGRESS NOTE ADULT - SUBJECTIVE AND OBJECTIVE BOX
Patient is a 83y old  Male who presents with a chief complaint of Multiple falls at home (19 Sep 2017 19:29)      SUBJECTIVE / OVERNIGHT EVENTS:   Feels better.  Denies CP/SOB/Palpitation/HA.    MEDICATIONS  (STANDING):  aspirin enteric coated 81 milliGRAM(s) Oral daily  Nuedexta 20/10mg 1 Capsule(s) 1 Capsule(s) Oral every 12 hours  midodrine 5 milliGRAM(s) Oral two times a day  famotidine    Tablet 20 milliGRAM(s) Oral two times a day  heparin  Injectable 5000 Unit(s) SubCutaneous every 12 hours  ezetimibe 10 milliGRAM(s) Oral daily    MEDICATIONS  (PRN):        CAPILLARY BLOOD GLUCOSE        I&O's Summary    23 Sep 2017 07:01  -  24 Sep 2017 07:00  --------------------------------------------------------  IN: 720 mL / OUT: 250 mL / NET: 470 mL    24 Sep 2017 07:01  -  24 Sep 2017 19:45  --------------------------------------------------------  IN: 960 mL / OUT: 400 mL / NET: 560 mL        PHYSICAL EXAM:  GENERAL: NAD, well-developed  HEAD:  Atraumatic, Normocephalic  EYES: conjunctiva and sclera clear  NECK: Supple, No JVD  CHEST/LUNG: Clear to auscultation bilaterally; No wheeze  HEART: Regular rate and rhythm; No murmurs, rubs, or gallops  ABDOMEN: Soft, Nontender, Nondistended; Bowel sounds present  EXTREMITIES:  2+ Peripheral Pulses, No clubbing, cyanosis, or edema  NEUROLOGY: AAO   SKIN: No rashes    LABS:                        15.5   11.1  )-----------( 160      ( 23 Sep 2017 00:58 )             45.4                   CAPILLARY BLOOD GLUCOSE                    RADIOLOGY & ADDITIONAL TESTS:    Imaging Personally Reviewed:    Consultant(s) Notes Reviewed:      Care Discussed with Consultants/Other Providers:

## 2017-09-25 LAB
HCT VFR BLD CALC: 41.4 % — SIGNIFICANT CHANGE UP (ref 39–50)
HGB BLD-MCNC: 13.8 G/DL — SIGNIFICANT CHANGE UP (ref 13–17)
MCHC RBC-ENTMCNC: 32.9 PG — SIGNIFICANT CHANGE UP (ref 27–34)
MCHC RBC-ENTMCNC: 33.3 GM/DL — SIGNIFICANT CHANGE UP (ref 32–36)
MCV RBC AUTO: 98.6 FL — SIGNIFICANT CHANGE UP (ref 80–100)
PLATELET # BLD AUTO: 180 K/UL — SIGNIFICANT CHANGE UP (ref 150–400)
RBC # BLD: 4.2 M/UL — SIGNIFICANT CHANGE UP (ref 4.2–5.8)
RBC # FLD: 13.7 % — SIGNIFICANT CHANGE UP (ref 10.3–14.5)
WBC # BLD: 7.65 K/UL — SIGNIFICANT CHANGE UP (ref 3.8–10.5)
WBC # FLD AUTO: 7.65 K/UL — SIGNIFICANT CHANGE UP (ref 3.8–10.5)

## 2017-09-25 RX ADMIN — HEPARIN SODIUM 5000 UNIT(S): 5000 INJECTION INTRAVENOUS; SUBCUTANEOUS at 18:21

## 2017-09-25 RX ADMIN — MIDODRINE HYDROCHLORIDE 5 MILLIGRAM(S): 2.5 TABLET ORAL at 18:21

## 2017-09-25 RX ADMIN — EZETIMIBE 10 MILLIGRAM(S): 10 TABLET ORAL at 11:46

## 2017-09-25 RX ADMIN — MIDODRINE HYDROCHLORIDE 5 MILLIGRAM(S): 2.5 TABLET ORAL at 05:56

## 2017-09-25 RX ADMIN — HEPARIN SODIUM 5000 UNIT(S): 5000 INJECTION INTRAVENOUS; SUBCUTANEOUS at 05:57

## 2017-09-25 RX ADMIN — FAMOTIDINE 20 MILLIGRAM(S): 10 INJECTION INTRAVENOUS at 18:20

## 2017-09-25 RX ADMIN — FAMOTIDINE 20 MILLIGRAM(S): 10 INJECTION INTRAVENOUS at 05:56

## 2017-09-25 RX ADMIN — Medication 81 MILLIGRAM(S): at 11:46

## 2017-09-25 NOTE — PROGRESS NOTE ADULT - ASSESSMENT
ad with pseudobulbar effect - pt follows commands and is excited to go to rehab  mechanical falls    continue current care  encourage activity with assistance in house  d/c planning to rehab

## 2017-09-25 NOTE — PROVIDER CONTACT NOTE (OTHER) - ASSESSMENT
Pt axox2--3,forgetful, vss, no sob or vomiting noted.lungs clear, no chest pain or palpitation noted,no dizziness noted.

## 2017-09-25 NOTE — PROGRESS NOTE ADULT - SUBJECTIVE AND OBJECTIVE BOX
MEDICINE, PROGRESS NOTE [phone]    EVELIN CHAVEZ 83y [id number]    Patient seen and examined.  Patient is a 83y old  Male who presents with a chief complaint of Multiple falls at home (19 Sep 2017 19:29)      PAST MEDICAL & SURGICAL HISTORY:  Alzheimer disease: with psuedobulbular affect  BPH (benign prostatic hypertrophy)  Renal calculi  Hyperlipidemia  GERD (gastroesophageal reflux disease)  Nephrolithiasis  H/O cataract extraction, right  S/P tonsillectomy    MEDICATIONS  (STANDING):  aspirin enteric coated 81 milliGRAM(s) Oral daily  Nuedexta 20/10mg 1 Capsule(s) 1 Capsule(s) Oral every 12 hours  midodrine 5 milliGRAM(s) Oral two times a day  famotidine    Tablet 20 milliGRAM(s) Oral two times a day  heparin  Injectable 5000 Unit(s) SubCutaneous every 12 hours  ezetimibe 10 milliGRAM(s) Oral daily    MEDICATIONS  (PRN):    Allergies    IV dye (Rash)  penicillin (Rash)    Intolerances        PHYSICAL EXAM:  Constitutional: NAD  HEENT: Normocephalic, EOMI  Neck:  No JVD  Respiratory: CTA B/L, No wheezes  Cardiovascular: S1, S2, daniel  Gastrointestinal: BS+, soft, NT/ND  Extremities: No peripheral edema  Neurological: AAOX3, no focal deficits  Psychiatric: Normal mood, normal affect  : No Barone    Vital Signs Last 24 Hrs  T(C): 36.6 (25 Sep 2017 12:18), Max: 37.3 (24 Sep 2017 21:13)  T(F): 97.9 (25 Sep 2017 12:18), Max: 99.2 (24 Sep 2017 21:13)  HR: 55 (25 Sep 2017 12:18) (55 - 75)  BP: 116/71 (25 Sep 2017 12:18) (116/71 - 125/76)  BP(mean): --  RR: 18 (25 Sep 2017 12:18) (18 - 19)  SpO2: 93% (25 Sep 2017 12:18) (92% - 95%)  I&O's Summary    24 Sep 2017 07:01  -  25 Sep 2017 07:00  --------------------------------------------------------  IN: 1140 mL / OUT: 1000 mL / NET: 140 mL    25 Sep 2017 07:01  -  25 Sep 2017 17:51  --------------------------------------------------------  IN: 600 mL / OUT: 500 mL / NET: 100 mL        LABS:                        13.8   7.65  )-----------( 180      ( 25 Sep 2017 07:34 )             41.4                     REVIEW OF SYSTEMS:      RADIOLOGY & ADDITIONAL STUDIES:      ASSESSMENT/PLAN:

## 2017-09-26 RX ADMIN — FAMOTIDINE 20 MILLIGRAM(S): 10 INJECTION INTRAVENOUS at 18:08

## 2017-09-26 RX ADMIN — EZETIMIBE 10 MILLIGRAM(S): 10 TABLET ORAL at 12:32

## 2017-09-26 RX ADMIN — Medication 81 MILLIGRAM(S): at 12:32

## 2017-09-26 RX ADMIN — FAMOTIDINE 20 MILLIGRAM(S): 10 INJECTION INTRAVENOUS at 06:27

## 2017-09-26 RX ADMIN — HEPARIN SODIUM 5000 UNIT(S): 5000 INJECTION INTRAVENOUS; SUBCUTANEOUS at 18:08

## 2017-09-26 RX ADMIN — MIDODRINE HYDROCHLORIDE 5 MILLIGRAM(S): 2.5 TABLET ORAL at 06:27

## 2017-09-26 RX ADMIN — MIDODRINE HYDROCHLORIDE 5 MILLIGRAM(S): 2.5 TABLET ORAL at 18:08

## 2017-09-26 RX ADMIN — HEPARIN SODIUM 5000 UNIT(S): 5000 INJECTION INTRAVENOUS; SUBCUTANEOUS at 06:27

## 2017-09-26 NOTE — PROVIDER CONTACT NOTE (FALL NOTIFICATION) - ASSESSMENT
Patient says-nothing hurt,no signs of any injury.Patient didn't hit his head,just lowered him to the floor.

## 2017-09-26 NOTE — PROGRESS NOTE ADULT - ASSESSMENT
Parkinsonism    Bradycardia   AD type dementia with pseudobulbar affect   multiple falls    continue current care  PT  d/c planning to rehab

## 2017-09-26 NOTE — PROVIDER CONTACT NOTE (FALL NOTIFICATION) - SITUATION
Patient was climbing out of bed,while nurse and PCA were doing care for other pt in the room.Nurse ran to pt and tried to hold him to prevent from falling,but it was unsuccesful.So lowerd him to floo

## 2017-09-26 NOTE — PROGRESS NOTE ADULT - SUBJECTIVE AND OBJECTIVE BOX
MEDICINE, PROGRESS NOTE [phone]    EVELIN CHAVEZ 83y [id number]    Patient seen and examined.  Patient is a 83y old  Male who presents with a chief complaint of Multiple falls at home (19 Sep 2017 19:29)  Pt with documented fall.    PAST MEDICAL & SURGICAL HISTORY:  Alzheimer disease: with psuedobulbular affect  BPH (benign prostatic hypertrophy)  Renal calculi  Hyperlipidemia  GERD (gastroesophageal reflux disease)  Nephrolithiasis  H/O cataract extraction, right  S/P tonsillectomy    MEDICATIONS  (STANDING):  aspirin enteric coated 81 milliGRAM(s) Oral daily  Nuedexta 20/10mg 1 Capsule(s) 1 Capsule(s) Oral every 12 hours  midodrine 5 milliGRAM(s) Oral two times a day  famotidine    Tablet 20 milliGRAM(s) Oral two times a day  heparin  Injectable 5000 Unit(s) SubCutaneous every 12 hours  ezetimibe 10 milliGRAM(s) Oral daily    MEDICATIONS  (PRN):    Allergies    IV dye (Rash)  penicillin (Rash)    Intolerances        PHYSICAL EXAM:  Constitutional: NAD  HEENT: Normocephalic, EOMI  Neck:  No JVD  Respiratory: CTA B/L, No wheezes  Cardiovascular: S1, S2, daniel, + systolic murmur  Gastrointestinal: BS+, soft, NT/ND  Extremities: No peripheral edema  Neurological: AAOX3, no focal deficits  Psychiatric: Normal mood, normal affect  : No Barone    Vital Signs Last 24 Hrs  T(C): 36.9 (26 Sep 2017 18:06), Max: 37.1 (26 Sep 2017 02:10)  T(F): 98.4 (26 Sep 2017 18:06), Max: 98.8 (26 Sep 2017 02:10)  HR: 77 (26 Sep 2017 18:06) (50 - 77)  BP: 115/75 (26 Sep 2017 18:06) (113/70 - 129/73)  BP(mean): --  RR: 18 (26 Sep 2017 18:06) (17 - 18)  SpO2: 96% (26 Sep 2017 18:06) (95% - 97%)  I&O's Summary    25 Sep 2017 07:01  -  26 Sep 2017 07:00  --------------------------------------------------------  IN: 1130 mL / OUT: 1100 mL / NET: 30 mL    26 Sep 2017 07:01  -  26 Sep 2017 21:21  --------------------------------------------------------  IN: 470 mL / OUT: 701 mL / NET: -231 mL        LABS:                        13.8   7.65  )-----------( 180      ( 25 Sep 2017 07:34 )             41.4                     REVIEW OF SYSTEMS:      RADIOLOGY & ADDITIONAL STUDIES:      ASSESSMENT/PLAN:

## 2017-09-26 NOTE — PROVIDER CONTACT NOTE (FALL NOTIFICATION) - ACTION/TREATMENT ORDERED:
Notified QUINTON Nieto,came and examined the patient Notified QUINTON Nieto,came and examined the patient,no signs of any injury AS per his exam.No test indicated and ordered.Activated the patient on middle  alarmWill continue to monitor the patient closely,

## 2017-09-27 RX ADMIN — FAMOTIDINE 20 MILLIGRAM(S): 10 INJECTION INTRAVENOUS at 18:21

## 2017-09-27 RX ADMIN — FAMOTIDINE 20 MILLIGRAM(S): 10 INJECTION INTRAVENOUS at 06:18

## 2017-09-27 RX ADMIN — HEPARIN SODIUM 5000 UNIT(S): 5000 INJECTION INTRAVENOUS; SUBCUTANEOUS at 18:21

## 2017-09-27 RX ADMIN — EZETIMIBE 10 MILLIGRAM(S): 10 TABLET ORAL at 14:25

## 2017-09-27 RX ADMIN — HEPARIN SODIUM 5000 UNIT(S): 5000 INJECTION INTRAVENOUS; SUBCUTANEOUS at 06:18

## 2017-09-27 RX ADMIN — Medication 81 MILLIGRAM(S): at 14:27

## 2017-09-27 RX ADMIN — MIDODRINE HYDROCHLORIDE 5 MILLIGRAM(S): 2.5 TABLET ORAL at 18:21

## 2017-09-27 RX ADMIN — MIDODRINE HYDROCHLORIDE 5 MILLIGRAM(S): 2.5 TABLET ORAL at 06:18

## 2017-09-27 NOTE — DIETITIAN INITIAL EVALUATION ADULT. - ENERGY NEEDS
Ht: 63 inches Wt: 161pounds BMI: 28.5kg/m2 IBW: 124 pounds(+/-10%) %%  No edema. No pressure ulcers documented.

## 2017-09-27 NOTE — PROGRESS NOTE ADULT - ASSESSMENT
Parkinsonism    Bradycardia   AD type dementia with pseudobulbar affect   multiple falls    continue current care  PT  pt is a fall risk and needs PT  d/c planning to rehab

## 2017-09-27 NOTE — PROGRESS NOTE ADULT - SUBJECTIVE AND OBJECTIVE BOX
MEDICINE, PROGRESS NOTE [phone]    EVELIN CHAVEZ 83y [id number]    Patient seen and examined.  Patient is a 83y old  Male who presents with a chief complaint of Multiple falls at home (19 Sep 2017 19:29)  Pt feels ok, no new complaints. Fall note seen.    PAST MEDICAL & SURGICAL HISTORY:  Alzheimer disease: with psuedobulbular affect  BPH (benign prostatic hypertrophy)  Renal calculi  Hyperlipidemia  GERD (gastroesophageal reflux disease)  Nephrolithiasis  H/O cataract extraction, right  S/P tonsillectomy    MEDICATIONS  (STANDING):  aspirin enteric coated 81 milliGRAM(s) Oral daily  Nuedexta 20/10mg 1 Capsule(s) 1 Capsule(s) Oral every 12 hours  midodrine 5 milliGRAM(s) Oral two times a day  famotidine    Tablet 20 milliGRAM(s) Oral two times a day  heparin  Injectable 5000 Unit(s) SubCutaneous every 12 hours  ezetimibe 10 milliGRAM(s) Oral daily    MEDICATIONS  (PRN):    Allergies    IV dye (Rash)  penicillin (Rash)    Intolerances        PHYSICAL EXAM:  Constitutional: NAD  HEENT: Normocephalic, EOMI  Neck:  No JVD  Respiratory: CTA B/L, No wheezes  Cardiovascular: S1, S2, RRR, + systolic murmur  Gastrointestinal: BS+, soft, NT/ND  Extremities: No peripheral edema  Neurological: AAOX3, no focal deficits  Psychiatric: Normal mood, flat affect  : No Barone    Vital Signs Last 24 Hrs  T(C): 36.7 (27 Sep 2017 11:13), Max: 36.9 (26 Sep 2017 18:06)  T(F): 98.1 (27 Sep 2017 11:13), Max: 98.4 (26 Sep 2017 18:06)  HR: 60 (27 Sep 2017 11:13) (50 - 77)  BP: 116/79 (27 Sep 2017 11:13) (110/68 - 131/78)  BP(mean): --  RR: 18 (27 Sep 2017 11:13) (18 - 18)  SpO2: 96% (27 Sep 2017 11:13) (95% - 98%)  I&O's Summary    26 Sep 2017 07:01  -  27 Sep 2017 07:00  --------------------------------------------------------  IN: 530 mL / OUT: 951 mL / NET: -421 mL    27 Sep 2017 07:01  -  27 Sep 2017 16:28  --------------------------------------------------------  IN: 480 mL / OUT: 125 mL / NET: 355 mL        LABS:                    REVIEW OF SYSTEMS:      RADIOLOGY & ADDITIONAL STUDIES:      ASSESSMENT/PLAN:

## 2017-09-27 NOTE — DIETITIAN INITIAL EVALUATION ADULT. - OTHER INFO
Pt seen for length of stay. Pt is 83 year old male with Alzheimer's, HLD,  presenting with frequent falls (3x this week). Pt reports good appetite, breakfast tray observed, >75% consumed. Per chart, 100% po intake past several days. Pt denies any history of chewing/swallowing difficulty or GI distress at this time. Last BM today. Pt reports stable weight.

## 2017-09-27 NOTE — DIETITIAN INITIAL EVALUATION ADULT. - NS AS NUTRI INTERV MEALS SNACK
1. Provide food preferences as requested by Pt/family within diet restrictions  2. Encourage PO intake during meal times/General/healthful diet

## 2017-09-28 ENCOUNTER — TRANSCRIPTION ENCOUNTER (OUTPATIENT)
Age: 82
End: 2017-09-28

## 2017-09-28 RX ADMIN — MIDODRINE HYDROCHLORIDE 5 MILLIGRAM(S): 2.5 TABLET ORAL at 05:40

## 2017-09-28 RX ADMIN — EZETIMIBE 10 MILLIGRAM(S): 10 TABLET ORAL at 11:17

## 2017-09-28 RX ADMIN — HEPARIN SODIUM 5000 UNIT(S): 5000 INJECTION INTRAVENOUS; SUBCUTANEOUS at 05:39

## 2017-09-28 RX ADMIN — HEPARIN SODIUM 5000 UNIT(S): 5000 INJECTION INTRAVENOUS; SUBCUTANEOUS at 18:06

## 2017-09-28 RX ADMIN — MIDODRINE HYDROCHLORIDE 5 MILLIGRAM(S): 2.5 TABLET ORAL at 18:06

## 2017-09-28 RX ADMIN — FAMOTIDINE 20 MILLIGRAM(S): 10 INJECTION INTRAVENOUS at 05:40

## 2017-09-28 RX ADMIN — FAMOTIDINE 20 MILLIGRAM(S): 10 INJECTION INTRAVENOUS at 18:06

## 2017-09-28 RX ADMIN — Medication 81 MILLIGRAM(S): at 11:18

## 2017-09-28 NOTE — DISCHARGE NOTE ADULT - PATIENT PORTAL LINK FT
“You can access the Follow[employer] Patient Portal, offered by [facility], by registering with the following website: [email or web address]”

## 2017-09-28 NOTE — DISCHARGE NOTE ADULT - MEDICATION SUMMARY - MEDICATIONS TO TAKE
I will START or STAY ON the medications listed below when I get home from the hospital:    aspirin 81 mg oral delayed release tablet  -- 1 tab(s) by mouth once a day  -- Indication: For Cad    Zetia 10 mg oral tablet  -- 1 tab(s) by mouth once a day  -- Indication: For Cad    raNITIdine 150 mg oral capsule  -- 1 cap(s) by mouth 2 times a day  -- Indication: For Allergy    midodrine 5 mg oral tablet  -- 1 tab(s) by mouth 2 times a day  -- Indication: For Blood pressure     Nuedexta 20 mg-10 mg oral capsule  -- 1 cap(s) by mouth every 12 hours  -- Indication: For home meds     ICaps with Lutein and Zeaxan oral tablet  -- 1 tab(s) by mouth once a day  -- Indication: For home meds     Vitamin D3 2000 intl units oral capsule  -- 1 cap(s) by mouth 2 times a day  -- Indication: For Supplement I will START or STAY ON the medications listed below when I get home from the hospital:    aspirin 81 mg oral delayed release tablet  -- 1 tab(s) by mouth once a day  -- Indication: For Cad    Zetia 10 mg oral tablet  -- 1 tab(s) by mouth once a day  -- Indication: For Cad    raNITIdine 150 mg oral capsule  -- 1 cap(s) by mouth 2 times a day  -- Indication: For Allergy    midodrine 5 mg oral tablet  -- 1 tab(s) by mouth 2 times a day  -- Indication: For Blood pressure     Nuedexta 20 mg-10 mg oral capsule  -- 1 cap(s) by mouth every 12 hours  -- Indication: For Speudobulbulbar affect    ICaps with Lutein and Zeaxan oral tablet  -- 1 tab(s) by mouth once a day  -- Indication: For home meds     Vitamin D3 2000 intl units oral capsule  -- 1 cap(s) by mouth 2 times a day  -- Indication: For Supplement I will START or STAY ON the medications listed below when I get home from the hospital:    aspirin 81 mg oral delayed release tablet  -- 1 tab(s) by mouth once a day  -- Indication: For Cad    Zetia 10 mg oral tablet  -- 1 tab(s) by mouth once a day  -- Indication: For Cad    raNITIdine 150 mg oral capsule  -- 1 cap(s) by mouth 2 times a day  -- Indication: For Allergy    MiraLax oral powder for reconstitution  -- 1 gram(s) by mouth once a day   -- Dilute this medication with liquid before administration.  It is very important that you take or use this exactly as directed.  Do not skip doses or discontinue unless directed by your doctor.    -- Indication: For Bowel regimen     midodrine 5 mg oral tablet  -- 1 tab(s) by mouth 2 times a day  -- Indication: For Blood pressure     Nuedexta 20 mg-10 mg oral capsule  -- 1 cap(s) by mouth every 12 hours  -- Indication: For Speudobulbulbar affect    ICaps with Lutein and Zeaxan oral tablet  -- 1 tab(s) by mouth once a day  -- Indication: For home meds     Vitamin D3 2000 intl units oral capsule  -- 1 cap(s) by mouth 2 times a day  -- Indication: For Supplement

## 2017-09-28 NOTE — DISCHARGE NOTE ADULT - PROVIDER TOKENS
"Chief Complaint   Patient presents with     Bladder Problems   urinary frequency, burning, blood in urine, dysuria x2 days    Initial /60 (BP Location: Right arm, Cuff Size: Adult Regular)  Pulse 88  Wt 116 lb (52.6 kg)  SpO2 98%  BMI 20.55 kg/m2 Estimated body mass index is 20.55 kg/(m^2) as calculated from the following:    Height as of 1/3/17: 5' 3\" (1.6 m).    Weight as of this encounter: 116 lb (52.6 kg).  Medication Reconciliation: complete   Kortney Hussein MA      " YOLANDE:'[id number]'

## 2017-09-28 NOTE — DISCHARGE NOTE ADULT - CARE PLAN
Principal Discharge DX:	Bradycardia  Goal:	Resolved  Instructions for follow-up, activity and diet:	Follow with PMD  Take meds as directed  Secondary Diagnosis:	Frequent falls  Secondary Diagnosis:	Alzheimer disease  Instructions for follow-up, activity and diet:	Take meds as directed

## 2017-09-28 NOTE — DISCHARGE NOTE ADULT - CARE PROVIDER_API CALL
Daren Campos), Internal Medicine; Neurology  [address] [place] [address]  Phone: [phone]  Fax: [phone]

## 2017-09-28 NOTE — DISCHARGE NOTE ADULT - HOSPITAL COURSE
1. Frequent falls & Bradycardia  82 y/o m hx htn, hld, Parkinsonism, AD type dementia with pseudobulbar affect  MRI 1. Frequent falls & Bradycardia  82 y/o m hx htn, hld, Parkinsonism, AD type dementia with pseudobulbar affect  Physical Therapy     Fall precautions enhanced supervision   Optimize BP  Avoid delirium provoking agents  Nudexta for pseudobulbar affect associated with mixed AD/vascular dementia    Avoid acetylcholinesterase inhibitors (aka donepezil) with bradycardia  PT stable   Disposition to Rehab  Follow up with Dr Campos Neurologu 1. Frequent falls & Bradycardia  82 y/o m hx htn, hld, Parkinsonism, AD type dementia with pseudobulbar affect  Physical Therapy     Fall precautions enhanced supervision   Optimize BP  Avoid delirium provoking agents  Nudexta for pseudobulbar affect associated with mixed AD/vascular dementia    Avoid acetylcholinesterase inhibitors (aka donepezil) with bradycardia  PT stable   Disposition to Rehab  Follow up with Dr Campos Neurology

## 2017-09-28 NOTE — PROGRESS NOTE ADULT - ASSESSMENT
Parkinsonism    Bradycardia asymptomatic  AD type dementia with pseudobulbar affect   multiple falls    continue current care  PT as tolerated  monitor pt for falls  d/c planning to rehab

## 2017-09-28 NOTE — PROGRESS NOTE ADULT - SUBJECTIVE AND OBJECTIVE BOX
MEDICINE, PROGRESS NOTE [phone]    EVELIN CHAVEZ 83y [id number]    Patient seen and examined.  Patient is a 83y old  Male who presents with a chief complaint of Multiple falls at home (28 Sep 2017 09:38)  Pt has no new complaints.    PAST MEDICAL & SURGICAL HISTORY:  Alzheimer disease: with psuedobulbular affect  BPH (benign prostatic hypertrophy)  Renal calculi  Hyperlipidemia  GERD (gastroesophageal reflux disease)  Nephrolithiasis  H/O cataract extraction, right  S/P tonsillectomy    MEDICATIONS  (STANDING):  aspirin enteric coated 81 milliGRAM(s) Oral daily  Nuedexta 20/10mg 1 Capsule(s) 1 Capsule(s) Oral every 12 hours  midodrine 5 milliGRAM(s) Oral two times a day  famotidine    Tablet 20 milliGRAM(s) Oral two times a day  heparin  Injectable 5000 Unit(s) SubCutaneous every 12 hours  ezetimibe 10 milliGRAM(s) Oral daily    MEDICATIONS  (PRN):    Allergies    IV dye (Rash)  penicillin (Rash)    Intolerances        PHYSICAL EXAM:  Constitutional: NAD  HEENT: Normocephalic, EOMI  Neck:  No JVD  Respiratory: CTA B/L, No wheezes  Cardiovascular: S1, S2, RRR, + systolic murmur  Gastrointestinal: BS+, soft, NT/ND  Extremities: No peripheral edema  Neurological: AAOX3, no focal deficits  Psychiatric: Normal mood, normal affect  : No Barone    Vital Signs Last 24 Hrs  T(C): 36.7 (28 Sep 2017 04:00), Max: 36.7 (28 Sep 2017 04:00)  T(F): 98.1 (28 Sep 2017 04:00), Max: 98.1 (28 Sep 2017 04:00)  HR: 59 (28 Sep 2017 04:00) (59 - 77)  BP: 143/71 (28 Sep 2017 04:00) (112/73 - 143/71)  BP(mean): --  RR: 18 (28 Sep 2017 04:00) (18 - 18)  SpO2: 95% (28 Sep 2017 04:00) (95% - 95%)  I&O's Summary    27 Sep 2017 07:01  -  28 Sep 2017 07:00  --------------------------------------------------------  IN: 1080 mL / OUT: 825 mL / NET: 255 mL    28 Sep 2017 07:01  -  28 Sep 2017 13:54  --------------------------------------------------------  IN: 240 mL / OUT: 300 mL / NET: -60 mL        LABS:                    REVIEW OF SYSTEMS:      RADIOLOGY & ADDITIONAL STUDIES:      ASSESSMENT/PLAN:

## 2017-09-29 VITALS
SYSTOLIC BLOOD PRESSURE: 114 MMHG | DIASTOLIC BLOOD PRESSURE: 72 MMHG | OXYGEN SATURATION: 96 % | RESPIRATION RATE: 18 BRPM | TEMPERATURE: 98 F | HEART RATE: 64 BPM

## 2017-09-29 LAB
HCT VFR BLD CALC: 46.7 % — SIGNIFICANT CHANGE UP (ref 39–50)
HGB BLD-MCNC: 15.4 G/DL — SIGNIFICANT CHANGE UP (ref 13–17)
MCHC RBC-ENTMCNC: 32.9 GM/DL — SIGNIFICANT CHANGE UP (ref 32–36)
MCHC RBC-ENTMCNC: 34.2 PG — HIGH (ref 27–34)
MCV RBC AUTO: 104 FL — HIGH (ref 80–100)
PLATELET # BLD AUTO: 199 K/UL — SIGNIFICANT CHANGE UP (ref 150–400)
RBC # BLD: 4.5 M/UL — SIGNIFICANT CHANGE UP (ref 4.2–5.8)
RBC # FLD: 12 % — SIGNIFICANT CHANGE UP (ref 10.3–14.5)
WBC # BLD: 8 K/UL — SIGNIFICANT CHANGE UP (ref 3.8–10.5)
WBC # FLD AUTO: 8 K/UL — SIGNIFICANT CHANGE UP (ref 3.8–10.5)

## 2017-09-29 RX ORDER — POLYETHYLENE GLYCOL 3350 17 G/17G
1 POWDER, FOR SOLUTION ORAL
Qty: 30 | Refills: 0 | OUTPATIENT
Start: 2017-09-29 | End: 2017-10-29

## 2017-09-29 RX ADMIN — HEPARIN SODIUM 5000 UNIT(S): 5000 INJECTION INTRAVENOUS; SUBCUTANEOUS at 06:10

## 2017-09-29 RX ADMIN — MIDODRINE HYDROCHLORIDE 5 MILLIGRAM(S): 2.5 TABLET ORAL at 06:10

## 2017-09-29 RX ADMIN — EZETIMIBE 10 MILLIGRAM(S): 10 TABLET ORAL at 13:12

## 2017-09-29 RX ADMIN — FAMOTIDINE 20 MILLIGRAM(S): 10 INJECTION INTRAVENOUS at 06:10

## 2017-09-29 RX ADMIN — Medication 81 MILLIGRAM(S): at 13:12

## 2017-09-29 NOTE — PROGRESS NOTE ADULT - SUBJECTIVE AND OBJECTIVE BOX
MEDICINE, PROGRESS NOTE [phone]    EVELIN CHAVEZ 83y [id number]    Patient seen and examined.  Patient is a 83y old  Male who presents with a chief complaint of Multiple falls at home (28 Sep 2017 09:38)  Pt sitting in chair comfortable. Pt states he has no bm.    PAST MEDICAL & SURGICAL HISTORY:  Alzheimer disease: with psuedobulbular affect  BPH (benign prostatic hypertrophy)  Renal calculi  Hyperlipidemia  GERD (gastroesophageal reflux disease)  Nephrolithiasis  H/O cataract extraction, right  S/P tonsillectomy    MEDICATIONS  (STANDING):  aspirin enteric coated 81 milliGRAM(s) Oral daily  Nuedexta 20/10mg 1 Capsule(s) 1 Capsule(s) Oral every 12 hours  midodrine 5 milliGRAM(s) Oral two times a day  famotidine    Tablet 20 milliGRAM(s) Oral two times a day  heparin  Injectable 5000 Unit(s) SubCutaneous every 12 hours  ezetimibe 10 milliGRAM(s) Oral daily    MEDICATIONS  (PRN):    Allergies    IV dye (Rash)  penicillin (Rash)    Intolerances        PHYSICAL EXAM:  Constitutional: NAD  HEENT: Normocephalic, EOMI  Neck:  No JVD  Respiratory: CTA B/L, No wheezes  Cardiovascular: S1, S2, RRR, + systolic murmur  Gastrointestinal: BS+, soft, NT/ND  Extremities: No peripheral edema  Neurological: AAOX3, no focal deficits  Psychiatric: Normal mood, normal affect  : No Barone    Vital Signs Last 24 Hrs  T(C): 36.9 (29 Sep 2017 13:16), Max: 36.9 (29 Sep 2017 13:16)  T(F): 98.4 (29 Sep 2017 13:16), Max: 98.4 (29 Sep 2017 13:16)  HR: 64 (29 Sep 2017 13:16) (52 - 90)  BP: 114/72 (29 Sep 2017 13:16) (114/72 - 135/73)  BP(mean): --  RR: 18 (29 Sep 2017 13:16) (18 - 18)  SpO2: 96% (29 Sep 2017 13:16) (94% - 96%)  I&O's Summary    28 Sep 2017 07:01  -  29 Sep 2017 07:00  --------------------------------------------------------  IN: 720 mL / OUT: 900 mL / NET: -180 mL    29 Sep 2017 07:01  -  29 Sep 2017 16:01  --------------------------------------------------------  IN: 640 mL / OUT: 300 mL / NET: 340 mL        LABS:                        15.4   8.0   )-----------( 199      ( 29 Sep 2017 11:20 )             46.7                     REVIEW OF SYSTEMS:      RADIOLOGY & ADDITIONAL STUDIES:      ASSESSMENT/PLAN:

## 2017-09-29 NOTE — PROGRESS NOTE ADULT - ASSESSMENT
asymptomatic bradycardia  AD type dementia with pseudobulbar affect  multiple falls  parkinsonism    continue current care  may d/c to rehab on miralax daily   pt to f/u outpt after rehab

## 2017-09-29 NOTE — CHART NOTE - NSCHARTNOTEFT_GEN_A_CORE
Request from Dr Tafoya  to facilitate patient discharge home today.  Medication reconciliation reviewed, revised, and resolved with Dr Tafoya  who has medically cleared patient for discharge and follow ups as advised.  Patient's family who states understanding of discharge instruction and follow ups.  Please refer to discharge note for detailed hospital course.   Plan discussed with consults  who agrees and patient will follow up with Dr Campos neurologists out patient   Medicine NP SAUL Brown [phone]

## 2017-10-02 ENCOUNTER — EMERGENCY (EMERGENCY)
Facility: HOSPITAL | Age: 82
LOS: 1 days | Discharge: HOME | End: 2017-10-02
Attending: EMERGENCY MEDICINE | Admitting: EMERGENCY MEDICINE
Payer: MEDICARE

## 2017-10-02 VITALS
HEART RATE: 76 BPM | SYSTOLIC BLOOD PRESSURE: 112 MMHG | OXYGEN SATURATION: 94 % | RESPIRATION RATE: 18 BRPM | DIASTOLIC BLOOD PRESSURE: 70 MMHG

## 2017-10-02 DIAGNOSIS — Z98.89 OTHER SPECIFIED POSTPROCEDURAL STATES: Chronic | ICD-10-CM

## 2017-10-02 DIAGNOSIS — N20.0 CALCULUS OF KIDNEY: Chronic | ICD-10-CM

## 2017-10-02 PROCEDURE — 72125 CT NECK SPINE W/O DYE: CPT | Mod: 26

## 2017-10-02 PROCEDURE — 70450 CT HEAD/BRAIN W/O DYE: CPT | Mod: 26

## 2017-10-02 PROCEDURE — 72125 CT NECK SPINE W/O DYE: CPT

## 2017-10-02 PROCEDURE — 99284 EMERGENCY DEPT VISIT MOD MDM: CPT | Mod: 25

## 2017-10-02 PROCEDURE — 99284 EMERGENCY DEPT VISIT MOD MDM: CPT | Mod: GC

## 2017-10-02 PROCEDURE — 70450 CT HEAD/BRAIN W/O DYE: CPT

## 2017-10-02 NOTE — CHART NOTE - NSCHARTNOTEFT_GEN_A_CORE
Patient with fall at SNF, asked to interrogate loop recorder.   Patient with multiple 3-4 seconds pauses in last 2 weeks. No events today correlating with patient's fall.  Recommendations per patient's primary cardiologist.

## 2017-10-02 NOTE — ED ADULT NURSE NOTE - OBJECTIVE STATEMENT
82 y/o male hx of Alzheimer, CAD BIBEMS from nursing home s/p fall at the facility. As per pt he was reaching for something and fell on a food tray. Sustained abrasion to middled forehead, denies LOC, on coumadin. Denies any kind of pain at this time. Pt is alert & orientedx3, PERRL, no chest pain, no SOB, no nausea or vomiting. Pt walks with a walker. Safety maintained & continue monitor

## 2017-10-02 NOTE — ED PROVIDER NOTE - PROGRESS NOTE DETAILS
Loop recorder was investigated and showed no recent abnormal events. CT head and neck results were unremarkable. Pt is stable and read to be transferred back to rehab center.

## 2017-10-02 NOTE — ED PROVIDER NOTE - OBJECTIVE STATEMENT
84 yo M w/ pmhx of Dementia, HLD, Hypotension, currently in rehab for multiple falls, BIBA from rehab s/p fall today. Pt was on bed, rolled over and hit his head on corner of the table which has metal stand. Pt suffered a laceration to the forehead. Pt is not able to give much details. Pt is on ASA, no other blood thinners. denies fever, chills, pain. 82 yo M w/ pmhx of Dementia, HLD, Hypotension, currently in rehab for multiple falls, BIBA from rehab s/p fall today around 4:30pm. Pt was on his bed, rolled over and hit his head on corner of the table which has metal stand. Pt suffered a laceration to the forehead. Pt denies LOC, headache, blurry vision,  Pt is on ASA, no other blood thinners. denies fever, chills, pain. Denies hx of seizures. Pt has a cardiac loop recorder, hx of multiples falls, hence the rehab.

## 2017-10-02 NOTE — ED PROVIDER NOTE - ATTENDING CONTRIBUTION TO CARE
I have seen and evaluated this patient with the resident.   I agree with the findings  unless other wise stated.  I have made appropriate changes in documentations where needed, After my face to face bedside evaluation, I am further  noting:  pt with dementia mechanical withnessed fall no sz no external bleeding no extremities deformity normal active and passive movement of major joints -- Gray I have seen and evaluated this patient with the resident.   I agree with the findings  unless other wise stated.  I have made appropriate changes in documentations where needed, After my face to face bedside evaluation, I am further  noting:  pt with dementia mechanical withnessed fall no sz no external bleeding no extremities deformity normal active and passive movement of major joints --had loop recorder interrogated no event with fall --  Gray

## 2017-10-03 VITALS
HEART RATE: 60 BPM | OXYGEN SATURATION: 97 % | RESPIRATION RATE: 18 BRPM | DIASTOLIC BLOOD PRESSURE: 67 MMHG | SYSTOLIC BLOOD PRESSURE: 127 MMHG

## 2017-10-19 PROCEDURE — 82435 ASSAY OF BLOOD CHLORIDE: CPT

## 2017-10-19 PROCEDURE — 99285 EMERGENCY DEPT VISIT HI MDM: CPT | Mod: 25

## 2017-10-19 PROCEDURE — 71045 X-RAY EXAM CHEST 1 VIEW: CPT

## 2017-10-19 PROCEDURE — 70450 CT HEAD/BRAIN W/O DYE: CPT

## 2017-10-19 PROCEDURE — 82550 ASSAY OF CK (CPK): CPT

## 2017-10-19 PROCEDURE — 93005 ELECTROCARDIOGRAM TRACING: CPT

## 2017-10-19 PROCEDURE — 82947 ASSAY GLUCOSE BLOOD QUANT: CPT

## 2017-10-19 PROCEDURE — 97110 THERAPEUTIC EXERCISES: CPT

## 2017-10-19 PROCEDURE — 97161 PT EVAL LOW COMPLEX 20 MIN: CPT

## 2017-10-19 PROCEDURE — 80053 COMPREHEN METABOLIC PANEL: CPT

## 2017-10-19 PROCEDURE — 85027 COMPLETE CBC AUTOMATED: CPT

## 2017-10-19 PROCEDURE — 84100 ASSAY OF PHOSPHORUS: CPT

## 2017-10-19 PROCEDURE — 83735 ASSAY OF MAGNESIUM: CPT

## 2017-10-19 PROCEDURE — 84132 ASSAY OF SERUM POTASSIUM: CPT

## 2017-10-19 PROCEDURE — 85610 PROTHROMBIN TIME: CPT

## 2017-10-19 PROCEDURE — 83605 ASSAY OF LACTIC ACID: CPT

## 2017-10-19 PROCEDURE — 85730 THROMBOPLASTIN TIME PARTIAL: CPT

## 2017-10-19 PROCEDURE — 84295 ASSAY OF SERUM SODIUM: CPT

## 2017-10-19 PROCEDURE — 85014 HEMATOCRIT: CPT

## 2017-10-19 PROCEDURE — 80048 BASIC METABOLIC PNL TOTAL CA: CPT

## 2017-10-19 PROCEDURE — 84484 ASSAY OF TROPONIN QUANT: CPT

## 2017-10-19 PROCEDURE — 82330 ASSAY OF CALCIUM: CPT

## 2017-10-19 PROCEDURE — 82803 BLOOD GASES ANY COMBINATION: CPT

## 2017-10-19 PROCEDURE — 97116 GAIT TRAINING THERAPY: CPT

## 2017-10-24 ENCOUNTER — EMERGENCY (EMERGENCY)
Facility: HOSPITAL | Age: 82
LOS: 1 days | Discharge: HOME | End: 2017-10-24
Attending: EMERGENCY MEDICINE | Admitting: EMERGENCY MEDICINE
Payer: MEDICARE

## 2017-10-24 VITALS
HEART RATE: 73 BPM | WEIGHT: 169.98 LBS | SYSTOLIC BLOOD PRESSURE: 108 MMHG | OXYGEN SATURATION: 96 % | DIASTOLIC BLOOD PRESSURE: 74 MMHG | TEMPERATURE: 99 F | RESPIRATION RATE: 20 BRPM

## 2017-10-24 VITALS
SYSTOLIC BLOOD PRESSURE: 128 MMHG | OXYGEN SATURATION: 100 % | HEART RATE: 75 BPM | TEMPERATURE: 98 F | DIASTOLIC BLOOD PRESSURE: 87 MMHG | RESPIRATION RATE: 17 BRPM

## 2017-10-24 DIAGNOSIS — N20.0 CALCULUS OF KIDNEY: Chronic | ICD-10-CM

## 2017-10-24 DIAGNOSIS — Z98.89 OTHER SPECIFIED POSTPROCEDURAL STATES: Chronic | ICD-10-CM

## 2017-10-24 LAB
ALBUMIN SERPL ELPH-MCNC: 3.6 G/DL — SIGNIFICANT CHANGE UP (ref 3.3–5)
ALP SERPL-CCNC: 87 U/L — SIGNIFICANT CHANGE UP (ref 40–120)
ALT FLD-CCNC: 28 U/L RC — SIGNIFICANT CHANGE UP (ref 10–45)
ANION GAP SERPL CALC-SCNC: 13 MMOL/L — SIGNIFICANT CHANGE UP (ref 5–17)
APPEARANCE UR: CLEAR — SIGNIFICANT CHANGE UP
APTT BLD: 32.3 SEC — SIGNIFICANT CHANGE UP (ref 27.5–37.4)
AST SERPL-CCNC: 41 U/L — HIGH (ref 10–40)
BASE EXCESS BLDV CALC-SCNC: 2.9 MMOL/L — HIGH (ref -2–2)
BASOPHILS # BLD AUTO: 0 K/UL — SIGNIFICANT CHANGE UP (ref 0–0.2)
BASOPHILS # BLD AUTO: 0 K/UL — SIGNIFICANT CHANGE UP (ref 0–0.2)
BASOPHILS NFR BLD AUTO: 0.4 % — SIGNIFICANT CHANGE UP (ref 0–2)
BASOPHILS NFR BLD AUTO: 0.5 % — SIGNIFICANT CHANGE UP (ref 0–2)
BILIRUB SERPL-MCNC: 0.5 MG/DL — SIGNIFICANT CHANGE UP (ref 0.2–1.2)
BILIRUB UR-MCNC: NEGATIVE — SIGNIFICANT CHANGE UP
BLD GP AB SCN SERPL QL: NEGATIVE — SIGNIFICANT CHANGE UP
BUN SERPL-MCNC: 19 MG/DL — SIGNIFICANT CHANGE UP (ref 7–23)
CA-I SERPL-SCNC: 1.29 MMOL/L — SIGNIFICANT CHANGE UP (ref 1.12–1.3)
CALCIUM SERPL-MCNC: 9.6 MG/DL — SIGNIFICANT CHANGE UP (ref 8.4–10.5)
CHLORIDE BLDV-SCNC: 106 MMOL/L — SIGNIFICANT CHANGE UP (ref 96–108)
CHLORIDE SERPL-SCNC: 103 MMOL/L — SIGNIFICANT CHANGE UP (ref 96–108)
CO2 BLDV-SCNC: 30 MMOL/L — SIGNIFICANT CHANGE UP (ref 22–30)
CO2 SERPL-SCNC: 25 MMOL/L — SIGNIFICANT CHANGE UP (ref 22–31)
COLOR SPEC: YELLOW — SIGNIFICANT CHANGE UP
CREAT SERPL-MCNC: 1.02 MG/DL — SIGNIFICANT CHANGE UP (ref 0.5–1.3)
DIFF PNL FLD: NEGATIVE — SIGNIFICANT CHANGE UP
EOSINOPHIL # BLD AUTO: 0 K/UL — SIGNIFICANT CHANGE UP (ref 0–0.5)
EOSINOPHIL # BLD AUTO: 0.1 K/UL — SIGNIFICANT CHANGE UP (ref 0–0.5)
EOSINOPHIL NFR BLD AUTO: 0.4 % — SIGNIFICANT CHANGE UP (ref 0–6)
EOSINOPHIL NFR BLD AUTO: 0.8 % — SIGNIFICANT CHANGE UP (ref 0–6)
GAS PNL BLDV: 140 MMOL/L — SIGNIFICANT CHANGE UP (ref 136–145)
GAS PNL BLDV: SIGNIFICANT CHANGE UP
GAS PNL BLDV: SIGNIFICANT CHANGE UP
GLUCOSE BLDV-MCNC: 88 MG/DL — SIGNIFICANT CHANGE UP (ref 70–99)
GLUCOSE SERPL-MCNC: 83 MG/DL — SIGNIFICANT CHANGE UP (ref 70–99)
GLUCOSE UR QL: NEGATIVE — SIGNIFICANT CHANGE UP
HCO3 BLDV-SCNC: 28 MMOL/L — SIGNIFICANT CHANGE UP (ref 21–29)
HCT VFR BLD CALC: 41.6 % — SIGNIFICANT CHANGE UP (ref 39–50)
HCT VFR BLD CALC: 41.9 % — SIGNIFICANT CHANGE UP (ref 39–50)
HCT VFR BLDA CALC: 45 % — SIGNIFICANT CHANGE UP (ref 39–50)
HGB BLD CALC-MCNC: 14.7 G/DL — SIGNIFICANT CHANGE UP (ref 13–17)
HGB BLD-MCNC: 14.2 G/DL — SIGNIFICANT CHANGE UP (ref 13–17)
HGB BLD-MCNC: 14.3 G/DL — SIGNIFICANT CHANGE UP (ref 13–17)
INR BLD: 1.08 RATIO — SIGNIFICANT CHANGE UP (ref 0.88–1.16)
KETONES UR-MCNC: NEGATIVE — SIGNIFICANT CHANGE UP
LACTATE BLDV-MCNC: 1.5 MMOL/L — SIGNIFICANT CHANGE UP (ref 0.7–2)
LEUKOCYTE ESTERASE UR-ACNC: NEGATIVE — SIGNIFICANT CHANGE UP
LYMPHOCYTES # BLD AUTO: 1.9 K/UL — SIGNIFICANT CHANGE UP (ref 1–3.3)
LYMPHOCYTES # BLD AUTO: 1.9 K/UL — SIGNIFICANT CHANGE UP (ref 1–3.3)
LYMPHOCYTES # BLD AUTO: 22.7 % — SIGNIFICANT CHANGE UP (ref 13–44)
LYMPHOCYTES # BLD AUTO: 24.6 % — SIGNIFICANT CHANGE UP (ref 13–44)
MCHC RBC-ENTMCNC: 34.1 GM/DL — SIGNIFICANT CHANGE UP (ref 32–36)
MCHC RBC-ENTMCNC: 34.2 GM/DL — SIGNIFICANT CHANGE UP (ref 32–36)
MCHC RBC-ENTMCNC: 35.5 PG — HIGH (ref 27–34)
MCHC RBC-ENTMCNC: 35.7 PG — HIGH (ref 27–34)
MCV RBC AUTO: 104 FL — HIGH (ref 80–100)
MCV RBC AUTO: 105 FL — HIGH (ref 80–100)
MONOCYTES # BLD AUTO: 0.5 K/UL — SIGNIFICANT CHANGE UP (ref 0–0.9)
MONOCYTES # BLD AUTO: 0.8 K/UL — SIGNIFICANT CHANGE UP (ref 0–0.9)
MONOCYTES NFR BLD AUTO: 6.1 % — SIGNIFICANT CHANGE UP (ref 2–14)
MONOCYTES NFR BLD AUTO: 9.8 % — SIGNIFICANT CHANGE UP (ref 2–14)
NEUTROPHILS # BLD AUTO: 5 K/UL — SIGNIFICANT CHANGE UP (ref 1.8–7.4)
NEUTROPHILS # BLD AUTO: 5.7 K/UL — SIGNIFICANT CHANGE UP (ref 1.8–7.4)
NEUTROPHILS NFR BLD AUTO: 64.7 % — SIGNIFICANT CHANGE UP (ref 43–77)
NEUTROPHILS NFR BLD AUTO: 70 % — SIGNIFICANT CHANGE UP (ref 43–77)
NITRITE UR-MCNC: NEGATIVE — SIGNIFICANT CHANGE UP
OTHER CELLS CSF MANUAL: 14 ML/DL — LOW (ref 18–22)
PCO2 BLDV: 48 MMHG — SIGNIFICANT CHANGE UP (ref 35–50)
PH BLDV: 7.39 — SIGNIFICANT CHANGE UP (ref 7.35–7.45)
PH UR: 6 — SIGNIFICANT CHANGE UP (ref 5–8)
PLATELET # BLD AUTO: 178 K/UL — SIGNIFICANT CHANGE UP (ref 150–400)
PLATELET # BLD AUTO: 201 K/UL — SIGNIFICANT CHANGE UP (ref 150–400)
PO2 BLDV: 40 MMHG — SIGNIFICANT CHANGE UP (ref 25–45)
POTASSIUM BLDV-SCNC: 4 MMOL/L — SIGNIFICANT CHANGE UP (ref 3.5–5)
POTASSIUM SERPL-MCNC: 5.1 MMOL/L — SIGNIFICANT CHANGE UP (ref 3.5–5.3)
POTASSIUM SERPL-SCNC: 5.1 MMOL/L — SIGNIFICANT CHANGE UP (ref 3.5–5.3)
PROT SERPL-MCNC: 7.5 G/DL — SIGNIFICANT CHANGE UP (ref 6–8.3)
PROT UR-MCNC: NEGATIVE — SIGNIFICANT CHANGE UP
PROTHROM AB SERPL-ACNC: 11.8 SEC — SIGNIFICANT CHANGE UP (ref 9.8–12.7)
RBC # BLD: 3.98 M/UL — LOW (ref 4.2–5.8)
RBC # BLD: 4.04 M/UL — LOW (ref 4.2–5.8)
RBC # FLD: 11.7 % — SIGNIFICANT CHANGE UP (ref 10.3–14.5)
RBC # FLD: 11.7 % — SIGNIFICANT CHANGE UP (ref 10.3–14.5)
RH IG SCN BLD-IMP: POSITIVE — SIGNIFICANT CHANGE UP
SAO2 % BLDV: 69 % — SIGNIFICANT CHANGE UP (ref 67–88)
SODIUM SERPL-SCNC: 141 MMOL/L — SIGNIFICANT CHANGE UP (ref 135–145)
SP GR SPEC: 1.02 — SIGNIFICANT CHANGE UP (ref 1.01–1.02)
UROBILINOGEN FLD QL: NEGATIVE — SIGNIFICANT CHANGE UP
WBC # BLD: 7.8 K/UL — SIGNIFICANT CHANGE UP (ref 3.8–10.5)
WBC # BLD: 8.2 K/UL — SIGNIFICANT CHANGE UP (ref 3.8–10.5)
WBC # FLD AUTO: 7.8 K/UL — SIGNIFICANT CHANGE UP (ref 3.8–10.5)
WBC # FLD AUTO: 8.2 K/UL — SIGNIFICANT CHANGE UP (ref 3.8–10.5)

## 2017-10-24 PROCEDURE — 99284 EMERGENCY DEPT VISIT MOD MDM: CPT | Mod: GC

## 2017-10-24 RX ORDER — ASPIRIN/CALCIUM CARB/MAGNESIUM 324 MG
1 TABLET ORAL
Qty: 0 | Refills: 0 | COMMUNITY

## 2017-10-24 RX ORDER — SODIUM CHLORIDE 9 MG/ML
1000 INJECTION INTRAMUSCULAR; INTRAVENOUS; SUBCUTANEOUS
Qty: 0 | Refills: 0 | Status: DISCONTINUED | OUTPATIENT
Start: 2017-10-24 | End: 2017-10-28

## 2017-10-24 RX ORDER — PHENYLEPHRINE-SHARK LIVER OIL-MINERAL OIL-PETROLATUM RECTAL OINTMENT
1 OINTMENT (GRAM) RECTAL
Qty: 1 | Refills: 0 | OUTPATIENT
Start: 2017-10-24

## 2017-10-24 RX ADMIN — SODIUM CHLORIDE 50 MILLILITER(S): 9 INJECTION INTRAMUSCULAR; INTRAVENOUS; SUBCUTANEOUS at 14:37

## 2017-10-24 NOTE — ED PROVIDER NOTE - OBJECTIVE STATEMENT
82 yo M with h/o mild Alzheimer's Dementia, BPH, GERD, HLD, kidney stones, bradycardia, and recent admission (9/19-9/29) at [facility] for frequent falls p/w 1 episode of rectal bleeding this morning. Pt was at rehab following his recent admission at [facility] and returned home last Thursday. Per wife, pt was in his usual state of health upon returning home from rehab, with no problems ambulating. However, this morning, pt had an episode of BRBPR, filling the toilet bowl with bright red blood, no clots, when having a BM. One hard stool in toilet bowl with blood. Never had BRBPR in the past. Per wife, pt might have had small drops of blood on toilet tissue when wiping in the past, but nothing recent. Had colonoscopy many years ago and normal per wife. No EGD. Not on any AC but on baby ASA. Denies any abd pain, rectal pain, N/V, diarrhea, constipation, CP, SOB, lightheadedness/dizziness, palpitations.

## 2017-10-24 NOTE — ED PROVIDER NOTE - PLAN OF CARE
Your hemoglobin in the Emergency Room remained stable, and you had no further episodes Your hemoglobin in the Emergency Room remained stable, and you had no further episodes of rectal bleeding while in the Emergency Room. External hemorrhoids were seen on exam. Please follow up with your primary care doctor soon after discharge and apply Preparation H as needed. If you have any episodes of bleeding or dark stools or develop any new symptoms, please seek immediate medical care.

## 2017-10-24 NOTE — ED PROVIDER NOTE - MEDICAL DECISION MAKING DETAILS
h/o mild Alzheimer's Dementia, BPH, GERD, HLD, kidney stones, bradycardia p/w 1 episode of BRBPR this morning. Stool guaiac positive, no gross blood in anal area or rectal blood. Will check CBC, monitor for any additional episodes. h/o mild Alzheimer's Dementia, BPH, GERD, HLD, kidney stones, bradycardia p/w 1 episode of BRBPR this morning. Stool guaiac positive, no gross blood in anal area or rectal blood. Will check CBC, monitor for any additional episodes. likely hemorrhoidal bleeding.

## 2017-10-24 NOTE — ED PROVIDER NOTE - PROGRESS NOTE DETAILS
Patient was endorsed to me by Dr. Cantrell     at  3    to follow up results of   repeat h/h     and dispo pending these results and re evaluation. Upon my re evaluation of the patient I found that h/h normal and stable - pt feeling well no cp sob ha dizziness no symptoms- likely from hemorrhoid as per signout - will d/c with follow up. Elvis Patrick M.D., Attending Physician

## 2017-10-24 NOTE — ED PROVIDER NOTE - CARE PLAN
Principal Discharge DX:	Rectal bleeding  Instructions for follow-up, activity and diet:	Your hemoglobin in the Emergency Room remained stable, and you had no further episodes Principal Discharge DX:	Rectal bleeding  Instructions for follow-up, activity and diet:	Your hemoglobin in the Emergency Room remained stable, and you had no further episodes of rectal bleeding while in the Emergency Room. External hemorrhoids were seen on exam. Please follow up with your primary care doctor soon after discharge and apply Preparation H as needed. If you have any episodes of bleeding or dark stools or develop any new symptoms, please seek immediate medical care.

## 2017-10-24 NOTE — ED ADULT NURSE NOTE - OBJECTIVE STATEMENT
82 yo male presents to the ED from home c/o rectal bleeding this AM. patient wife states patient was d/c from hospital for weakness/stroke and sent to rehab and d/c from rehab thursday. patient wife states patient went to have a BM this AM and noticed "a bowel filled with bright red blood" with a pebble of stool. patient is AAOx3. lung sounds clear bilaterally. cap refill <3sec. abdomen soft, non-tender, distended. patient on asa 81mg daily. patient denies fevers, chills, N/V/D, HA, dizziness, abdominal pain, dysuria. family states patient had last normal BM two days ago. VSS.  GUIAC performed by MD with RN at bedside and results are positive. skin intact. redness to buttocks. patient ambulates with walker at home as per wife. 82 yo male presents to the ED from home with PMH  of mild Alzheimer's Dementia, BPH, GERD, HLD, kidney stones, bradycardia, and recent admission (9/19-9/29) at [facility] for frequent falls c/o rectal bleeding this AM. patient wife states patient went to have a BM this AM and noticed "a bowel filled with bright red blood" with a pebble of stool. patient is AAOx3. lung sounds clear bilaterally. cap refill <3sec. abdomen soft, non-tender, distended. patient on asa 81mg daily. patient denies fevers, chills, N/V/D, HA, dizziness, abdominal pain, dysuria. family states patient had last normal BM two days ago. VSS.  GUIAC performed by MD with RN at bedside and results are positive. skin intact. redness to buttocks. patient ambulates with walker at home as per wife.

## 2017-11-01 ENCOUNTER — OUTPATIENT (OUTPATIENT)
Dept: OUTPATIENT SERVICES | Facility: HOSPITAL | Age: 82
LOS: 1 days | End: 2017-11-01
Payer: MEDICARE

## 2017-11-01 DIAGNOSIS — N20.0 CALCULUS OF KIDNEY: Chronic | ICD-10-CM

## 2017-11-01 DIAGNOSIS — Z98.89 OTHER SPECIFIED POSTPROCEDURAL STATES: Chronic | ICD-10-CM

## 2017-11-10 ENCOUNTER — INPATIENT (INPATIENT)
Facility: HOSPITAL | Age: 82
LOS: 10 days | Discharge: HOME | DRG: 604 | End: 2017-11-21
Attending: INTERNAL MEDICINE | Admitting: INTERNAL MEDICINE
Payer: MEDICARE

## 2017-11-10 VITALS
RESPIRATION RATE: 20 BRPM | SYSTOLIC BLOOD PRESSURE: 130 MMHG | HEIGHT: 63 IN | OXYGEN SATURATION: 99 % | HEART RATE: 59 BPM | DIASTOLIC BLOOD PRESSURE: 70 MMHG | TEMPERATURE: 98 F
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
## 2017-11-10 DIAGNOSIS — Z98.89 OTHER SPECIFIED POSTPROCEDURAL STATES: Chronic | ICD-10-CM

## 2017-11-10 DIAGNOSIS — N20.0 CALCULUS OF KIDNEY: Chronic | ICD-10-CM

## 2017-11-10 LAB
ALBUMIN SERPL ELPH-MCNC: 4.3 G/DL — SIGNIFICANT CHANGE UP (ref 3.3–5)
ALP SERPL-CCNC: 84 U/L — SIGNIFICANT CHANGE UP (ref 40–120)
ALT FLD-CCNC: 36 U/L RC — SIGNIFICANT CHANGE UP (ref 10–45)
ANION GAP SERPL CALC-SCNC: 14 MMOL/L — SIGNIFICANT CHANGE UP (ref 5–17)
APPEARANCE UR: CLEAR — SIGNIFICANT CHANGE UP
APTT BLD: 25.3 SEC — LOW (ref 27.5–37.4)
AST SERPL-CCNC: 25 U/L — SIGNIFICANT CHANGE UP (ref 10–40)
BASOPHILS # BLD AUTO: 0 K/UL — SIGNIFICANT CHANGE UP (ref 0–0.2)
BASOPHILS NFR BLD AUTO: 0.2 % — SIGNIFICANT CHANGE UP (ref 0–2)
BILIRUB SERPL-MCNC: 0.5 MG/DL — SIGNIFICANT CHANGE UP (ref 0.2–1.2)
BILIRUB UR-MCNC: NEGATIVE — SIGNIFICANT CHANGE UP
BUN SERPL-MCNC: 14 MG/DL — SIGNIFICANT CHANGE UP (ref 7–23)
CALCIUM SERPL-MCNC: 9.5 MG/DL — SIGNIFICANT CHANGE UP (ref 8.4–10.5)
CHLORIDE SERPL-SCNC: 105 MMOL/L — SIGNIFICANT CHANGE UP (ref 96–108)
CK MB CFR SERPL CALC: 4.6 NG/ML — HIGH (ref 0–3.8)
CO2 SERPL-SCNC: 25 MMOL/L — SIGNIFICANT CHANGE UP (ref 22–31)
COLOR SPEC: YELLOW — SIGNIFICANT CHANGE UP
CREAT SERPL-MCNC: 1.04 MG/DL — SIGNIFICANT CHANGE UP (ref 0.5–1.3)
DIFF PNL FLD: NEGATIVE — SIGNIFICANT CHANGE UP
EOSINOPHIL # BLD AUTO: 0.1 K/UL — SIGNIFICANT CHANGE UP (ref 0–0.5)
EOSINOPHIL NFR BLD AUTO: 0.7 % — SIGNIFICANT CHANGE UP (ref 0–6)
GAS PNL BLDV: SIGNIFICANT CHANGE UP
GLUCOSE BLDC GLUCOMTR-MCNC: 96 MG/DL — SIGNIFICANT CHANGE UP (ref 70–99)
GLUCOSE SERPL-MCNC: 102 MG/DL — HIGH (ref 70–99)
GLUCOSE UR QL: NEGATIVE — SIGNIFICANT CHANGE UP
HCT VFR BLD CALC: 43.6 % — SIGNIFICANT CHANGE UP (ref 34.5–45)
HGB BLD-MCNC: 14.2 G/DL — SIGNIFICANT CHANGE UP (ref 11.5–15.5)
INR BLD: 1.13 RATIO — SIGNIFICANT CHANGE UP (ref 0.88–1.16)
KETONES UR-MCNC: NEGATIVE — SIGNIFICANT CHANGE UP
LEUKOCYTE ESTERASE UR-ACNC: NEGATIVE — SIGNIFICANT CHANGE UP
LIDOCAIN IGE QN: 19 U/L — SIGNIFICANT CHANGE UP (ref 7–60)
LYMPHOCYTES # BLD AUTO: 1.4 K/UL — SIGNIFICANT CHANGE UP (ref 1–3.3)
LYMPHOCYTES # BLD AUTO: 14.5 % — SIGNIFICANT CHANGE UP (ref 13–44)
MCHC RBC-ENTMCNC: 32.6 GM/DL — SIGNIFICANT CHANGE UP (ref 32–36)
MCHC RBC-ENTMCNC: 34.3 PG — HIGH (ref 27–34)
MCV RBC AUTO: 105 FL — HIGH (ref 80–100)
MONOCYTES # BLD AUTO: 0.8 K/UL — SIGNIFICANT CHANGE UP (ref 0–0.9)
MONOCYTES NFR BLD AUTO: 8.4 % — SIGNIFICANT CHANGE UP (ref 2–14)
NEUTROPHILS # BLD AUTO: 7.2 K/UL — SIGNIFICANT CHANGE UP (ref 1.8–7.4)
NEUTROPHILS NFR BLD AUTO: 76.3 % — SIGNIFICANT CHANGE UP (ref 43–77)
NITRITE UR-MCNC: NEGATIVE — SIGNIFICANT CHANGE UP
PH UR: 6 — SIGNIFICANT CHANGE UP (ref 5–8)
PLATELET # BLD AUTO: 144 K/UL — LOW (ref 150–400)
POTASSIUM SERPL-MCNC: 4.4 MMOL/L — SIGNIFICANT CHANGE UP (ref 3.5–5.3)
POTASSIUM SERPL-SCNC: 4.4 MMOL/L — SIGNIFICANT CHANGE UP (ref 3.5–5.3)
PROT SERPL-MCNC: 7 G/DL — SIGNIFICANT CHANGE UP (ref 6–8.3)
PROT UR-MCNC: SIGNIFICANT CHANGE UP
PROTHROM AB SERPL-ACNC: 12.3 SEC — SIGNIFICANT CHANGE UP (ref 9.8–12.7)
RBC # BLD: 4.15 M/UL — SIGNIFICANT CHANGE UP (ref 3.8–5.2)
RBC # FLD: 12 % — SIGNIFICANT CHANGE UP (ref 10.3–14.5)
SODIUM SERPL-SCNC: 144 MMOL/L — SIGNIFICANT CHANGE UP (ref 135–145)
SP GR SPEC: 1.02 — SIGNIFICANT CHANGE UP (ref 1.01–1.02)
TROPONIN T SERPL-MCNC: <0.01 NG/ML — SIGNIFICANT CHANGE UP (ref 0–0.06)
UROBILINOGEN FLD QL: NEGATIVE — SIGNIFICANT CHANGE UP
WBC # BLD: 9.4 K/UL — SIGNIFICANT CHANGE UP (ref 3.8–10.5)
WBC # FLD AUTO: 9.4 K/UL — SIGNIFICANT CHANGE UP (ref 3.8–10.5)

## 2017-11-10 PROCEDURE — 72125 CT NECK SPINE W/O DYE: CPT | Mod: 26

## 2017-11-10 PROCEDURE — 93010 ELECTROCARDIOGRAM REPORT: CPT

## 2017-11-10 PROCEDURE — 70450 CT HEAD/BRAIN W/O DYE: CPT | Mod: 26

## 2017-11-10 PROCEDURE — 80053 COMPREHEN METABOLIC PANEL: CPT

## 2017-11-10 PROCEDURE — 82272 OCCULT BLD FECES 1-3 TESTS: CPT

## 2017-11-10 PROCEDURE — 82435 ASSAY OF BLOOD CHLORIDE: CPT

## 2017-11-10 PROCEDURE — 82962 GLUCOSE BLOOD TEST: CPT

## 2017-11-10 PROCEDURE — 82803 BLOOD GASES ANY COMBINATION: CPT

## 2017-11-10 PROCEDURE — 99285 EMERGENCY DEPT VISIT HI MDM: CPT | Mod: 25

## 2017-11-10 PROCEDURE — 86901 BLOOD TYPING SEROLOGIC RH(D): CPT

## 2017-11-10 PROCEDURE — 85014 HEMATOCRIT: CPT

## 2017-11-10 PROCEDURE — 85027 COMPLETE CBC AUTOMATED: CPT

## 2017-11-10 PROCEDURE — 71010: CPT | Mod: 26

## 2017-11-10 PROCEDURE — 99223 1ST HOSP IP/OBS HIGH 75: CPT

## 2017-11-10 PROCEDURE — 82330 ASSAY OF CALCIUM: CPT

## 2017-11-10 PROCEDURE — 84132 ASSAY OF SERUM POTASSIUM: CPT

## 2017-11-10 PROCEDURE — 84295 ASSAY OF SERUM SODIUM: CPT

## 2017-11-10 PROCEDURE — 82947 ASSAY GLUCOSE BLOOD QUANT: CPT

## 2017-11-10 PROCEDURE — 83605 ASSAY OF LACTIC ACID: CPT

## 2017-11-10 PROCEDURE — 81003 URINALYSIS AUTO W/O SCOPE: CPT

## 2017-11-10 PROCEDURE — 86850 RBC ANTIBODY SCREEN: CPT

## 2017-11-10 PROCEDURE — 85730 THROMBOPLASTIN TIME PARTIAL: CPT

## 2017-11-10 PROCEDURE — 86900 BLOOD TYPING SEROLOGIC ABO: CPT

## 2017-11-10 PROCEDURE — 85610 PROTHROMBIN TIME: CPT

## 2017-11-10 PROCEDURE — 99284 EMERGENCY DEPT VISIT MOD MDM: CPT | Mod: 25

## 2017-11-10 NOTE — ED ADULT NURSE REASSESSMENT NOTE - NS ED NURSE REASSESS COMMENT FT1
pt assisted to urinal. urine sent to lab as per md cantu. pt placed in position of comfort. md cantu aware of vitals. safety maintained. family at bedside. will continue to monitor.

## 2017-11-10 NOTE — ED ADULT NURSE NOTE - CHPI ED SYMPTOMS NEG
no tingling/no vomiting/no numbness/no confusion/no deformity/no weakness/no fever/no loss of consciousness/no bleeding

## 2017-11-10 NOTE — ED ADULT NURSE REASSESSMENT NOTE - NS ED NURSE REASSESS COMMENT FT1
pt given warm blankets. placed in position of comfort. safety maintained. family at bedside. will continue to monitor.

## 2017-11-10 NOTE — ED PROVIDER NOTE - MEDICAL DECISION MAKING DETAILS
Attending MD Quinonez: 82 y/o M pt with PMHx of Alzheimer's, BPH, GERD, HLD, multiple s/p loop recorder presenting from home with fall and confusion, patient providing little account as to circumstances of fall and with mild confusion, ddx includes ICH from fall, metabolic or infectious encephalopathy, syncope. Will obtain CT head, C spine, labs, UA, EKG, maintain on tele, likely admission given ongoing encephalopathy and frequent falls, unsafe disposition home

## 2017-11-10 NOTE — ED ADULT NURSE NOTE - OBJECTIVE STATEMENT
82 yo male pt with history of alzheimer's, BPH, GERD, multiple falls presents to ed complaining of fall. as per wife "he falls a lot and was admitted in october for a fall. he was home with my son, my son heard a thud and ran to my  and found him on the floor". laceration noted to left occipital of head, no discharge or bleeding noted. skin tear noted to left elbow. at this time pt aox3, pt denies headache/dizziness/n/v/numbness/tingling. pt's wife denies loc. no arm or leg drift. face symmetrical. speech clear. yan with 5/5 strength. pt normally walks with walker. wife states "his blood pressure has been low lately". breath sounds clear and equal bilaterally. abd nontender and nondistended. pt placed on cardiac monitor NSR HR 56. safety maintained.

## 2017-11-10 NOTE — ED PROVIDER NOTE - CARE PLAN
Principal Discharge DX:	Fall, initial encounter  Secondary Diagnosis:	Scalp laceration, initial encounter  Secondary Diagnosis:	Encephalopathy

## 2017-11-10 NOTE — ED ADULT TRIAGE NOTE - CHIEF COMPLAINT QUOTE
unwitnessed fall outdoors, unknown LOC, +laceration to L posterior head witnessed fall outside bathroom, (son present), no LOC, +on asa, +ambulatory afterwards, +laceration to L posterior head, +confused after fall

## 2017-11-10 NOTE — ED PROVIDER NOTE - PROGRESS NOTE DETAILS
Attending MD Quinonez: laceration repaired primarily as per procedure note. Chart review reveals patient had tetanus administered in 2016, UTD Attending MD Quinonez: Dr. Tafoya (PMD) called at , no answer, voicemail left to discuss need for admission Attending MD Quinonez: page sent out to Dr. Tafoya, awaiting callback.

## 2017-11-10 NOTE — ED PROVIDER NOTE - EXTREMITY EXAM
no peripheral edema, extremities nontender, full active painless ROM of all joints in upper and lower extremities

## 2017-11-11 DIAGNOSIS — S01.01XA LACERATION WITHOUT FOREIGN BODY OF SCALP, INITIAL ENCOUNTER: ICD-10-CM

## 2017-11-11 DIAGNOSIS — I95.89 OTHER HYPOTENSION: ICD-10-CM

## 2017-11-11 DIAGNOSIS — R00.1 BRADYCARDIA, UNSPECIFIED: ICD-10-CM

## 2017-11-11 DIAGNOSIS — G30.8 OTHER ALZHEIMER'S DISEASE: ICD-10-CM
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
## 2017-11-11 LAB
ALBUMIN SERPL ELPH-MCNC: 4.3 G/DL — SIGNIFICANT CHANGE UP (ref 3.3–5)
ALP SERPL-CCNC: 86 U/L — SIGNIFICANT CHANGE UP (ref 40–120)
ALT FLD-CCNC: 35 U/L RC — SIGNIFICANT CHANGE UP (ref 10–45)
ANION GAP SERPL CALC-SCNC: 11 MMOL/L — SIGNIFICANT CHANGE UP (ref 5–17)
AST SERPL-CCNC: 26 U/L — SIGNIFICANT CHANGE UP (ref 10–40)
BASOPHILS # BLD AUTO: 0.1 K/UL — SIGNIFICANT CHANGE UP (ref 0–0.2)
BASOPHILS NFR BLD AUTO: 0.7 % — SIGNIFICANT CHANGE UP (ref 0–2)
BILIRUB SERPL-MCNC: 0.6 MG/DL — SIGNIFICANT CHANGE UP (ref 0.2–1.2)
BLD GP AB SCN SERPL QL: NEGATIVE — SIGNIFICANT CHANGE UP
BUN SERPL-MCNC: 12 MG/DL — SIGNIFICANT CHANGE UP (ref 7–23)
CALCIUM SERPL-MCNC: 9.5 MG/DL — SIGNIFICANT CHANGE UP (ref 8.4–10.5)
CHLORIDE SERPL-SCNC: 104 MMOL/L — SIGNIFICANT CHANGE UP (ref 96–108)
CHOLEST SERPL-MCNC: 126 MG/DL — SIGNIFICANT CHANGE UP (ref 10–199)
CK MB BLD-MCNC: 6.3 % — HIGH (ref 0–3.5)
CK MB CFR SERPL CALC: 5.6 NG/ML — SIGNIFICANT CHANGE UP (ref 0–6.7)
CK SERPL-CCNC: 89 U/L — SIGNIFICANT CHANGE UP (ref 30–200)
CO2 SERPL-SCNC: 28 MMOL/L — SIGNIFICANT CHANGE UP (ref 22–31)
CREAT SERPL-MCNC: 1.09 MG/DL — SIGNIFICANT CHANGE UP (ref 0.5–1.3)
EOSINOPHIL # BLD AUTO: 0.1 K/UL — SIGNIFICANT CHANGE UP (ref 0–0.5)
EOSINOPHIL NFR BLD AUTO: 0.7 % — SIGNIFICANT CHANGE UP (ref 0–6)
GLUCOSE SERPL-MCNC: 92 MG/DL — SIGNIFICANT CHANGE UP (ref 70–99)
HBA1C BLD-MCNC: 5.7 % — HIGH (ref 4–5.6)
HCT VFR BLD CALC: 43.8 % — SIGNIFICANT CHANGE UP (ref 39–50)
HDLC SERPL-MCNC: 48 MG/DL — SIGNIFICANT CHANGE UP (ref 40–125)
HGB BLD-MCNC: 14.2 G/DL — SIGNIFICANT CHANGE UP (ref 13–17)
LIPID PNL WITH DIRECT LDL SERPL: 57 MG/DL — SIGNIFICANT CHANGE UP
LYMPHOCYTES # BLD AUTO: 2.3 K/UL — SIGNIFICANT CHANGE UP (ref 1–3.3)
LYMPHOCYTES # BLD AUTO: 27.1 % — SIGNIFICANT CHANGE UP (ref 13–44)
MAGNESIUM SERPL-MCNC: 2 MG/DL — SIGNIFICANT CHANGE UP (ref 1.6–2.6)
MCHC RBC-ENTMCNC: 32.5 GM/DL — SIGNIFICANT CHANGE UP (ref 32–36)
MCHC RBC-ENTMCNC: 33.8 PG — SIGNIFICANT CHANGE UP (ref 27–34)
MCV RBC AUTO: 104 FL — HIGH (ref 80–100)
MONOCYTES # BLD AUTO: 0.8 K/UL — SIGNIFICANT CHANGE UP (ref 0–0.9)
MONOCYTES NFR BLD AUTO: 9.2 % — SIGNIFICANT CHANGE UP (ref 2–14)
NEUTROPHILS # BLD AUTO: 5.3 K/UL — SIGNIFICANT CHANGE UP (ref 1.8–7.4)
NEUTROPHILS NFR BLD AUTO: 62.3 % — SIGNIFICANT CHANGE UP (ref 43–77)
NT-PROBNP SERPL-SCNC: 112 PG/ML — SIGNIFICANT CHANGE UP (ref 0–300)
PHOSPHATE SERPL-MCNC: 2.9 MG/DL — SIGNIFICANT CHANGE UP (ref 2.5–4.5)
PLATELET # BLD AUTO: 186 K/UL — SIGNIFICANT CHANGE UP (ref 150–400)
POTASSIUM SERPL-MCNC: 4 MMOL/L — SIGNIFICANT CHANGE UP (ref 3.5–5.3)
POTASSIUM SERPL-SCNC: 4 MMOL/L — SIGNIFICANT CHANGE UP (ref 3.5–5.3)
PROT SERPL-MCNC: 7.1 G/DL — SIGNIFICANT CHANGE UP (ref 6–8.3)
RBC # BLD: 4.21 M/UL — SIGNIFICANT CHANGE UP (ref 4.2–5.8)
RBC # FLD: 11.8 % — SIGNIFICANT CHANGE UP (ref 10.3–14.5)
RH IG SCN BLD-IMP: POSITIVE — SIGNIFICANT CHANGE UP
SODIUM SERPL-SCNC: 143 MMOL/L — SIGNIFICANT CHANGE UP (ref 135–145)
TOTAL CHOLESTEROL/HDL RATIO MEASUREMENT: 2.6 RATIO — LOW (ref 3.4–9.6)
TRIGL SERPL-MCNC: 105 MG/DL — SIGNIFICANT CHANGE UP (ref 10–149)
TROPONIN T SERPL-MCNC: <0.01 NG/ML — SIGNIFICANT CHANGE UP (ref 0–0.06)
TSH SERPL-MCNC: 1.43 UIU/ML — SIGNIFICANT CHANGE UP (ref 0.27–4.2)
VIT B12 SERPL-MCNC: 742 PG/ML — SIGNIFICANT CHANGE UP (ref 243–894)
WBC # BLD: 8.6 K/UL — SIGNIFICANT CHANGE UP (ref 3.8–10.5)
WBC # FLD AUTO: 8.6 K/UL — SIGNIFICANT CHANGE UP (ref 3.8–10.5)

## 2017-11-11 RX ORDER — HALOPERIDOL DECANOATE 100 MG/ML
1 INJECTION INTRAMUSCULAR EVERY 6 HOURS
Qty: 0 | Refills: 0 | Status: DISCONTINUED | OUTPATIENT
Start: 2017-11-11 | End: 2017-11-15

## 2017-11-11 RX ORDER — MEMANTINE HYDROCHLORIDE 10 MG/1
10 TABLET ORAL AT BEDTIME
Qty: 0 | Refills: 0 | Status: DISCONTINUED | OUTPATIENT
Start: 2017-11-11 | End: 2017-11-21

## 2017-11-11 RX ORDER — SODIUM CHLORIDE 9 MG/ML
1000 INJECTION INTRAMUSCULAR; INTRAVENOUS; SUBCUTANEOUS
Qty: 0 | Refills: 0 | Status: DISCONTINUED | OUTPATIENT
Start: 2017-11-11 | End: 2017-11-21

## 2017-11-11 RX ORDER — FAMOTIDINE 10 MG/ML
20 INJECTION INTRAVENOUS
Qty: 0 | Refills: 0 | Status: DISCONTINUED | OUTPATIENT
Start: 2017-11-11 | End: 2017-11-21

## 2017-11-11 RX ORDER — SENNA PLUS 8.6 MG/1
2 TABLET ORAL AT BEDTIME
Qty: 0 | Refills: 0 | Status: DISCONTINUED | OUTPATIENT
Start: 2017-11-11 | End: 2017-11-21

## 2017-11-11 RX ORDER — DOCUSATE SODIUM 100 MG
100 CAPSULE ORAL THREE TIMES A DAY
Qty: 0 | Refills: 0 | Status: DISCONTINUED | OUTPATIENT
Start: 2017-11-11 | End: 2017-11-21

## 2017-11-11 RX ORDER — MIDODRINE HYDROCHLORIDE 2.5 MG/1
5 TABLET ORAL
Qty: 0 | Refills: 0 | Status: DISCONTINUED | OUTPATIENT
Start: 2017-11-11 | End: 2017-11-21

## 2017-11-11 RX ORDER — ASPIRIN/CALCIUM CARB/MAGNESIUM 324 MG
81 TABLET ORAL DAILY
Qty: 0 | Refills: 0 | Status: DISCONTINUED | OUTPATIENT
Start: 2017-11-11 | End: 2017-11-21

## 2017-11-11 RX ORDER — HEPARIN SODIUM 5000 [USP'U]/ML
5000 INJECTION INTRAVENOUS; SUBCUTANEOUS EVERY 12 HOURS
Qty: 0 | Refills: 0 | Status: DISCONTINUED | OUTPATIENT
Start: 2017-11-11 | End: 2017-11-21

## 2017-11-11 RX ADMIN — Medication 81 MILLIGRAM(S): at 13:24

## 2017-11-11 RX ADMIN — HEPARIN SODIUM 5000 UNIT(S): 5000 INJECTION INTRAVENOUS; SUBCUTANEOUS at 17:03

## 2017-11-11 RX ADMIN — Medication 100 MILLIGRAM(S): at 13:24

## 2017-11-11 RX ADMIN — Medication 100 MILLIGRAM(S): at 05:17

## 2017-11-11 RX ADMIN — MIDODRINE HYDROCHLORIDE 5 MILLIGRAM(S): 2.5 TABLET ORAL at 17:04

## 2017-11-11 RX ADMIN — HEPARIN SODIUM 5000 UNIT(S): 5000 INJECTION INTRAVENOUS; SUBCUTANEOUS at 05:17

## 2017-11-11 RX ADMIN — SODIUM CHLORIDE 100 MILLILITER(S): 9 INJECTION INTRAMUSCULAR; INTRAVENOUS; SUBCUTANEOUS at 03:00

## 2017-11-11 RX ADMIN — FAMOTIDINE 20 MILLIGRAM(S): 10 INJECTION INTRAVENOUS at 05:17

## 2017-11-11 RX ADMIN — FAMOTIDINE 20 MILLIGRAM(S): 10 INJECTION INTRAVENOUS at 17:04

## 2017-11-11 RX ADMIN — Medication 100 MILLIGRAM(S): at 21:47

## 2017-11-11 RX ADMIN — MEMANTINE HYDROCHLORIDE 10 MILLIGRAM(S): 10 TABLET ORAL at 21:47

## 2017-11-11 RX ADMIN — MIDODRINE HYDROCHLORIDE 5 MILLIGRAM(S): 2.5 TABLET ORAL at 05:17

## 2017-11-11 NOTE — H&P ADULT - PROBLEM SELECTOR PLAN 3
- unclear etiology, pt is on midodrine at home  - cont home midodrine  - /hr for now  - r/o infection => CXR clear, u/a bland  - grossly normal LV/RV function on last TTE - unclear etiology, pt is on midodrine at home  - orthostats negative in ED  - cont home midodrine  - /hr for now  - r/o infection => CXR clear, u/a bland  - grossly normal LV/RV function on last TTE

## 2017-11-11 NOTE — PATIENT PROFILE ADULT. - FUNCTIONAL SCREEN CURRENT LEVEL: EATING, MLM
GYNECOLOGIC ONCOLOGY PROGRESS NOTE      PROBLEMS:    Urinary tract infection without hematuria, site unspecified  Clostridium difficile colitis  UTI (urinary tract infection)  Urinary retention  Pulmonary embolism, other  Uterine sarcoma      Pt seen and examined at bedside. Patient is without complaints.  Reports no diarrhea since yesterday  WBC trending  down  Denies Nausea, Vomiting.  Denies shortness of breath, chest pain or dyspnea on exertion.  Tolerating diet.    OBJECTIVE:     VITALS:  T(F): 97.5, Max: 97.5 (12-19 @ 22:30)  HR: 65 (65 - 65)  BP: 150/71 (150/71 - 150/71)  RR: --  SpO2: --  Wt(kg): --    I&O's Summary      MEDICATIONS  (STANDING):  vancomycin    Solution 250milliGRAM(s) Oral every 6 hours  dronabinol 2.5milliGRAM(s) Oral two times a day  insulin lispro (HumaLOG) corrective regimen sliding scale  SubCutaneous three times a day before meals  dextrose 5%. 1000milliLiter(s) IV Continuous <Continuous>  dextrose 50% Injectable 12.5Gram(s) IV Push once  dextrose 50% Injectable 25Gram(s) IV Push once  dextrose 50% Injectable 25Gram(s) IV Push once  metoprolol succinate ER 50milliGRAM(s) Oral daily  dextrose 5%. 1000milliLiter(s) IV Continuous <Continuous>  tamsulosin 0.4milliGRAM(s) Oral at bedtime  enoxaparin Injectable 70milliGRAM(s) SubCutaneous every 12 hours  sodium chloride 0.45% 1000milliLiter(s) IV Continuous <Continuous>  potassium chloride  10 mEq/100 mL IVPB 10milliEquivalent(s) IV Intermittent once  potassium chloride  10 mEq/100 mL IVPB 10milliEquivalent(s) IV Intermittent once    MEDICATIONS  (PRN):  ondansetron Injectable 4milliGRAM(s) IV Push every 6 hours PRN Nausea and/or Vomiting  dextrose Gel 1Dose(s) Oral once PRN Blood Glucose LESS THAN 70 milliGRAM(s)/deciLiter  glucagon  Injectable 1milliGRAM(s) IntraMuscular once PRN Glucose <70 milliGRAM(s)/deciLiter  acetaminophen   Tablet. 650milliGRAM(s) Oral every 6 hours PRN headache/pain      Physical Exam:  Constitutional: NAD  Pulmonary: clear to auscultation bilaterally   Cardiovascular: Regular rate and rhythm   Abdomen: soft, non-tender, non-distended, normal bowel signs  Extremities: no lower extremity edema or calve tenderness, Samir's sign negative.        LABS:                        9.4    21.80 )-----------( 289      ( 20 Dec 2016 06:33 )             26.8     20 Dec 2016 06:33    143    |  111    |  11.0   ----------------------------<  120    2.9     |  22.0   |  0.43     Ca    7.3        20 Dec 2016 06:33  Mg     1.5       20 Dec 2016 06:33            RADIOLOGY & ADDITIONAL TESTS: (0) independent

## 2017-11-11 NOTE — H&P ADULT - NSHPREVIEWOFSYSTEMS_GEN_ALL_CORE
REVIEW OF SYSTEMS:  CONSTITUTIONAL: No weakness. No fevers. No chills. No rigors.  EYES: No visual changes. No eye pain.  ENT: No hearing difficulty. No vertigo. No dysphagia. No Sinusitis/rhinorrhea.  NECK: No pain. No stiffness/rigidity.  CARDIAC: No chest pain. No palpitations.  RESPIRATORY: No cough. No SOB. No hemoptysis.  GASTROINTESTINAL: No abdominal pain. No nausea. No vomiting. No hematemesis.   GENITOURINARY: No dysuria. No frequency.   NEUROLOGICAL: No numbness/tingling. No focal weakness. No incontinence. No headache.  BACK: No back pain.  EXTREMITIES: No lower extremity edema. Full ROM.  SKIN: No rashes. No itching. No other lesions.    Unless indicated above, unable to assess ROS 2/2 dementia

## 2017-11-11 NOTE — H&P ADULT - ASSESSMENT
83M c hx alzheimer's dementia, BPH, renal stone, HLD, GIB, sinus bradycardia s/p loop recorder, multiple hospitalization for falls in past, pw fall c head trauma in setting of hypotension and sinus bradycardia

## 2017-11-11 NOTE — H&P ADULT - ATTENDING COMMENTS
Pt seen prior to midnight. Pt signed out to NP service. Time spent 70 minutes on total encounter. Dr. Tafoya to assume care in AM.

## 2017-11-11 NOTE — H&P ADULT - NSHPPHYSICALEXAM_GEN_ALL_CORE
PHYSICAL EXAM:   GENERAL: Alert. Mildly confused. No acute distress.  HEAD:  Posterior scalp lac. Normocephalic.  EYES: EOMI. PERRLA. Normal conjunctiva/sclera.  ENT: Neck supple. No JVD. Moist oral mucosa. Not edentulous. No thrush.  LYMPH: Normal supraclavicular/cervical lymph nodes.   CARDIAC: +distant heart sounds.  LUNG/CHEST: CTAB. BS equal bilaterally. No wheezes. No rales. No rhonchi.  ABDOMEN: Soft. No tenderness. No distension. No fluid wave. Normal bowel sounds.  BACK: No midline/vertebral tenderness. No flank tenderness.  VASCULAR: +2 b/l radial or ulnar pulses. Palpable DP pulses.  EXTREMITIES:  No clubbing. No cyanosis. No edema. Moving all 4.  NEUROLOGY: A&Ox2. Non-focal exam. Cranial nerves intact. Normal speech.  PSYCH: Normal behavior. Flat affect.  SKIN: No jaundice. No erythema. No rash/lesion.  Vascular Access:       ICU Vital Signs Last 24 Hrs  T(C): 36.3 (11 Nov 2017 00:22), Max: 37 (10 Nov 2017 20:30)  T(F): 97.4 (11 Nov 2017 00:22), Max: 98.6 (10 Nov 2017 20:30)  HR: 68 (11 Nov 2017 00:22) (49 - 68)  BP: 130/89 (11 Nov 2017 00:22) (101/59 - 141/55)  BP(mean): --  ABP: --  ABP(mean): --  RR: 18 (11 Nov 2017 00:22) (18 - 20)  SpO2: 99% (11 Nov 2017 00:22) (92% - 99%)      I&O's Summary

## 2017-11-11 NOTE — H&P ADULT - PROBLEM SELECTOR PLAN 5
- A&Ox2 currently, presumably at baseline  - cont to monitor  - need to clarify home meds with family

## 2017-11-11 NOTE — H&P ADULT - NSHPSOCIALHISTORY_GEN_ALL_CORE
The patient's left shoulder was prepped with betadine solution after verification of allergies. Area approximately 10 cm x 10 cm prepped in a sterile fashion. After injection, betadine removed with soap and water and band-aids applied.    4cc Lidocaine 1%  NDC 17138-404-18, LOT [id number],    3cc Bupivacaine 0.25% NDC 55150-167-10, LOT [id number],  2018  Kenolog 40 mg 2 cc NDC 6040-8826-15, LOT [id number], EXP 2018 injected into patient's left shoulder by Dr.Troy Monroe        Social History:    Marital Status: ( x ) , (  ) Single, (  ) , (  ) , (  )   # of Children: 4 sons  Lives with: (  ) alone, ( x ) children, ( x ) spouse, (  ) parents, (  ) siblings, (  ) friends, (  ) other:   Occupation: retired     Substance Use/Illicit Drugs: (  ) never used, (  ) other:   Tobacco Usage: (  ) never smoked, (  ) former smoker, (  ) current smoker, and Total Pack-Years:   Last Alcohol Usage/Frequency/Amount:

## 2017-11-11 NOTE — H&P ADULT - PROBLEM SELECTOR PLAN 1
- PT eval  - check b12, a1c, vit D  - will need Loop recorder interrogation in AM  - pt is at high risk for recurrent futue falls - PT eval  - check b12, a1c, vit D  - called house cards for Loop recorder interrogation only  - pt is at high risk for recurrent futue falls

## 2017-11-11 NOTE — H&P ADULT - PROBLEM SELECTOR PLAN 4
- can be complication of midodrine, ranitidine  - will call for Loop recorder interrogation in AM  - hx of sinus pauses  - consider PPM placement evaluation - can be complication of midodrine, ranitidine  - will call for Loop recorder interrogation in AM  - hx of sinus pauses  - consider PPM placement evaluation  - given known hx of bradycardia and unlikely syncope as per history, will hold off tele for now until recorded interrogated.

## 2017-11-11 NOTE — CHART NOTE - NSCHARTNOTEFT_GEN_A_CORE
MEdQuantiaMD loop recorder   Card fellow on call called for interrogation  implated 6/2017 by Dr Booker Parkinson    Device reported a sinus pause 11/10 at 3:30pm but on review of ecg strip at that time, sinus with QRS marching through, which was not sensed by the loop recorder. Thus, not a sinus pause. Furthermore event did not correlate to fall event (occurred at around 7pm) as discussed w/ patients wife.    please contact private ep Dr Parkinson for further eval/recs

## 2017-11-11 NOTE — H&P ADULT - NSHPLABSRESULTS_GEN_ALL_CORE
Personally reviewed labs.   Personally reviewed imaging.   Personally reviewed EKG. Sinus dnaiel rate 59. . No ST or TW change. No Q waves.                          14.2   9.4   )-----------( 144      ( 10 Nov 2017 20:56 )             43.6       11-10    144  |  105  |  14  ----------------------------<  102<H>  4.4   |  25  |  1.04    Ca    9.5      10 Nov 2017 20:56    TPro  7.0  /  Alb  4.3  /  TBili  0.5  /  DBili  x   /  AST  25  /  ALT  36  /  AlkPhos  84  11-10      CARDIAC MARKERS ( 10 Nov 2017 20:56 )  x     / <0.01 ng/mL / x     / x     / 4.6 ng/mL        LIVER FUNCTIONS - ( 10 Nov 2017 20:56 )  Alb: 4.3 g/dL / Pro: 7.0 g/dL / ALK PHOS: 84 U/L / ALT: 36 U/L RC / AST: 25 U/L / GGT: x             PT/INR - ( 10 Nov 2017 20:56 )   PT: 12.3 sec;   INR: 1.13 ratio         PTT - ( 10 Nov 2017 20:56 )  PTT:25.3 sec    Urinalysis Basic - ( 10 Nov 2017 22:13 )    Color: Yellow / Appearance: Clear / S.020 / pH: x  Gluc: x / Ketone: Negative  / Bili: Negative / Urobili: Negative   Blood: x / Protein: Trace / Nitrite: Negative   Leuk Esterase: Negative / RBC: x / WBC x   Sq Epi: x / Non Sq Epi: x / Bacteria: x

## 2017-11-11 NOTE — H&P ADULT - HISTORY OF PRESENT ILLNESS
83M c hx alzheimer's dementia, BPH, renal stone, HLD, GIB, sinus bradycardia s/p loop recorder, multiple hospitalization for falls in past, pw fall.    History obtained per chart. Called and left voicemail on pt family's phone. Pt was in bathroom and walking out when he had mechanical fall, witnessed by pt's son. Pt also had head trauma, requiring lac repair in the ED. Pt currently denies any fever, chills, N/V, URI symptoms, CP, SOB, abd pain. Pt is currently A&Ox2. Pt states he is here for a fall, but does not recall the fall. Unclear if he had LOC during or after the fall. At baseline, pt states he is able to ambulate to the bathroom with a walker. Wife stated to ED that pt's blood pressure has been "low" at home.    VS: Tm 98.6, P 49, -141/59, R 20, 92% RA

## 2017-11-12 LAB
24R-OH-CALCIDIOL SERPL-MCNC: 43.4 NG/ML — SIGNIFICANT CHANGE UP (ref 30–80)
ANION GAP SERPL CALC-SCNC: 12 MMOL/L — SIGNIFICANT CHANGE UP (ref 5–17)
BUN SERPL-MCNC: 13 MG/DL — SIGNIFICANT CHANGE UP (ref 7–23)
CALCIUM SERPL-MCNC: 9.9 MG/DL — SIGNIFICANT CHANGE UP (ref 8.4–10.5)
CHLORIDE SERPL-SCNC: 103 MMOL/L — SIGNIFICANT CHANGE UP (ref 96–108)
CO2 SERPL-SCNC: 27 MMOL/L — SIGNIFICANT CHANGE UP (ref 22–31)
CREAT SERPL-MCNC: 1.14 MG/DL — SIGNIFICANT CHANGE UP (ref 0.5–1.3)
GLUCOSE SERPL-MCNC: 91 MG/DL — SIGNIFICANT CHANGE UP (ref 70–99)
HCT VFR BLD CALC: 39.8 % — SIGNIFICANT CHANGE UP (ref 39–50)
HGB BLD-MCNC: 13.5 G/DL — SIGNIFICANT CHANGE UP (ref 13–17)
MCHC RBC-ENTMCNC: 33.5 PG — SIGNIFICANT CHANGE UP (ref 27–34)
MCHC RBC-ENTMCNC: 33.9 GM/DL — SIGNIFICANT CHANGE UP (ref 32–36)
MCV RBC AUTO: 98.8 FL — SIGNIFICANT CHANGE UP (ref 80–100)
PLATELET # BLD AUTO: 176 K/UL — SIGNIFICANT CHANGE UP (ref 150–400)
POTASSIUM SERPL-MCNC: 3.7 MMOL/L — SIGNIFICANT CHANGE UP (ref 3.5–5.3)
POTASSIUM SERPL-SCNC: 3.7 MMOL/L — SIGNIFICANT CHANGE UP (ref 3.5–5.3)
RBC # BLD: 4.03 M/UL — LOW (ref 4.2–5.8)
RBC # FLD: 13.4 % — SIGNIFICANT CHANGE UP (ref 10.3–14.5)
SODIUM SERPL-SCNC: 142 MMOL/L — SIGNIFICANT CHANGE UP (ref 135–145)
WBC # BLD: 7.43 K/UL — SIGNIFICANT CHANGE UP (ref 3.8–10.5)
WBC # FLD AUTO: 7.43 K/UL — SIGNIFICANT CHANGE UP (ref 3.8–10.5)

## 2017-11-12 PROCEDURE — 93010 ELECTROCARDIOGRAM REPORT: CPT

## 2017-11-12 RX ADMIN — MIDODRINE HYDROCHLORIDE 5 MILLIGRAM(S): 2.5 TABLET ORAL at 18:13

## 2017-11-12 RX ADMIN — HEPARIN SODIUM 5000 UNIT(S): 5000 INJECTION INTRAVENOUS; SUBCUTANEOUS at 18:13

## 2017-11-12 RX ADMIN — Medication 100 MILLIGRAM(S): at 21:46

## 2017-11-12 RX ADMIN — FAMOTIDINE 20 MILLIGRAM(S): 10 INJECTION INTRAVENOUS at 06:06

## 2017-11-12 RX ADMIN — HALOPERIDOL DECANOATE 1 MILLIGRAM(S): 100 INJECTION INTRAMUSCULAR at 06:13

## 2017-11-12 RX ADMIN — MEMANTINE HYDROCHLORIDE 10 MILLIGRAM(S): 10 TABLET ORAL at 22:48

## 2017-11-12 RX ADMIN — Medication 100 MILLIGRAM(S): at 11:10

## 2017-11-12 RX ADMIN — Medication 100 MILLIGRAM(S): at 06:05

## 2017-11-12 RX ADMIN — MIDODRINE HYDROCHLORIDE 5 MILLIGRAM(S): 2.5 TABLET ORAL at 06:06

## 2017-11-12 RX ADMIN — FAMOTIDINE 20 MILLIGRAM(S): 10 INJECTION INTRAVENOUS at 18:13

## 2017-11-12 RX ADMIN — HEPARIN SODIUM 5000 UNIT(S): 5000 INJECTION INTRAVENOUS; SUBCUTANEOUS at 06:05

## 2017-11-12 RX ADMIN — Medication 81 MILLIGRAM(S): at 11:10

## 2017-11-12 NOTE — PROVIDER CONTACT NOTE (OTHER) - ASSESSMENT
Patient alert and oriented x 3 with confusion at times.  Denies any c/o light headedness or dizziness.  Resting in bed in comfort.  VSS except bradycardic with HR in 40's. Patient asleep during this time.

## 2017-11-12 NOTE — PHYSICAL THERAPY INITIAL EVALUATION ADULT - PERTINENT HX OF CURRENT PROBLEM, REHAB EVAL
83M c hx alzheimer's dementia, BPH, renal stone, HLD, GIB, sinus bradycardia s/p loop recorder, multiple hospitalization for falls in past, pw fall c head trauma in setting of hypotension and sinus bradycardia 83M c hx alzheimer's dementia, BPH, renal stone, HLD, GIB, sinus bradycardia s/p loop recorder, multiple hospitalization for falls in past, pw fall c head trauma in setting of hypotension and sinus bradycardia. EKG: sinus bradycardia

## 2017-11-12 NOTE — PHYSICAL THERAPY INITIAL EVALUATION ADULT - DISCHARGE DISPOSITION, PT EVAL
home w/ home PT/Home with home PT for gait, balance, & strength training and to return pt to baseline functional mobility status. Rolling walker with ambulation (pt own). Supervision/assist with mobility skills, as prior. **Pt cleared for d/c home from PT perspective at this time.

## 2017-11-12 NOTE — PHYSICAL THERAPY INITIAL EVALUATION ADULT - ADDITIONAL COMMENTS
Pt is a poor historian due to h/o Alzheimer's dementia, therefore social hx obtained from CM documentation: pt lives with spouse and son in home with 1 step to enter. Pt previously requiring assist with ADLs and ambulation with walker.

## 2017-11-12 NOTE — CHART NOTE - NSCHARTNOTEFT_GEN_A_CORE
Patient evaluated at bedside for confusion/agitation  Patient with hx of Alzhimer's, on haldol now for agitation  Patient admitted s/p fall in setting of hypotension and bradycardia    < from: CT Head No Cont (11.10.17 @ 21:38) >      Head CT: No obvious acute intracranial hemorrhage or displaced skull   fracture, given the extent of artifact. If clinically indicated, a   short-term follow-up or an MRI may be obtained for further evaluation.      Cervical spine CT: No fracture or traumatic malalignment in the cervical   spine.  If clinically indicated, an MRI may be obtained for further   evaluation.     Multilevel degenerative changes of the cervical spine.       Constant observation ordered  fall precautions  Continue tele monitoring  R2 pads to apply  cardiology consult for bradycardia   Hold Midodrine if patient bradycardiac  patient seen by cardiac fellow overnight for loop recorder interrogation  Continue haldol (QTC < 500)  Will continue to monitor  Will update with primary team    Carolyn STEPHENS BC  Spectra# [phone]

## 2017-11-12 NOTE — PHYSICAL THERAPY INITIAL EVALUATION ADULT - PLANNED THERAPY INTERVENTIONS, PT EVAL
balance training/gait training/postural re-education/bed mobility training/transfer training/strengthening

## 2017-11-13 ENCOUNTER — TRANSCRIPTION ENCOUNTER (OUTPATIENT)
Age: 82
End: 2017-11-13

## 2017-11-13 LAB
ANION GAP SERPL CALC-SCNC: 14 MMOL/L — SIGNIFICANT CHANGE UP (ref 5–17)
BUN SERPL-MCNC: 17 MG/DL — SIGNIFICANT CHANGE UP (ref 7–23)
CALCIUM SERPL-MCNC: 9.6 MG/DL — SIGNIFICANT CHANGE UP (ref 8.4–10.5)
CHLORIDE SERPL-SCNC: 102 MMOL/L — SIGNIFICANT CHANGE UP (ref 96–108)
CO2 SERPL-SCNC: 23 MMOL/L — SIGNIFICANT CHANGE UP (ref 22–31)
CREAT SERPL-MCNC: 1.01 MG/DL — SIGNIFICANT CHANGE UP (ref 0.5–1.3)
GLUCOSE SERPL-MCNC: 90 MG/DL — SIGNIFICANT CHANGE UP (ref 70–99)
HCT VFR BLD CALC: 39 % — SIGNIFICANT CHANGE UP (ref 39–50)
HGB BLD-MCNC: 13.3 G/DL — SIGNIFICANT CHANGE UP (ref 13–17)
MCHC RBC-ENTMCNC: 33.4 PG — SIGNIFICANT CHANGE UP (ref 27–34)
MCHC RBC-ENTMCNC: 34.1 GM/DL — SIGNIFICANT CHANGE UP (ref 32–36)
MCV RBC AUTO: 98 FL — SIGNIFICANT CHANGE UP (ref 80–100)
PLATELET # BLD AUTO: 180 K/UL — SIGNIFICANT CHANGE UP (ref 150–400)
POTASSIUM SERPL-MCNC: 3.9 MMOL/L — SIGNIFICANT CHANGE UP (ref 3.5–5.3)
POTASSIUM SERPL-SCNC: 3.9 MMOL/L — SIGNIFICANT CHANGE UP (ref 3.5–5.3)
RBC # BLD: 3.98 M/UL — LOW (ref 4.2–5.8)
RBC # FLD: 13.7 % — SIGNIFICANT CHANGE UP (ref 10.3–14.5)
SODIUM SERPL-SCNC: 139 MMOL/L — SIGNIFICANT CHANGE UP (ref 135–145)
WBC # BLD: 5.97 K/UL — SIGNIFICANT CHANGE UP (ref 3.8–10.5)
WBC # FLD AUTO: 5.97 K/UL — SIGNIFICANT CHANGE UP (ref 3.8–10.5)

## 2017-11-13 RX ADMIN — MIDODRINE HYDROCHLORIDE 5 MILLIGRAM(S): 2.5 TABLET ORAL at 16:55

## 2017-11-13 RX ADMIN — FAMOTIDINE 20 MILLIGRAM(S): 10 INJECTION INTRAVENOUS at 05:23

## 2017-11-13 RX ADMIN — Medication 100 MILLIGRAM(S): at 21:55

## 2017-11-13 RX ADMIN — HEPARIN SODIUM 5000 UNIT(S): 5000 INJECTION INTRAVENOUS; SUBCUTANEOUS at 16:56

## 2017-11-13 RX ADMIN — MEMANTINE HYDROCHLORIDE 10 MILLIGRAM(S): 10 TABLET ORAL at 21:55

## 2017-11-13 RX ADMIN — Medication 100 MILLIGRAM(S): at 11:48

## 2017-11-13 RX ADMIN — HEPARIN SODIUM 5000 UNIT(S): 5000 INJECTION INTRAVENOUS; SUBCUTANEOUS at 05:23

## 2017-11-13 RX ADMIN — Medication 81 MILLIGRAM(S): at 11:48

## 2017-11-13 RX ADMIN — FAMOTIDINE 20 MILLIGRAM(S): 10 INJECTION INTRAVENOUS at 16:56

## 2017-11-13 RX ADMIN — HALOPERIDOL DECANOATE 1 MILLIGRAM(S): 100 INJECTION INTRAMUSCULAR at 22:36

## 2017-11-13 RX ADMIN — MIDODRINE HYDROCHLORIDE 5 MILLIGRAM(S): 2.5 TABLET ORAL at 05:23

## 2017-11-13 RX ADMIN — HALOPERIDOL DECANOATE 1 MILLIGRAM(S): 100 INJECTION INTRAMUSCULAR at 09:20

## 2017-11-13 RX ADMIN — Medication 100 MILLIGRAM(S): at 05:23

## 2017-11-13 NOTE — DISCHARGE NOTE ADULT - HOSPITAL COURSE
83M c hx Alzheimer’s dementia, BPH, renal stone, HLD, GIB, sinus bradycardia s/p loop recorder, multiple hospitalization for falls in past, was admitted to the hospital secondary to  head trauma in setting of hypotension and sinus bradycardia.  Pt was evaluated by Cardiology.  Cardiac enzymes were negative x 2.  Chest x-ray was clear of infection.  Pt is medically stable for discharge to [facility]. 82 y/o male with PMH of Alzheimer’s dementia, BPH, renal stone, HLD, GIB, sinus bradycardia s/p loop recorder, multiple hospitalization for falls in past, was admitted to the hospital secondary to head trauma in setting of hypotension and sinus bradycardia.  Wife reported that patient was previously on Nudexta, but says it was stopped "recently" but cannot state the reason.  Cardiac enzymes were negative x 2.  EKGs with SR/SB 1st-degree AV block - HR 55 to 70's on tele.  Patient orthostatic negative and remained hemodynamically stable on home dose of midodrine.  Chest x-ray was clear of infection.  Patient was seen by the Psychiatrist for agitation - noted the possibility that Nudexta is contributing to dizziness and falls in addition to the hypotension.  Patient has been doing well of Nudexta; started on Namenda for underlying dementia.  A trial of Seroquel has been started with good effect - patient to get 4 more nights of Seroquel and Psychiatrist at rehab to re-evaluate whether medication it can be discontinued.  Pt is medically stable for discharge to [facility].  Patient to follow up with is Primary Care Doctor within 1 week of discharge from rehab, and with Geriatric Outpatient Psychiatry Department at [facility] as appropriate. 84 y/o male with PMH of Alzheimer’s dementia, BPH, renal stone, HLD, GIB, sinus bradycardia s/p loop recorder, multiple hospitalization for falls in past, was admitted to the hospital secondary to head trauma in setting of hypotension and sinus bradycardia.  Wife reported that patient was previously on Nudexta, but says it was stopped "recently" but cannot state the reason.  Cardiac enzymes were negative x 2.  EKGs with SR/SB 1st-degree AV block - HR 55 to 70's on tele.  Patient orthostatic negative and remained hemodynamically stable on home dose of midodrine.  Chest x-ray was clear of infection.  Patient was placed on enhanced supervision for agitation.  Patient was seen by the Psychiatrist who noted the possibility that Nudexta was contributing to dizziness and falls in addition to the hypotension.  Patient has been doing well of Nudexta; started on Namenda for underlying dementia.  A trial of Seroquel has been started with good effect - patient to get 4 more nights of Seroquel and Psychiatrist at rehab to re-evaluate whether medication it can be discontinued.  Patient is now calm and enhanced supervision has been discontinued.  Patient is medically stable for discharge to [facility].  Patient to follow up with is Primary Care Doctor within 1 week of discharge from rehab, and with Geriatric Outpatient Psychiatry Department at [facility] as appropriate.

## 2017-11-13 NOTE — DISCHARGE NOTE ADULT - CARE PROVIDER_API CALL
[place],   Phone: (   )    -  Fax: (   )    - Dalton Tafoya), Internal Medicine  [address] [place] [address]  Phone: [phone]  Fax: [phone]    Geriatric Outpatient Psychiatry Department, at [facility]  Phone: [phone]  Fax: (       -

## 2017-11-13 NOTE — DISCHARGE NOTE ADULT - PATIENT PORTAL LINK FT
“You can access the Follow[employer] Patient Portal, offered by [facility], by registering with the following website: [email or web address]”

## 2017-11-13 NOTE — DISCHARGE NOTE ADULT - PROVIDER TOKENS
FREE:[LAST:[PMD],PHONE:[(   )    -],FAX:[(   )    -]] TOKEN:'[id number]',FREE:[LAST:[Geriatric Outpatient Psychiatry Department],FIRST:[at [facility]],PHONE:[phone]],FAX:[(   )    -]]

## 2017-11-13 NOTE — DISCHARGE NOTE ADULT - SECONDARY DIAGNOSIS.
Alzheimer's dementia with behavioral disturbance, unspecified timing of dementia onset Hyperlipidemia GERD (gastroesophageal reflux disease) Sinus bradycardia

## 2017-11-13 NOTE — DISCHARGE NOTE ADULT - MEDICATION SUMMARY - MEDICATIONS TO STOP TAKING
I will STOP taking the medications listed below when I get home from the hospital:  None I will STOP taking the medications listed below when I get home from the hospital:    Nuedexta 20 mg-10 mg oral capsule  -- 1 cap(s) by mouth every 12 hours

## 2017-11-13 NOTE — DISCHARGE NOTE ADULT - ADDITIONAL INSTRUCTIONS
Follow up with your primary care doctors in 1 - 2 weeks Follow up with your primary care doctors in 1 - 2 weeks  Pt will need to follow up with Psych while at rehab. Follow up with your Primary Care Doctor within 1-2 weeks after discharge from rehab.  Patient will need Psychiatry follow-up rehab - Re-assess need for Seroquel on or before 11/25/2017.  Upon discharge from rehab, patient is to follow up with the Geriatric Outpatient Psychiatry Department at [facility] ([phone]).

## 2017-11-13 NOTE — DISCHARGE NOTE ADULT - CARE PLAN
Principal Discharge DX:	Fall, initial encounter  Goal:	stable  Instructions for follow-up, activity and diet:	no events in tele  Secondary Diagnosis:	Alzheimer's dementia with behavioral disturbance, unspecified timing of dementia onset  Goal:	stable  Secondary Diagnosis:	Hyperlipidemia  Goal:	stable  Secondary Diagnosis:	GERD (gastroesophageal reflux disease)  Instructions for follow-up, activity and diet:	HOME CARE INSTRUCTIONS  Change the factors that you can control. Ask your caregiver for guidance concerning weight loss, quitting smoking, and alcohol consumption.  Avoid foods and drinks that make your symptoms worse, such as:   Caffeine or alcoholic drinks.   Chocolate.   Peppermint or mint flavorings.  Garlic and onions.  Spicy foods.   Citrus fruits, such as oranges, guillermo, or limes.   Tomato-based foods such as sauce, chili, salsa, and pizza.  Fried and fatty foods.  Avoid lying down for the 3 hours prior to your bedtime or prior to taking a nap.  Eat small, frequent meals instead of large meals.   Wear loose-fitting clothing. Do not wear anything tight around your waist that causes pressure on your stomach.  Raise the head of your bed 6 to 8 inches with wood blocks to help you sleep. Extra pillows will not help.  Only take over-the-counter or prescription medicines for pain, discomfort, or fever as directed by your caregiver.   Do not take aspirin, ibuprofen, or other nonsteroidal anti-inflammatory drugs (NSAIDs).  Secondary Diagnosis:	Sinus bradycardia Principal Discharge DX:	Fall, initial encounter  Goal:	stable  Instructions for follow-up, activity and diet:	no events in tele  Secondary Diagnosis:	Alzheimer's dementia with behavioral disturbance, unspecified timing of dementia onset  Goal:	stable  Instructions for follow-up, activity and diet:	Continue Nuedexta and haldol  Secondary Diagnosis:	Hyperlipidemia  Goal:	stable  Instructions for follow-up, activity and diet:	Continue Zetia  Secondary Diagnosis:	GERD (gastroesophageal reflux disease)  Goal:	stable  Instructions for follow-up, activity and diet:	HOME CARE INSTRUCTIONS  Change the factors that you can control. Ask your caregiver for guidance concerning weight loss, quitting smoking, and alcohol consumption.  Avoid foods and drinks that make your symptoms worse, such as:   Caffeine or alcoholic drinks.   Chocolate.   Peppermint or mint flavorings.  Garlic and onions.  Spicy foods.   Citrus fruits, such as oranges, guillermo, or limes.   Tomato-based foods such as sauce, chili, salsa, and pizza.  Fried and fatty foods.  Avoid lying down for the 3 hours prior to your bedtime or prior to taking a nap.  Eat small, frequent meals instead of large meals.   Wear loose-fitting clothing. Do not wear anything tight around your waist that causes pressure on your stomach.  Raise the head of your bed 6 to 8 inches with wood blocks to help you sleep. Extra pillows will not help.  Only take over-the-counter or prescription medicines for pain, discomfort, or fever as directed by your caregiver.   Do not take aspirin, ibuprofen, or other nonsteroidal anti-inflammatory drugs (NSAIDs).  Secondary Diagnosis:	Sinus bradycardia  Goal:	resolved  Instructions for follow-up, activity and diet:	No events on tele Principal Discharge DX:	Fall, initial encounter  Goal:	stable  Instructions for follow-up, activity and diet:	Continue medications as tolerated.  Call for assistance before getting out of bed.  Participate in activities as tolerated.  Secondary Diagnosis:	Alzheimer's dementia with behavioral disturbance, unspecified timing of dementia onset  Goal:	stable  Instructions for follow-up, activity and diet:	Take all medications as prescribed.      Dementia with pseudobulbar palsy by history. No lability of affect off Nudexta (disconinued) - patient started on Namenda. Tolerating Seroquel with no EPS but occasional low BP.  Continue Seroquel for 4 more nights and have the psychiatrist at his rehab reassess need for continuation of Seroquel.  Secondary Diagnosis:	Hyperlipidemia  Goal:	stable  Instructions for follow-up, activity and diet:	You can help yourself with lifestyle changes (quitting smoking if you smoke), eat lots of fruits & vegetables & low fat dairy products, not a lot of meat & fatty foods, walk or some form of physical activity most days of the week, lose weight if you are overweight.  Take your cardiac medication as prescribed to lower cholesterol, to lower blood pressure, and control your blood sugar.  Secondary Diagnosis:	Sinus bradycardia  Goal:	Resolved  Instructions for follow-up, activity and diet:	Lay down with your feet up if you feel like you might faint; breath deeply until symptoms pass.  If you pass out, call 911 to be taken to the nearest emergency room.  Also call 911 to be taken to the nearest emergency room if you experience dizziness or lightheadedness, visual changes, speech changes, facial asymmetry, chest pain, abdominal pain, or back pain.  Secondary Diagnosis:	GERD (gastroesophageal reflux disease)  Goal:	resolved  Instructions for follow-up, activity and diet:	Change the factors that you can control. Ask your caregiver for guidance concerning weight loss, quitting smoking, and alcohol consumption.  Avoid foods and drinks that make your symptoms worse, such as:   Caffeine or alcoholic drinks. Chocolate.  Peppermint or mint flavorings.  Garlic and onions.  Spicy foods.   Citrus fruits, such as oranges, guillermo, or limes.  Tomato-based foods such as sauce, chili, salsa, and pizza.  Fried and fatty foods.  Avoid lying down for the 3 hours prior to your bedtime or prior to taking a nap.  Eat small, frequent meals instead of large meals.  Wear loose-fitting clothing. Do not wear anything tight around your waist that causes pressure on your stomach.  Raise the head of your bed 6 to 8 inches with wood blocks to help you sleep. Extra pillows will not help.  Only take over-the-counter or prescription medicines for pain, discomfort, or fever as directed by your caregiver.  Do not take aspirin, ibuprofen, or other nonsteroidal anti-inflammatory drugs (NSAIDs).

## 2017-11-13 NOTE — DISCHARGE NOTE ADULT - PLAN OF CARE
stable no events in tele HOME CARE INSTRUCTIONS  Change the factors that you can control. Ask your caregiver for guidance concerning weight loss, quitting smoking, and alcohol consumption.  Avoid foods and drinks that make your symptoms worse, such as:   Caffeine or alcoholic drinks.   Chocolate.   Peppermint or mint flavorings.  Garlic and onions.  Spicy foods.   Citrus fruits, such as oranges, guillermo, or limes.   Tomato-based foods such as sauce, chili, salsa, and pizza.  Fried and fatty foods.  Avoid lying down for the 3 hours prior to your bedtime or prior to taking a nap.  Eat small, frequent meals instead of large meals.   Wear loose-fitting clothing. Do not wear anything tight around your waist that causes pressure on your stomach.  Raise the head of your bed 6 to 8 inches with wood blocks to help you sleep. Extra pillows will not help.  Only take over-the-counter or prescription medicines for pain, discomfort, or fever as directed by your caregiver.   Do not take aspirin, ibuprofen, or other nonsteroidal anti-inflammatory drugs (NSAIDs). Continue Nuedexta and haldol Continue Zetia resolved No events on tele Continue medications as tolerated.  Call for assistance before getting out of bed.  Participate in activities as tolerated. Take all medications as prescribed.      Dementia with pseudobulbar palsy by history. No lability of affect off Nudexta (disconinued) - patient started on Namenda. Tolerating Seroquel with no EPS but occasional low BP.  Continue Seroquel for 4 more nights and have the psychiatrist at his rehab reassess need for continuation of Seroquel. You can help yourself with lifestyle changes (quitting smoking if you smoke), eat lots of fruits & vegetables & low fat dairy products, not a lot of meat & fatty foods, walk or some form of physical activity most days of the week, lose weight if you are overweight.  Take your cardiac medication as prescribed to lower cholesterol, to lower blood pressure, and control your blood sugar. Resolved Lay down with your feet up if you feel like you might faint; breath deeply until symptoms pass.  If you pass out, call 911 to be taken to the nearest emergency room.  Also call 911 to be taken to the nearest emergency room if you experience dizziness or lightheadedness, visual changes, speech changes, facial asymmetry, chest pain, abdominal pain, or back pain. Change the factors that you can control. Ask your caregiver for guidance concerning weight loss, quitting smoking, and alcohol consumption.  Avoid foods and drinks that make your symptoms worse, such as:   Caffeine or alcoholic drinks. Chocolate.  Peppermint or mint flavorings.  Garlic and onions.  Spicy foods.   Citrus fruits, such as oranges, guillermo, or limes.  Tomato-based foods such as sauce, chili, salsa, and pizza.  Fried and fatty foods.  Avoid lying down for the 3 hours prior to your bedtime or prior to taking a nap.  Eat small, frequent meals instead of large meals.  Wear loose-fitting clothing. Do not wear anything tight around your waist that causes pressure on your stomach.  Raise the head of your bed 6 to 8 inches with wood blocks to help you sleep. Extra pillows will not help.  Only take over-the-counter or prescription medicines for pain, discomfort, or fever as directed by your caregiver.  Do not take aspirin, ibuprofen, or other nonsteroidal anti-inflammatory drugs (NSAIDs).

## 2017-11-14 LAB
ANION GAP SERPL CALC-SCNC: 14 MMOL/L — SIGNIFICANT CHANGE UP (ref 5–17)
BUN SERPL-MCNC: 16 MG/DL — SIGNIFICANT CHANGE UP (ref 7–23)
CALCIUM SERPL-MCNC: 9.9 MG/DL — SIGNIFICANT CHANGE UP (ref 8.4–10.5)
CHLORIDE SERPL-SCNC: 102 MMOL/L — SIGNIFICANT CHANGE UP (ref 96–108)
CO2 SERPL-SCNC: 26 MMOL/L — SIGNIFICANT CHANGE UP (ref 22–31)
CREAT SERPL-MCNC: 1.06 MG/DL — SIGNIFICANT CHANGE UP (ref 0.5–1.3)
GLUCOSE SERPL-MCNC: 97 MG/DL — SIGNIFICANT CHANGE UP (ref 70–99)
HCT VFR BLD CALC: 43.8 % — SIGNIFICANT CHANGE UP (ref 39–50)
HGB BLD-MCNC: 15.4 G/DL — SIGNIFICANT CHANGE UP (ref 13–17)
MCHC RBC-ENTMCNC: 34.4 PG — HIGH (ref 27–34)
MCHC RBC-ENTMCNC: 35.2 GM/DL — SIGNIFICANT CHANGE UP (ref 32–36)
MCV RBC AUTO: 97.8 FL — SIGNIFICANT CHANGE UP (ref 80–100)
PLATELET # BLD AUTO: 193 K/UL — SIGNIFICANT CHANGE UP (ref 150–400)
POTASSIUM SERPL-MCNC: 4 MMOL/L — SIGNIFICANT CHANGE UP (ref 3.5–5.3)
POTASSIUM SERPL-SCNC: 4 MMOL/L — SIGNIFICANT CHANGE UP (ref 3.5–5.3)
RBC # BLD: 4.48 M/UL — SIGNIFICANT CHANGE UP (ref 4.2–5.8)
RBC # FLD: 13.8 % — SIGNIFICANT CHANGE UP (ref 10.3–14.5)
SODIUM SERPL-SCNC: 142 MMOL/L — SIGNIFICANT CHANGE UP (ref 135–145)
WBC # BLD: 6.95 K/UL — SIGNIFICANT CHANGE UP (ref 3.8–10.5)
WBC # FLD AUTO: 6.95 K/UL — SIGNIFICANT CHANGE UP (ref 3.8–10.5)

## 2017-11-14 RX ORDER — ACETAMINOPHEN 500 MG
650 TABLET ORAL ONCE
Qty: 0 | Refills: 0 | Status: COMPLETED | OUTPATIENT
Start: 2017-11-14 | End: 2017-11-14

## 2017-11-14 RX ADMIN — Medication 650 MILLIGRAM(S): at 07:30

## 2017-11-14 RX ADMIN — MIDODRINE HYDROCHLORIDE 5 MILLIGRAM(S): 2.5 TABLET ORAL at 17:25

## 2017-11-14 RX ADMIN — HALOPERIDOL DECANOATE 1 MILLIGRAM(S): 100 INJECTION INTRAMUSCULAR at 13:27

## 2017-11-14 RX ADMIN — Medication 650 MILLIGRAM(S): at 14:33

## 2017-11-14 RX ADMIN — Medication 81 MILLIGRAM(S): at 13:27

## 2017-11-14 RX ADMIN — HEPARIN SODIUM 5000 UNIT(S): 5000 INJECTION INTRAVENOUS; SUBCUTANEOUS at 17:20

## 2017-11-14 RX ADMIN — HALOPERIDOL DECANOATE 1 MILLIGRAM(S): 100 INJECTION INTRAMUSCULAR at 05:18

## 2017-11-14 RX ADMIN — MIDODRINE HYDROCHLORIDE 5 MILLIGRAM(S): 2.5 TABLET ORAL at 06:26

## 2017-11-14 RX ADMIN — Medication 100 MILLIGRAM(S): at 21:56

## 2017-11-14 RX ADMIN — HEPARIN SODIUM 5000 UNIT(S): 5000 INJECTION INTRAVENOUS; SUBCUTANEOUS at 06:26

## 2017-11-14 RX ADMIN — Medication 100 MILLIGRAM(S): at 06:26

## 2017-11-14 RX ADMIN — FAMOTIDINE 20 MILLIGRAM(S): 10 INJECTION INTRAVENOUS at 06:26

## 2017-11-14 RX ADMIN — FAMOTIDINE 20 MILLIGRAM(S): 10 INJECTION INTRAVENOUS at 17:23

## 2017-11-14 RX ADMIN — Medication 100 MILLIGRAM(S): at 15:33

## 2017-11-14 RX ADMIN — Medication 650 MILLIGRAM(S): at 06:26

## 2017-11-14 RX ADMIN — MEMANTINE HYDROCHLORIDE 10 MILLIGRAM(S): 10 TABLET ORAL at 21:56

## 2017-11-14 RX ADMIN — HALOPERIDOL DECANOATE 1 MILLIGRAM(S): 100 INJECTION INTRAMUSCULAR at 19:54

## 2017-11-14 RX ADMIN — Medication 650 MILLIGRAM(S): at 15:35

## 2017-11-14 NOTE — CONSULT NOTE ADULT - ASSESSMENT
Alzheimer dementia with Pseudobulbar affect by history  Now with behavioral agitation from Alzheimer dementia in a new environment  Rule out Nudexta causing dizziness and falls in addition to the hypotension.    Recommend  Haldol 1mg IV q6h prn agitation. Follow QTc and hold Haldol if QTc is 500ms or greater.  Avoid Nudexta given it's propensity to induce dizziness; he has a history of  multiple falls.  Avoid Aricept given his bradycardia and GI bleed history  Avoid serotonergic agents as he recently took Nudexta (it contains dextromethorphan and in combination with serotonin can induce a serotonin syndrome).  Social work consult. Upon discharge refer to outpt psych. at [facility] ([phone]).  Will follow with you here.     Filemon Braxton M.D.  Psychiatry  [phone]

## 2017-11-14 NOTE — CONSULT NOTE ADULT - SUBJECTIVE AND OBJECTIVE BOX
Chart reviewed and patient seen by undersigned    Asked to consult for agitation    Historians include chart, the pt. and his wife on the telephone (poor historians) and Pt's NP    History of Present Illness  The patient is a 83 year old WM with history of Alzheimer dementia with Pseudobulbar affect. Admitted here on 11/11/17 for fall. He has a history of multiple falls. Head CT is negative for bleed. Today he has been agitated and received PRN Haldol. His wife insists "he has all his faculties" and never had a psychiatric nor neurological history. She says that at home he was "fine. Quiet." She cannot state why he was given Nudexta but says it was stopped "recently" but cannot state the reason. In the hospital he has been bradycardic and hypotensive. He complained of dizziness at home.     Past Psychiatric History  As above.    Psychosocial History  No substance abuse history. Lives with wife. One son in a nursing home. The pt. says he is a retired .     PAST MEDICAL & SURGICAL HISTORY:  Alzheimer disease: with psuedobulbular affect  BPH (benign prostatic hypertrophy)  Renal calculi  Hyperlipidemia  GERD (gastroesophageal reflux disease)  Nephrolithiasis  H/O cataract extraction, right  S/P tonsillectomy      FAMILY HISTORY:  No pertinent family history in first degree relatives      Vital Signs Last 24 Hrs  T(C): 36.6 (14 Nov 2017 17:19), Max: 36.8 (14 Nov 2017 08:12)  T(F): 97.8 (14 Nov 2017 17:19), Max: 98.3 (14 Nov 2017 08:12)  HR: 86 (14 Nov 2017 17:19) (59 - 99)  BP: 130/84 (14 Nov 2017 17:19) (103/70 - 135/81)  BP(mean): --  RR: 19 (14 Nov 2017 17:19) (18 - 19)  SpO2: 95% (14 Nov 2017 17:19) (95% - 97%)                          15.4   6.95  )-----------( 193      ( 14 Nov 2017 07:37 )             43.8       11-14    142  |  102  |  16  ----------------------------<  97  4.0   |  26  |  1.06    Ca    9.9      14 Nov 2017 07:37    < from: CT Head No Cont (11.10.17 @ 21:38) >  EXAM:  CT BRAIN                          EXAM:  CT CERVICAL SPINE                            PROCEDURE DATE:  11/10/2017            INTERPRETATION:  HISTORY: Status post fall. Trauma.    TECHNIQUE: A noncontrast head CT and a noncontrast cervical spine CT were   performed. The head CT was performed with 5 mm axial images. The cervical   spine CT was performed with axial thin section images with sagittal and   coronal reformatted series.    COMPARISON: Head and cervical spine CTs 10/2/2017    FINDINGS:    Head CT: Partial repeat scanning was performed due to patient motion on   the initial images. There is no obvious acute intracranial hemorrhage,   large cortical infarct, mass effect or midline shift, given the extent of   artifact. Nonspecific mild periventricular and subcortical white matter   lucencies likely represent chronic microvascular ischemic changes. Again   noted are small lucencies in the left corona radiata and the left   cerebellum, which may represent age-indeterminate/old infarcts. There is   moderate cerebral volume loss with commensurate ventricular dilatation.     There is no depressed skull fracture. There is minimal mucosal thickening   of the sinuses. The left tympanomastoid region is clear. Again noted is   mild right inferior mastoid opacification. There is no significant   interval change.     Cervical spine CT: There is straightening of the cervical lordosis, which   may be related to patient positioning and/or muscle spasm. There is no   fracture or traumatic malalignment in the cervical spine. The vertebral   body heights are preserved in the cervical spine without a compression   deformity. The craniocervical junction, predentate and atlantodental   space are intact.  There is stable minimal anterolisthesis of C7 on T1.    There are multilevel degenerative changes characterized by uncovertebral   degenerative change, disc osteophyte complex and facet arthropathy in the   cervical spine with resultant neural foraminal narrowing. There is no   significant spinal canal stenosis.    The prevertebral soft tissue is within normal limits. There is no   hematoma in the visualized superficial soft tissue. Visualized lung   apices demonstrate right apical paraseptal emphysema without   pneumothorax.      Impression:     Head CT: No obvious acute intracranial hemorrhage or displaced skull   fracture, given the extent of artifact. If clinically indicated, a   short-term follow-up or an MRI may be obtained for further evaluation.      Cervical spine CT: No fracture or traumatic malalignment in the cervical   spine.  If clinically indicated, an MRI may be obtained for further   evaluation.     Multilevel degenerative changes of the cervical spine.                 DARRELL FRITZ M.D., RADIOLOGYRESIDENT  This document has been electronically signed.  SELAM MOHAMUD M.D., ATTENDING RADIOLOGIST  This document has been electronically signed. Nov 10 2017 10:01PM                < end of copied text >    QTc 401ms        MEDICATIONS  (STANDING):  aspirin enteric coated 81 milliGRAM(s) Oral daily  docusate sodium 100 milliGRAM(s) Oral three times a day  famotidine    Tablet 20 milliGRAM(s) Oral two times a day  heparin  Injectable 5000 Unit(s) SubCutaneous every 12 hours  memantine 10 milliGRAM(s) Oral at bedtime  midodrine 5 milliGRAM(s) Oral two times a day  sodium chloride 0.9%. 1000 milliLiter(s) (100 mL/Hr) IV Continuous <Continuous>    MEDICATIONS  (PRN):  haloperidol    Injectable 1 milliGRAM(s) IV Push every 6 hours PRN AGITATION  senna 2 Tablet(s) Oral at bedtime PRN Constipation      Elderly WM in a chair, calm, cooperative, alert and oriented x 3 .  No psychomotor abnormalities. Insight and judgment are fair. He says he is here because he fell.  Speech is coherent with slow rate and volume. No hallucinations nor delusions. The patient denied suicidal and homicidal ideation and plan. Mood is upset as he says his wife is not here.  His  affect is constricted.  Attention and concentration fair. Reduced short term memory and long term memory. He can name the President of the [place].     Suicidal risk assessment  Risk factors include being an elderly WM with history of dementia.  Protective factors include no plan, no past attempts, no psychosis, no substance abuse.

## 2017-11-15 DIAGNOSIS — R69 ILLNESS, UNSPECIFIED: ICD-10-CM

## 2017-11-15 LAB
ANION GAP SERPL CALC-SCNC: 16 MMOL/L — SIGNIFICANT CHANGE UP (ref 5–17)
BUN SERPL-MCNC: 19 MG/DL — SIGNIFICANT CHANGE UP (ref 7–23)
CALCIUM SERPL-MCNC: 9.9 MG/DL — SIGNIFICANT CHANGE UP (ref 8.4–10.5)
CHLORIDE SERPL-SCNC: 102 MMOL/L — SIGNIFICANT CHANGE UP (ref 96–108)
CO2 SERPL-SCNC: 23 MMOL/L — SIGNIFICANT CHANGE UP (ref 22–31)
CREAT SERPL-MCNC: 0.99 MG/DL — SIGNIFICANT CHANGE UP (ref 0.5–1.3)
GLUCOSE SERPL-MCNC: 92 MG/DL — SIGNIFICANT CHANGE UP (ref 70–99)
HCT VFR BLD CALC: 46.8 % — SIGNIFICANT CHANGE UP (ref 39–50)
HGB BLD-MCNC: 15.8 G/DL — SIGNIFICANT CHANGE UP (ref 13–17)
MCHC RBC-ENTMCNC: 33.8 GM/DL — SIGNIFICANT CHANGE UP (ref 32–36)
MCHC RBC-ENTMCNC: 35.1 PG — HIGH (ref 27–34)
MCV RBC AUTO: 104 FL — HIGH (ref 80–100)
PLATELET # BLD AUTO: 194 K/UL — SIGNIFICANT CHANGE UP (ref 150–400)
POTASSIUM SERPL-MCNC: 4.3 MMOL/L — SIGNIFICANT CHANGE UP (ref 3.5–5.3)
POTASSIUM SERPL-SCNC: 4.3 MMOL/L — SIGNIFICANT CHANGE UP (ref 3.5–5.3)
RBC # BLD: 4.51 M/UL — SIGNIFICANT CHANGE UP (ref 4.2–5.8)
RBC # FLD: 12.2 % — SIGNIFICANT CHANGE UP (ref 10.3–14.5)
SODIUM SERPL-SCNC: 141 MMOL/L — SIGNIFICANT CHANGE UP (ref 135–145)
WBC # BLD: 9.2 K/UL — SIGNIFICANT CHANGE UP (ref 3.8–10.5)
WBC # FLD AUTO: 9.2 K/UL — SIGNIFICANT CHANGE UP (ref 3.8–10.5)

## 2017-11-15 PROCEDURE — 93010 ELECTROCARDIOGRAM REPORT: CPT

## 2017-11-15 RX ORDER — FAMOTIDINE 10 MG/ML
1 INJECTION INTRAVENOUS
Qty: 0 | Refills: 0 | COMMUNITY
Start: 2017-11-15

## 2017-11-15 RX ORDER — DOCUSATE SODIUM 100 MG
1 CAPSULE ORAL
Qty: 0 | Refills: 0 | COMMUNITY
Start: 2017-11-15

## 2017-11-15 RX ORDER — MEMANTINE HYDROCHLORIDE 10 MG/1
1 TABLET ORAL
Qty: 0 | Refills: 0 | COMMUNITY
Start: 2017-11-15

## 2017-11-15 RX ORDER — HALOPERIDOL DECANOATE 100 MG/ML
0.5 INJECTION INTRAMUSCULAR EVERY 6 HOURS
Qty: 0 | Refills: 0 | Status: DISCONTINUED | OUTPATIENT
Start: 2017-11-15 | End: 2017-11-16

## 2017-11-15 RX ORDER — HALOPERIDOL DECANOATE 100 MG/ML
0.5 INJECTION INTRAMUSCULAR EVERY 6 HOURS
Qty: 0 | Refills: 0 | Status: DISCONTINUED | OUTPATIENT
Start: 2017-11-15 | End: 2017-11-15

## 2017-11-15 RX ADMIN — MIDODRINE HYDROCHLORIDE 5 MILLIGRAM(S): 2.5 TABLET ORAL at 17:30

## 2017-11-15 RX ADMIN — HEPARIN SODIUM 5000 UNIT(S): 5000 INJECTION INTRAVENOUS; SUBCUTANEOUS at 17:31

## 2017-11-15 RX ADMIN — HEPARIN SODIUM 5000 UNIT(S): 5000 INJECTION INTRAVENOUS; SUBCUTANEOUS at 05:29

## 2017-11-15 RX ADMIN — Medication 81 MILLIGRAM(S): at 13:41

## 2017-11-15 RX ADMIN — MEMANTINE HYDROCHLORIDE 10 MILLIGRAM(S): 10 TABLET ORAL at 20:55

## 2017-11-15 RX ADMIN — Medication 100 MILLIGRAM(S): at 05:30

## 2017-11-15 RX ADMIN — FAMOTIDINE 20 MILLIGRAM(S): 10 INJECTION INTRAVENOUS at 05:30

## 2017-11-15 RX ADMIN — FAMOTIDINE 20 MILLIGRAM(S): 10 INJECTION INTRAVENOUS at 17:30

## 2017-11-15 RX ADMIN — MIDODRINE HYDROCHLORIDE 5 MILLIGRAM(S): 2.5 TABLET ORAL at 05:30

## 2017-11-15 RX ADMIN — Medication 100 MILLIGRAM(S): at 13:41

## 2017-11-15 RX ADMIN — Medication 100 MILLIGRAM(S): at 20:55

## 2017-11-16 PROCEDURE — 93010 ELECTROCARDIOGRAM REPORT: CPT

## 2017-11-16 RX ORDER — QUETIAPINE FUMARATE 200 MG/1
25 TABLET, FILM COATED ORAL AT BEDTIME
Qty: 0 | Refills: 0 | Status: DISCONTINUED | OUTPATIENT
Start: 2017-11-16 | End: 2017-11-21

## 2017-11-16 RX ORDER — HALOPERIDOL DECANOATE 100 MG/ML
0.5 INJECTION INTRAMUSCULAR ONCE
Qty: 0 | Refills: 0 | Status: COMPLETED | OUTPATIENT
Start: 2017-11-16 | End: 2017-11-16

## 2017-11-16 RX ADMIN — QUETIAPINE FUMARATE 25 MILLIGRAM(S): 200 TABLET, FILM COATED ORAL at 21:05

## 2017-11-16 RX ADMIN — HALOPERIDOL DECANOATE 0.5 MILLIGRAM(S): 100 INJECTION INTRAMUSCULAR at 07:55

## 2017-11-16 RX ADMIN — Medication 100 MILLIGRAM(S): at 21:05

## 2017-11-16 RX ADMIN — HALOPERIDOL DECANOATE 0.5 MILLIGRAM(S): 100 INJECTION INTRAMUSCULAR at 10:41

## 2017-11-16 RX ADMIN — HEPARIN SODIUM 5000 UNIT(S): 5000 INJECTION INTRAVENOUS; SUBCUTANEOUS at 17:26

## 2017-11-16 RX ADMIN — MIDODRINE HYDROCHLORIDE 5 MILLIGRAM(S): 2.5 TABLET ORAL at 17:25

## 2017-11-16 RX ADMIN — FAMOTIDINE 20 MILLIGRAM(S): 10 INJECTION INTRAVENOUS at 17:26

## 2017-11-16 RX ADMIN — Medication 81 MILLIGRAM(S): at 13:18

## 2017-11-16 RX ADMIN — FAMOTIDINE 20 MILLIGRAM(S): 10 INJECTION INTRAVENOUS at 05:02

## 2017-11-16 RX ADMIN — MIDODRINE HYDROCHLORIDE 5 MILLIGRAM(S): 2.5 TABLET ORAL at 05:02

## 2017-11-16 RX ADMIN — Medication 100 MILLIGRAM(S): at 13:18

## 2017-11-16 RX ADMIN — HEPARIN SODIUM 5000 UNIT(S): 5000 INJECTION INTRAVENOUS; SUBCUTANEOUS at 05:02

## 2017-11-16 RX ADMIN — Medication 100 MILLIGRAM(S): at 05:02

## 2017-11-16 RX ADMIN — MEMANTINE HYDROCHLORIDE 10 MILLIGRAM(S): 10 TABLET ORAL at 21:05

## 2017-11-16 NOTE — DIETITIAN INITIAL EVALUATION ADULT. - OTHER INFO
pt seen for length of stay. pt confused at time of visit. PCA stated that pt with good intake (100%) of breakfast today. last BM 11/15 as per flow sheet. NKALDAIR

## 2017-11-16 NOTE — DIETITIAN INITIAL EVALUATION ADULT. - PROBLEM SELECTOR PLAN 4
- can be complication of midodrine, ranitidine  - will call for Loop recorder interrogation in AM  - hx of sinus pauses  - consider PPM placement evaluation  - given known hx of bradycardia and unlikely syncope as per history, will hold off tele for now until recorded interrogated.

## 2017-11-16 NOTE — PROVIDER CONTACT NOTE (OTHER) - SITUATION
patient confused combative. punching and kicking when trying assist patient.   haldol 0.5mg ivp given. patient still combative and hitting with hands
patient confused combative. punching and kicking when trying assist patient.   haldol 0.5mg ivp given. patient still combative and hitting with hands
Bradycardia with HR 42
Patient had a Low BP of 92/65 electronically at 1953 pm.
Pt VS Q4h. PT HR: 43.

## 2017-11-16 NOTE — DIETITIAN INITIAL EVALUATION ADULT. - NUTRITIONGOAL OUTCOME1
will receive and tolerate appropriate therapeutic diet and continue to consume at least 75% of meals

## 2017-11-16 NOTE — PROVIDER CONTACT NOTE (OTHER) - BACKGROUND
on enhanced supervision
Patient 83 years old male with H/O alzheimer dementia s/p fall.  Presented with fall head trauma in setting of hypotension and sinus bradycardia.
Pt is a 82 yo M adx for a fall that occurred at home. PMH: alzheimer's disease, BPH, renal calculi, GERD. Pt is A&Ox2-3.
admitted for bradycardia, s/p fall

## 2017-11-16 NOTE — DIETITIAN INITIAL EVALUATION ADULT. - PROBLEM SELECTOR PLAN 1
- PT eval  - check b12, a1c, vit D  - called house cards for Loop recorder interrogation only  - pt is at high risk for recurrent futue falls

## 2017-11-16 NOTE — DIETITIAN INITIAL EVALUATION ADULT. - PROBLEM SELECTOR PLAN 3
- unclear etiology, pt is on midodrine at home  - orthostats negative in ED  - cont home midodrine  - /hr for now  - r/o infection => CXR clear, u/a bland  - grossly normal LV/RV function on last TTE

## 2017-11-16 NOTE — PROVIDER CONTACT NOTE (OTHER) - ACTION/TREATMENT ORDERED:
will continue to montor
No new orders now. will continue to monitor.
No intervention needed at this time per NP. Will continue to monitor.
QUINTON Baxter ordered an EKG. EKG showed bradycardia.

## 2017-11-17 LAB
ANION GAP SERPL CALC-SCNC: 13 MMOL/L — SIGNIFICANT CHANGE UP (ref 5–17)
BUN SERPL-MCNC: 20 MG/DL — SIGNIFICANT CHANGE UP (ref 7–23)
CALCIUM SERPL-MCNC: 9.9 MG/DL — SIGNIFICANT CHANGE UP (ref 8.4–10.5)
CHLORIDE SERPL-SCNC: 103 MMOL/L — SIGNIFICANT CHANGE UP (ref 96–108)
CO2 SERPL-SCNC: 26 MMOL/L — SIGNIFICANT CHANGE UP (ref 22–31)
CREAT SERPL-MCNC: 1.09 MG/DL — SIGNIFICANT CHANGE UP (ref 0.5–1.3)
GLUCOSE SERPL-MCNC: 85 MG/DL — SIGNIFICANT CHANGE UP (ref 70–99)
HCT VFR BLD CALC: 47.7 % — SIGNIFICANT CHANGE UP (ref 39–50)
HGB BLD-MCNC: 15.5 G/DL — SIGNIFICANT CHANGE UP (ref 13–17)
MCHC RBC-ENTMCNC: 32.6 GM/DL — SIGNIFICANT CHANGE UP (ref 32–36)
MCHC RBC-ENTMCNC: 34 PG — SIGNIFICANT CHANGE UP (ref 27–34)
MCV RBC AUTO: 104 FL — HIGH (ref 80–100)
PLATELET # BLD AUTO: 180 K/UL — SIGNIFICANT CHANGE UP (ref 150–400)
POTASSIUM SERPL-MCNC: 4.4 MMOL/L — SIGNIFICANT CHANGE UP (ref 3.5–5.3)
POTASSIUM SERPL-SCNC: 4.4 MMOL/L — SIGNIFICANT CHANGE UP (ref 3.5–5.3)
RBC # BLD: 4.57 M/UL — SIGNIFICANT CHANGE UP (ref 4.2–5.8)
RBC # FLD: 12 % — SIGNIFICANT CHANGE UP (ref 10.3–14.5)
SODIUM SERPL-SCNC: 142 MMOL/L — SIGNIFICANT CHANGE UP (ref 135–145)
WBC # BLD: 7.6 K/UL — SIGNIFICANT CHANGE UP (ref 3.8–10.5)
WBC # FLD AUTO: 7.6 K/UL — SIGNIFICANT CHANGE UP (ref 3.8–10.5)

## 2017-11-17 PROCEDURE — 93010 ELECTROCARDIOGRAM REPORT: CPT

## 2017-11-17 PROCEDURE — 93005 ELECTROCARDIOGRAM TRACING: CPT

## 2017-11-17 PROCEDURE — G9001: CPT

## 2017-11-17 RX ADMIN — MIDODRINE HYDROCHLORIDE 5 MILLIGRAM(S): 2.5 TABLET ORAL at 17:21

## 2017-11-17 RX ADMIN — FAMOTIDINE 20 MILLIGRAM(S): 10 INJECTION INTRAVENOUS at 05:44

## 2017-11-17 RX ADMIN — QUETIAPINE FUMARATE 25 MILLIGRAM(S): 200 TABLET, FILM COATED ORAL at 21:02

## 2017-11-17 RX ADMIN — Medication 100 MILLIGRAM(S): at 12:16

## 2017-11-17 RX ADMIN — Medication 81 MILLIGRAM(S): at 12:14

## 2017-11-17 RX ADMIN — MEMANTINE HYDROCHLORIDE 10 MILLIGRAM(S): 10 TABLET ORAL at 21:02

## 2017-11-17 RX ADMIN — MIDODRINE HYDROCHLORIDE 5 MILLIGRAM(S): 2.5 TABLET ORAL at 05:44

## 2017-11-17 RX ADMIN — Medication 100 MILLIGRAM(S): at 21:02

## 2017-11-17 RX ADMIN — HEPARIN SODIUM 5000 UNIT(S): 5000 INJECTION INTRAVENOUS; SUBCUTANEOUS at 05:44

## 2017-11-17 RX ADMIN — FAMOTIDINE 20 MILLIGRAM(S): 10 INJECTION INTRAVENOUS at 17:21

## 2017-11-17 RX ADMIN — Medication 100 MILLIGRAM(S): at 05:44

## 2017-11-17 RX ADMIN — HEPARIN SODIUM 5000 UNIT(S): 5000 INJECTION INTRAVENOUS; SUBCUTANEOUS at 17:21

## 2017-11-18 LAB
ALBUMIN SERPL ELPH-MCNC: 4.4 G/DL — SIGNIFICANT CHANGE UP (ref 3.3–5)
ALP SERPL-CCNC: 83 U/L — SIGNIFICANT CHANGE UP (ref 40–120)
ALT FLD-CCNC: 32 U/L RC — SIGNIFICANT CHANGE UP (ref 10–45)
ANION GAP SERPL CALC-SCNC: 12 MMOL/L — SIGNIFICANT CHANGE UP (ref 5–17)
AST SERPL-CCNC: 23 U/L — SIGNIFICANT CHANGE UP (ref 10–40)
BASOPHILS # BLD AUTO: 0.1 K/UL — SIGNIFICANT CHANGE UP (ref 0–0.2)
BASOPHILS NFR BLD AUTO: 0.8 % — SIGNIFICANT CHANGE UP (ref 0–2)
BILIRUB SERPL-MCNC: 0.5 MG/DL — SIGNIFICANT CHANGE UP (ref 0.2–1.2)
BUN SERPL-MCNC: 19 MG/DL — SIGNIFICANT CHANGE UP (ref 7–23)
CALCIUM SERPL-MCNC: 9.9 MG/DL — SIGNIFICANT CHANGE UP (ref 8.4–10.5)
CHLORIDE SERPL-SCNC: 102 MMOL/L — SIGNIFICANT CHANGE UP (ref 96–108)
CO2 SERPL-SCNC: 29 MMOL/L — SIGNIFICANT CHANGE UP (ref 22–31)
CREAT SERPL-MCNC: 1.04 MG/DL — SIGNIFICANT CHANGE UP (ref 0.5–1.3)
EOSINOPHIL # BLD AUTO: 0.1 K/UL — SIGNIFICANT CHANGE UP (ref 0–0.5)
EOSINOPHIL NFR BLD AUTO: 1 % — SIGNIFICANT CHANGE UP (ref 0–6)
GLUCOSE SERPL-MCNC: 95 MG/DL — SIGNIFICANT CHANGE UP (ref 70–99)
HCT VFR BLD CALC: 46.6 % — SIGNIFICANT CHANGE UP (ref 39–50)
HGB BLD-MCNC: 15.7 G/DL — SIGNIFICANT CHANGE UP (ref 13–17)
LYMPHOCYTES # BLD AUTO: 2 K/UL — SIGNIFICANT CHANGE UP (ref 1–3.3)
LYMPHOCYTES # BLD AUTO: 29.4 % — SIGNIFICANT CHANGE UP (ref 13–44)
MCHC RBC-ENTMCNC: 33.7 GM/DL — SIGNIFICANT CHANGE UP (ref 32–36)
MCHC RBC-ENTMCNC: 35 PG — HIGH (ref 27–34)
MCV RBC AUTO: 104 FL — HIGH (ref 80–100)
MONOCYTES # BLD AUTO: 0.7 K/UL — SIGNIFICANT CHANGE UP (ref 0–0.9)
MONOCYTES NFR BLD AUTO: 10.1 % — SIGNIFICANT CHANGE UP (ref 2–14)
NEUTROPHILS # BLD AUTO: 4 K/UL — SIGNIFICANT CHANGE UP (ref 1.8–7.4)
NEUTROPHILS NFR BLD AUTO: 58.6 % — SIGNIFICANT CHANGE UP (ref 43–77)
PLATELET # BLD AUTO: 181 K/UL — SIGNIFICANT CHANGE UP (ref 150–400)
POTASSIUM SERPL-MCNC: 4.6 MMOL/L — SIGNIFICANT CHANGE UP (ref 3.5–5.3)
POTASSIUM SERPL-SCNC: 4.6 MMOL/L — SIGNIFICANT CHANGE UP (ref 3.5–5.3)
PROT SERPL-MCNC: 7.5 G/DL — SIGNIFICANT CHANGE UP (ref 6–8.3)
RBC # BLD: 4.48 M/UL — SIGNIFICANT CHANGE UP (ref 4.2–5.8)
RBC # FLD: 12.2 % — SIGNIFICANT CHANGE UP (ref 10.3–14.5)
SODIUM SERPL-SCNC: 143 MMOL/L — SIGNIFICANT CHANGE UP (ref 135–145)
WBC # BLD: 6.8 K/UL — SIGNIFICANT CHANGE UP (ref 3.8–10.5)
WBC # FLD AUTO: 6.8 K/UL — SIGNIFICANT CHANGE UP (ref 3.8–10.5)

## 2017-11-18 RX ADMIN — MIDODRINE HYDROCHLORIDE 5 MILLIGRAM(S): 2.5 TABLET ORAL at 06:10

## 2017-11-18 RX ADMIN — FAMOTIDINE 20 MILLIGRAM(S): 10 INJECTION INTRAVENOUS at 06:10

## 2017-11-18 RX ADMIN — MEMANTINE HYDROCHLORIDE 10 MILLIGRAM(S): 10 TABLET ORAL at 21:39

## 2017-11-18 RX ADMIN — FAMOTIDINE 20 MILLIGRAM(S): 10 INJECTION INTRAVENOUS at 17:02

## 2017-11-18 RX ADMIN — Medication 81 MILLIGRAM(S): at 11:29

## 2017-11-18 RX ADMIN — Medication 100 MILLIGRAM(S): at 06:10

## 2017-11-18 RX ADMIN — MIDODRINE HYDROCHLORIDE 5 MILLIGRAM(S): 2.5 TABLET ORAL at 17:02

## 2017-11-18 RX ADMIN — Medication 100 MILLIGRAM(S): at 21:39

## 2017-11-18 RX ADMIN — HEPARIN SODIUM 5000 UNIT(S): 5000 INJECTION INTRAVENOUS; SUBCUTANEOUS at 06:10

## 2017-11-18 RX ADMIN — QUETIAPINE FUMARATE 25 MILLIGRAM(S): 200 TABLET, FILM COATED ORAL at 21:39

## 2017-11-18 RX ADMIN — Medication 100 MILLIGRAM(S): at 11:29

## 2017-11-18 RX ADMIN — HEPARIN SODIUM 5000 UNIT(S): 5000 INJECTION INTRAVENOUS; SUBCUTANEOUS at 17:02

## 2017-11-19 LAB
ANION GAP SERPL CALC-SCNC: 14 MMOL/L — SIGNIFICANT CHANGE UP (ref 5–17)
BUN SERPL-MCNC: 23 MG/DL — SIGNIFICANT CHANGE UP (ref 7–23)
CALCIUM SERPL-MCNC: 9.8 MG/DL — SIGNIFICANT CHANGE UP (ref 8.4–10.5)
CHLORIDE SERPL-SCNC: 103 MMOL/L — SIGNIFICANT CHANGE UP (ref 96–108)
CO2 SERPL-SCNC: 25 MMOL/L — SIGNIFICANT CHANGE UP (ref 22–31)
CREAT SERPL-MCNC: 1.14 MG/DL — SIGNIFICANT CHANGE UP (ref 0.5–1.3)
GLUCOSE SERPL-MCNC: 88 MG/DL — SIGNIFICANT CHANGE UP (ref 70–99)
HCT VFR BLD CALC: 47.5 % — SIGNIFICANT CHANGE UP (ref 39–50)
HGB BLD-MCNC: 15.6 G/DL — SIGNIFICANT CHANGE UP (ref 13–17)
MCHC RBC-ENTMCNC: 32.9 GM/DL — SIGNIFICANT CHANGE UP (ref 32–36)
MCHC RBC-ENTMCNC: 34.3 PG — HIGH (ref 27–34)
MCV RBC AUTO: 104 FL — HIGH (ref 80–100)
PLATELET # BLD AUTO: 176 K/UL — SIGNIFICANT CHANGE UP (ref 150–400)
POTASSIUM SERPL-MCNC: 4.4 MMOL/L — SIGNIFICANT CHANGE UP (ref 3.5–5.3)
POTASSIUM SERPL-SCNC: 4.4 MMOL/L — SIGNIFICANT CHANGE UP (ref 3.5–5.3)
RBC # BLD: 4.56 M/UL — SIGNIFICANT CHANGE UP (ref 4.2–5.8)
RBC # FLD: 12.3 % — SIGNIFICANT CHANGE UP (ref 10.3–14.5)
SODIUM SERPL-SCNC: 142 MMOL/L — SIGNIFICANT CHANGE UP (ref 135–145)
WBC # BLD: 7.8 K/UL — SIGNIFICANT CHANGE UP (ref 3.8–10.5)
WBC # FLD AUTO: 7.8 K/UL — SIGNIFICANT CHANGE UP (ref 3.8–10.5)

## 2017-11-19 RX ADMIN — FAMOTIDINE 20 MILLIGRAM(S): 10 INJECTION INTRAVENOUS at 05:25

## 2017-11-19 RX ADMIN — MIDODRINE HYDROCHLORIDE 5 MILLIGRAM(S): 2.5 TABLET ORAL at 05:25

## 2017-11-19 RX ADMIN — QUETIAPINE FUMARATE 25 MILLIGRAM(S): 200 TABLET, FILM COATED ORAL at 21:02

## 2017-11-19 RX ADMIN — Medication 81 MILLIGRAM(S): at 12:09

## 2017-11-19 RX ADMIN — Medication 100 MILLIGRAM(S): at 21:02

## 2017-11-19 RX ADMIN — HEPARIN SODIUM 5000 UNIT(S): 5000 INJECTION INTRAVENOUS; SUBCUTANEOUS at 17:01

## 2017-11-19 RX ADMIN — HEPARIN SODIUM 5000 UNIT(S): 5000 INJECTION INTRAVENOUS; SUBCUTANEOUS at 05:25

## 2017-11-19 RX ADMIN — MEMANTINE HYDROCHLORIDE 10 MILLIGRAM(S): 10 TABLET ORAL at 21:02

## 2017-11-19 RX ADMIN — Medication 100 MILLIGRAM(S): at 12:09

## 2017-11-19 RX ADMIN — Medication 100 MILLIGRAM(S): at 05:25

## 2017-11-19 RX ADMIN — FAMOTIDINE 20 MILLIGRAM(S): 10 INJECTION INTRAVENOUS at 17:00

## 2017-11-19 RX ADMIN — MIDODRINE HYDROCHLORIDE 5 MILLIGRAM(S): 2.5 TABLET ORAL at 17:00

## 2017-11-20 PROCEDURE — 93010 ELECTROCARDIOGRAM REPORT: CPT

## 2017-11-20 RX ORDER — QUETIAPINE FUMARATE 200 MG/1
1 TABLET, FILM COATED ORAL
Qty: 0 | Refills: 0 | COMMUNITY
Start: 2017-11-20

## 2017-11-20 RX ADMIN — MIDODRINE HYDROCHLORIDE 5 MILLIGRAM(S): 2.5 TABLET ORAL at 05:17

## 2017-11-20 RX ADMIN — Medication 100 MILLIGRAM(S): at 05:17

## 2017-11-20 RX ADMIN — Medication 100 MILLIGRAM(S): at 20:54

## 2017-11-20 RX ADMIN — HEPARIN SODIUM 5000 UNIT(S): 5000 INJECTION INTRAVENOUS; SUBCUTANEOUS at 17:44

## 2017-11-20 RX ADMIN — QUETIAPINE FUMARATE 25 MILLIGRAM(S): 200 TABLET, FILM COATED ORAL at 20:54

## 2017-11-20 RX ADMIN — HEPARIN SODIUM 5000 UNIT(S): 5000 INJECTION INTRAVENOUS; SUBCUTANEOUS at 05:17

## 2017-11-20 RX ADMIN — MIDODRINE HYDROCHLORIDE 5 MILLIGRAM(S): 2.5 TABLET ORAL at 17:45

## 2017-11-20 RX ADMIN — FAMOTIDINE 20 MILLIGRAM(S): 10 INJECTION INTRAVENOUS at 05:17

## 2017-11-20 RX ADMIN — MEMANTINE HYDROCHLORIDE 10 MILLIGRAM(S): 10 TABLET ORAL at 20:54

## 2017-11-20 RX ADMIN — FAMOTIDINE 20 MILLIGRAM(S): 10 INJECTION INTRAVENOUS at 17:44

## 2017-11-20 RX ADMIN — Medication 81 MILLIGRAM(S): at 16:20

## 2017-11-20 RX ADMIN — Medication 100 MILLIGRAM(S): at 16:20

## 2017-11-21 VITALS
SYSTOLIC BLOOD PRESSURE: 121 MMHG | TEMPERATURE: 98 F | RESPIRATION RATE: 19 BRPM | DIASTOLIC BLOOD PRESSURE: 75 MMHG | HEART RATE: 77 BPM | OXYGEN SATURATION: 97 %

## 2017-11-21 RX ORDER — MEMANTINE HYDROCHLORIDE 10 MG/1
1 TABLET ORAL
Qty: 0 | Refills: 0 | COMMUNITY
Start: 2017-11-21

## 2017-11-21 RX ADMIN — HEPARIN SODIUM 5000 UNIT(S): 5000 INJECTION INTRAVENOUS; SUBCUTANEOUS at 05:00

## 2017-11-21 RX ADMIN — Medication 100 MILLIGRAM(S): at 13:00

## 2017-11-21 RX ADMIN — FAMOTIDINE 20 MILLIGRAM(S): 10 INJECTION INTRAVENOUS at 04:59

## 2017-11-21 RX ADMIN — FAMOTIDINE 20 MILLIGRAM(S): 10 INJECTION INTRAVENOUS at 17:36

## 2017-11-21 RX ADMIN — MIDODRINE HYDROCHLORIDE 5 MILLIGRAM(S): 2.5 TABLET ORAL at 17:36

## 2017-11-21 RX ADMIN — MIDODRINE HYDROCHLORIDE 5 MILLIGRAM(S): 2.5 TABLET ORAL at 04:59

## 2017-11-21 RX ADMIN — Medication 100 MILLIGRAM(S): at 04:59

## 2017-11-21 RX ADMIN — Medication 81 MILLIGRAM(S): at 13:00

## 2017-11-21 RX ADMIN — HEPARIN SODIUM 5000 UNIT(S): 5000 INJECTION INTRAVENOUS; SUBCUTANEOUS at 17:36

## 2017-11-21 NOTE — PROGRESS NOTE ADULT - PROVIDER SPECIALTY LIST ADULT
Internal Medicine
Psychiatry
Internal Medicine

## 2017-11-21 NOTE — PROGRESS NOTE ADULT - ASSESSMENT
s/p fall  dementia    continue current care  f/u ekg  d/c planning to rehab
Better behavior control.       Recommend  Continue with Seroquel 25mg p.o. qhs and hold if QTc is 500ms or greater or BP less than 90/60.   Have psychiatrist at his rehab follow him there. He may not require continuation of Seroquel by the end of this week if stable behaviorally.     Filemon Braxton M.D.  Psychiatry  [phone]
Dementia with behavioral disturbance.  Haldol induced flat affect      Recommend  Reduce the dose of prn Haldol to 0.50mg IV q8h prn agitation  Follow QTc    Filemon Braxton M.D.  Psychiatry  [phone]
Dementia with behavioral disturbance. History of Pseudobulabar Palsy. Rule out delirium.  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     Recommend  D/C Haldol  If QTc is under 500ms, start Seroquel 25mg p.o. qhs. Hold Seroquel if hypotensive.    Filemon Braxton M.D.  Psychiatry  [phone]
Dementia with behavioral disturbance. Less EPS with Seroquel.    Recommend  Continue with Seroquel. Follow QTc and hold Seroquel if QTc is 500ms or greater or if he is hypotensive.  Awaiting Rehab.    Filemon Braxton M.D.  Psychiatry  [phone]
Dementia with pseudobulbar palsy by history. No lability of affect off Nudexta. Tolerating Seroquel with no EPS but occasional low BP.    Recommend  Continue Seroquel for 4 more nights and have the psychiatrist at his rehab reassess need for continuation of Seroquel.    Filemon Braxton M.D.  Psychiatry  [phone]
dementia  s/p fall  bradycardia    continue current care  d/c haldol  appreciate psych input  d/c pt home once wife arrives to pick pt up.
s/p fall  dementia    continue current care  appreciate psychj input  d/c planning
s/p fall  dementia    continue current care  continue meds  fall precautions  d/c to rehab
s/p fall  dementia  bradycardia    continue current care  d/c planning to rehab
s/p fall at home  urinary complaints    continue current care  d/c tele  check post void sono  d/c planning
s/p fall at home  urinary complaints - resolved.    continue current care  attempt to place on enhanced supervision  psych eval if needed  d/c planning
s/p falls  bradycardia  ams    appreciate psych input  continue current care  d/c planning jose enrique
s/p falls  chronic bradycardia    continue current care  d/c to rehab once bed available
s/p falls  dementia    continue current care  d/c planning to rehab

## 2017-11-21 NOTE — PROGRESS NOTE ADULT - SUBJECTIVE AND OBJECTIVE BOX
MEDICINE, PROGRESS NOTE [phone]    EVELIN CHAVEZ 83y [id number]    Patient seen and examined.  Patient is a 83y old  Male who presents with a chief complaint of fall (13 Nov 2017 09:13)  Pt has no current complaints.    PAST MEDICAL & SURGICAL HISTORY:  Alzheimer disease: with psuedobulbular affect  BPH (benign prostatic hypertrophy)  Renal calculi  Hyperlipidemia  GERD (gastroesophageal reflux disease)  Nephrolithiasis  H/O cataract extraction, right  S/P tonsillectomy    MEDICATIONS  (STANDING):  aspirin enteric coated 81 milliGRAM(s) Oral daily  docusate sodium 100 milliGRAM(s) Oral three times a day  famotidine    Tablet 20 milliGRAM(s) Oral two times a day  heparin  Injectable 5000 Unit(s) SubCutaneous every 12 hours  memantine 10 milliGRAM(s) Oral at bedtime  midodrine 5 milliGRAM(s) Oral two times a day  QUEtiapine 25 milliGRAM(s) Oral at bedtime  sodium chloride 0.9%. 1000 milliLiter(s) (100 mL/Hr) IV Continuous <Continuous>    MEDICATIONS  (PRN):  senna 2 Tablet(s) Oral at bedtime PRN Constipation    Allergies    IV dye (Rash)  penicillin (Rash)    Intolerances        PHYSICAL EXAM:  Constitutional: NAD  HEENT: Normocephalic, EOMI  Neck:  No JVD  Respiratory: CTA B/L, No wheezes  Cardiovascular: S1, S2, RRR, + systolic murmur  Gastrointestinal: BS+, soft, NT/ND  Extremities: No peripheral edema  Neurological: AAOX3, no focal deficits  Psychiatric: Normal mood, normal affect  : No Barone    Vital Signs Last 24 Hrs  T(C): 36.7 (17 Nov 2017 13:57), Max: 36.7 (17 Nov 2017 05:42)  T(F): 98.1 (17 Nov 2017 13:57), Max: 98.1 (17 Nov 2017 13:57)  HR: 88 (17 Nov 2017 13:57) (81 - 88)  BP: 138/82 (17 Nov 2017 13:57) (110/71 - 138/82)  BP(mean): --  RR: 18 (17 Nov 2017 13:57) (17 - 18)  SpO2: 95% (17 Nov 2017 13:57) (95% - 98%)  I&O's Summary    16 Nov 2017 07:01  -  17 Nov 2017 07:00  --------------------------------------------------------  IN: 420 mL / OUT: 600 mL / NET: -180 mL    17 Nov 2017 07:01  -  17 Nov 2017 16:29  --------------------------------------------------------  IN: 600 mL / OUT: 0 mL / NET: 600 mL        LABS:                        15.5   7.6   )-----------( 180      ( 17 Nov 2017 06:11 )             47.7     11-17    142  |  103  |  20  ----------------------------<  85  4.4   |  26  |  1.09    Ca    9.9      17 Nov 2017 06:11
Events noted. Agitated last night and required prn Haldols. Nurses aid tells me he is calm today. Ate breakfast.       Vital Signs Last 24 Hrs  T(C): 36.7 (15 Nov 2017 05:35), Max: 36.7 (14 Nov 2017 19:53)  T(F): 98.1 (15 Nov 2017 05:35), Max: 98.1 (14 Nov 2017 19:53)  HR: 96 (15 Nov 2017 05:35) (86 - 99)  BP: 128/81 (15 Nov 2017 05:35) (115/78 - 145/87)  BP(mean): --  RR: 18 (15 Nov 2017 05:35) (18 - 19)  SpO2: 95% (15 Nov 2017 05:35) (95% - 97%)                          15.8   9.2   )-----------( 194      ( 15 Nov 2017 06:56 )             46.8       11-15    141  |  102  |  19  ----------------------------<  92  4.3   |  23  |  0.99    Ca    9.9      15 Nov 2017 06:56                MEDICATIONS  (STANDING):  aspirin enteric coated 81 milliGRAM(s) Oral daily  docusate sodium 100 milliGRAM(s) Oral three times a day  famotidine    Tablet 20 milliGRAM(s) Oral two times a day  heparin  Injectable 5000 Unit(s) SubCutaneous every 12 hours  memantine 10 milliGRAM(s) Oral at bedtime  midodrine 5 milliGRAM(s) Oral two times a day  sodium chloride 0.9%. 1000 milliLiter(s) (100 mL/Hr) IV Continuous <Continuous>    MEDICATIONS  (PRN):  haloperidol    Injectable 1 milliGRAM(s) IV Push every 6 hours PRN AGITATION  senna 2 Tablet(s) Oral at bedtime PRN Constipation      Elderly WM in chair, initially asleep, then awakened to touch, calm, cooperative, alert and oriented x 3 . Slow in responses. No cogwheel rigidity. Insight and judgment are reduced. Speech is coherent with normal rate and low volume. No hallucinations nor delusions. The patient denied suicidal and homicidal ideation and plan. Mood is euthymic and affect flat. Attention and concentration fair with reduced short term memory and long term memory.
Events noted. His RN tells me no agitation over the past 24 hours. Tolerating Seroquel 25mg qhs though BP 96/61. Eats well. Off Haldol.       Vital Signs Last 24 Hrs  T(C): 36.5 (20 Nov 2017 12:23), Max: 36.8 (19 Nov 2017 14:33)  T(F): 97.7 (20 Nov 2017 12:23), Max: 98.3 (19 Nov 2017 14:33)  HR: 61 (20 Nov 2017 12:23) (58 - 97)  BP: 96/61 (20 Nov 2017 12:23) (96/61 - 120/75)  BP(mean): --  RR: 18 (20 Nov 2017 12:23) (18 - 18)  SpO2: 94% (20 Nov 2017 12:23) (94% - 98%)                          15.6   7.8   )-----------( 176      ( 19 Nov 2017 06:58 )             47.5       11-19    142  |  103  |  23  ----------------------------<  88  4.4   |  25  |  1.14    Ca    9.8      19 Nov 2017 06:58                MEDICATIONS  (STANDING):  aspirin enteric coated 81 milliGRAM(s) Oral daily  docusate sodium 100 milliGRAM(s) Oral three times a day  famotidine    Tablet 20 milliGRAM(s) Oral two times a day  heparin  Injectable 5000 Unit(s) SubCutaneous every 12 hours  memantine 10 milliGRAM(s) Oral at bedtime  midodrine 5 milliGRAM(s) Oral two times a day  QUEtiapine 25 milliGRAM(s) Oral at bedtime  sodium chloride 0.9%. 1000 milliLiter(s) (100 mL/Hr) IV Continuous <Continuous>    MEDICATIONS  (PRN):  senna 2 Tablet(s) Oral at bedtime PRN Constipation      Elderly WM in chair sitting up eating lunch. He offers no complaints. Does not want to talk to me today. No tremors/diaphoresis.
Events noted. Per RN, pt tries to get out of his chair at times. Otherwise, no agitation. He offers no complaints. Slept well with Seroquel. Off Haldol.       Vital Signs Last 24 Hrs  T(C): 36.7 (17 Nov 2017 13:57), Max: 36.7 (17 Nov 2017 05:42)  T(F): 98.1 (17 Nov 2017 13:57), Max: 98.1 (17 Nov 2017 13:57)  HR: 88 (17 Nov 2017 13:57) (81 - 88)  BP: 138/82 (17 Nov 2017 13:57) (110/71 - 138/82)  BP(mean): --  RR: 18 (17 Nov 2017 13:57) (17 - 18)  SpO2: 95% (17 Nov 2017 13:57) (95% - 98%)                          15.5   7.6   )-----------( 180      ( 17 Nov 2017 06:11 )             47.7       11-17    142  |  103  |  20  ----------------------------<  85  4.4   |  26  |  1.09    Ca    9.9      17 Nov 2017 06:11                MEDICATIONS  (STANDING):  aspirin enteric coated 81 milliGRAM(s) Oral daily  docusate sodium 100 milliGRAM(s) Oral three times a day  famotidine    Tablet 20 milliGRAM(s) Oral two times a day  heparin  Injectable 5000 Unit(s) SubCutaneous every 12 hours  memantine 10 milliGRAM(s) Oral at bedtime  midodrine 5 milliGRAM(s) Oral two times a day  QUEtiapine 25 milliGRAM(s) Oral at bedtime  sodium chloride 0.9%. 1000 milliLiter(s) (100 mL/Hr) IV Continuous <Continuous>    MEDICATIONS  (PRN):  senna 2 Tablet(s) Oral at bedtime PRN Constipation      Elderly WFM in chair, calm, cooperative, alert and oriented x 3  .  No psychomotor abnormalities. Insight and judgment are impaired. Speech is coherent with normal rate and low volume. No hallucinations nor delusions. The patient denied suicidal and homicidal ideation and plan. Mood is euthymic and affect constricted.  Attention and concentration fair but impaired short term memory  and long term memory.    Given supportive psychotherapy.
Events noted. Per his RN, he has been calm. Eats and sleeps well.       Vital Signs Last 24 Hrs  T(C): 36.8 (21 Nov 2017 04:12), Max: 36.8 (21 Nov 2017 04:12)  T(F): 98.2 (21 Nov 2017 04:12), Max: 98.2 (21 Nov 2017 04:12)  HR: 63 (21 Nov 2017 04:12) (55 - 73)  BP: 104/78 (21 Nov 2017 04:12) (96/61 - 113/74)  BP(mean): --  RR: 18 (21 Nov 2017 04:12) (18 - 18)  SpO2: 96% (21 Nov 2017 04:12) (94% - 99%)                        MEDICATIONS  (STANDING):  aspirin enteric coated 81 milliGRAM(s) Oral daily  docusate sodium 100 milliGRAM(s) Oral three times a day  famotidine    Tablet 20 milliGRAM(s) Oral two times a day  heparin  Injectable 5000 Unit(s) SubCutaneous every 12 hours  memantine 10 milliGRAM(s) Oral at bedtime  midodrine 5 milliGRAM(s) Oral two times a day  QUEtiapine 25 milliGRAM(s) Oral at bedtime  sodium chloride 0.9%. 1000 milliLiter(s) (100 mL/Hr) IV Continuous <Continuous>    MEDICATIONS  (PRN):  senna 2 Tablet(s) Oral at bedtime PRN Constipation      Elderly WM in chair, calm, cooperative, alert and oriented x 2-3 (he says it is "Sunday" today)  .  No psychomotor abnormalities. Insight and judgment are fair. Speech is coherent with normal rate and volume. No hallucinations nor delusions. The patient denied suicidal and homicidal ideation and plan. Mood is euthymic and affect full range and appropriate. Attention and concentration fair. Impaired short term memory. Long term memory within normal limits.
Events noted. Received Haldol today. Calm now. He eats well.  RN tells me the pt is not always Oriented x3.       Vital Signs Last 24 Hrs  T(C): 36.7 (16 Nov 2017 13:30), Max: 37.3 (15 Nov 2017 20:15)  T(F): 98.1 (16 Nov 2017 13:30), Max: 99.1 (15 Nov 2017 20:15)  HR: 72 (16 Nov 2017 13:30) (66 - 99)  BP: 115/77 (16 Nov 2017 13:30) (115/77 - 126/75)  BP(mean): --  RR: 16 (16 Nov 2017 13:30) (16 - 18)  SpO2: 97% (16 Nov 2017 13:30) (92% - 97%)                          15.8   9.2   )-----------( 194      ( 15 Nov 2017 06:56 )             46.8       11-15    141  |  102  |  19  ----------------------------<  92  4.3   |  23  |  0.99    Ca    9.9      15 Nov 2017 06:56                MEDICATIONS  (STANDING):  aspirin enteric coated 81 milliGRAM(s) Oral daily  docusate sodium 100 milliGRAM(s) Oral three times a day  famotidine    Tablet 20 milliGRAM(s) Oral two times a day  heparin  Injectable 5000 Unit(s) SubCutaneous every 12 hours  memantine 10 milliGRAM(s) Oral at bedtime  midodrine 5 milliGRAM(s) Oral two times a day  sodium chloride 0.9%. 1000 milliLiter(s) (100 mL/Hr) IV Continuous <Continuous>    MEDICATIONS  (PRN):  senna 2 Tablet(s) Oral at bedtime PRN Constipation      Elderly WM in bed, calm, cooperative, alert and oriented x3. No cogwheel rigidity.   No psychomotor abnormalities. Insight and judgment are fair. Speech is coherent with normal rate and low volume. No hallucinations nor delusions. The patient denied suicidal and homicidal ideation and plan. Mood is euthymic and affect flat. Attention and concentration, short term memory, and long term memory within normal limits.
MEDICINE, PROGRESS NOTE [phone]    EVELIN CHAVEZ 83y [id number]    Patient seen and examined.  Patient is a 83y old  Male who presents with a chief complaint of fall (13 Nov 2017 09:13)  Pt pleasantly confused.    PAST MEDICAL & SURGICAL HISTORY:  Alzheimer disease: with psuedobulbular affect  BPH (benign prostatic hypertrophy)  Renal calculi  Hyperlipidemia  GERD (gastroesophageal reflux disease)  Nephrolithiasis  H/O cataract extraction, right  S/P tonsillectomy    MEDICATIONS  (STANDING):  aspirin enteric coated 81 milliGRAM(s) Oral daily  docusate sodium 100 milliGRAM(s) Oral three times a day  famotidine    Tablet 20 milliGRAM(s) Oral two times a day  heparin  Injectable 5000 Unit(s) SubCutaneous every 12 hours  memantine 10 milliGRAM(s) Oral at bedtime  midodrine 5 milliGRAM(s) Oral two times a day  sodium chloride 0.9%. 1000 milliLiter(s) (100 mL/Hr) IV Continuous <Continuous>    MEDICATIONS  (PRN):  haloperidol    Injectable 1 milliGRAM(s) IV Push every 6 hours PRN AGITATION  senna 2 Tablet(s) Oral at bedtime PRN Constipation    Allergies    IV dye (Rash)  penicillin (Rash)    Intolerances        PHYSICAL EXAM:  Constitutional: NAD  HEENT: Normocephalic, EOMI  Neck:  No JVD  Respiratory: CTA B/L, No wheezes  Cardiovascular: S1, S2, daniel  Gastrointestinal: BS+, soft, NT/ND  Extremities: No peripheral edema  Neurological: AAOX3, no focal deficits  Psychiatric: Normal mood, normal affect  : No Barone    Vital Signs Last 24 Hrs  T(C): 36.6 (14 Nov 2017 21:00), Max: 36.8 (14 Nov 2017 08:12)  T(F): 97.9 (14 Nov 2017 21:00), Max: 98.3 (14 Nov 2017 08:12)  HR: 87 (14 Nov 2017 21:00) (59 - 99)  BP: 145/87 (14 Nov 2017 21:00) (103/70 - 145/87)  BP(mean): --  RR: 18 (14 Nov 2017 21:00) (18 - 19)  SpO2: 97% (14 Nov 2017 21:00) (95% - 97%)  I&O's Summary    13 Nov 2017 07:01  -  14 Nov 2017 07:00  --------------------------------------------------------  IN: 845 mL / OUT: 990 mL / NET: -145 mL    14 Nov 2017 07:01  -  14 Nov 2017 22:26  --------------------------------------------------------  IN: 690 mL / OUT: 700 mL / NET: -10 mL        LABS:                        15.4   6.95  )-----------( 193      ( 14 Nov 2017 07:37 )             43.8     11-14    142  |  102  |  16  ----------------------------<  97  4.0   |  26  |  1.06    Ca    9.9      14 Nov 2017 07:37
MEDICINE, PROGRESS NOTE [phone]    EVELIN CHAVEZ 83y [id number]    Patient seen and examined.  Patient is a 83y old  Male who presents with a chief complaint of fall (13 Nov 2017 09:13)  Pt has no current complaints. Pleasantly confused    PAST MEDICAL & SURGICAL HISTORY:  Alzheimer disease: with psuedobulbular affect  BPH (benign prostatic hypertrophy)  Renal calculi  Hyperlipidemia  GERD (gastroesophageal reflux disease)  Nephrolithiasis  H/O cataract extraction, right  S/P tonsillectomy    MEDICATIONS  (STANDING):  aspirin enteric coated 81 milliGRAM(s) Oral daily  docusate sodium 100 milliGRAM(s) Oral three times a day  famotidine    Tablet 20 milliGRAM(s) Oral two times a day  heparin  Injectable 5000 Unit(s) SubCutaneous every 12 hours  memantine 10 milliGRAM(s) Oral at bedtime  midodrine 5 milliGRAM(s) Oral two times a day  QUEtiapine 25 milliGRAM(s) Oral at bedtime  sodium chloride 0.9%. 1000 milliLiter(s) (100 mL/Hr) IV Continuous <Continuous>    MEDICATIONS  (PRN):  senna 2 Tablet(s) Oral at bedtime PRN Constipation    Allergies    IV dye (Rash)  penicillin (Rash)    Intolerances        PHYSICAL EXAM:  Constitutional: NAD  HEENT: Normocephalic, EOMI  Neck:  No JVD  Respiratory: CTA B/L, No wheezes  Cardiovascular: S1, S2, RRR, + systolic murmur  Gastrointestinal: BS+, soft, NT/ND  Extremities: No peripheral edema  Neurological: AAOX2, no focal deficits  Psychiatric: Normal mood, normal affect  : No Barone    Vital Signs Last 24 Hrs  T(C): 36.8 (19 Nov 2017 14:33), Max: 36.8 (19 Nov 2017 14:33)  T(F): 98.3 (19 Nov 2017 14:33), Max: 98.3 (19 Nov 2017 14:33)  HR: 97 (19 Nov 2017 14:33) (59 - 97)  BP: 119/80 (19 Nov 2017 14:33) (103/69 - 119/80)  BP(mean): --  RR: 18 (19 Nov 2017 14:33) (18 - 18)  SpO2: 98% (19 Nov 2017 14:33) (93% - 98%)  I&O's Summary    18 Nov 2017 07:01  -  19 Nov 2017 07:00  --------------------------------------------------------  IN: 1080 mL / OUT: 200 mL / NET: 880 mL    19 Nov 2017 07:01  -  19 Nov 2017 19:05  --------------------------------------------------------  IN: 360 mL / OUT: 225 mL / NET: 135 mL        LABS:                        15.6   7.8   )-----------( 176      ( 19 Nov 2017 06:58 )             47.5     11-19    142  |  103  |  23  ----------------------------<  88  4.4   |  25  |  1.14    Ca    9.8      19 Nov 2017 06:58    TPro  7.5  /  Alb  4.4  /  TBili  0.5  /  DBili  x   /  AST  23  /  ALT  32  /  AlkPhos  83  11-18
MEDICINE, PROGRESS NOTE [phone]    EVELIN CHAVEZ 83y [id number]    Patient seen and examined.  Patient is a 83y old  Male who presents with a chief complaint of fall (2017 00:15)  Pt c/o difficulty urinating and pain upon urinating. Pt states he constantly has urge to go to bathroom.    PAST MEDICAL & SURGICAL HISTORY:  Alzheimer disease: with psuedobulbular affect  BPH (benign prostatic hypertrophy)  Renal calculi  Hyperlipidemia  GERD (gastroesophageal reflux disease)  Nephrolithiasis  H/O cataract extraction, right  S/P tonsillectomy    MEDICATIONS  (STANDING):  aspirin enteric coated 81 milliGRAM(s) Oral daily  docusate sodium 100 milliGRAM(s) Oral three times a day  famotidine    Tablet 20 milliGRAM(s) Oral two times a day  heparin  Injectable 5000 Unit(s) SubCutaneous every 12 hours  memantine 10 milliGRAM(s) Oral at bedtime  midodrine 5 milliGRAM(s) Oral two times a day  sodium chloride 0.9%. 1000 milliLiter(s) (100 mL/Hr) IV Continuous <Continuous>    MEDICATIONS  (PRN):  haloperidol    Injectable 1 milliGRAM(s) IV Push every 6 hours PRN AGITATION  senna 2 Tablet(s) Oral at bedtime PRN Constipation    Allergies    IV dye (Rash)  penicillin (Rash)    Intolerances        PHYSICAL EXAM:  Constitutional: NAD  HEENT: Normocephalic, EOMI  Neck:  No JVD  Respiratory: CTA B/L, No wheezes  Cardiovascular: S1, S2, RRR, + systolic murmur  Gastrointestinal: BS+, soft, NT/ND  Extremities: No peripheral edema  Neurological: AAOX3, no focal deficits  Psychiatric: Normal mood, normal affect  : No Barone    Vital Signs Last 24 Hrs  T(C): 36.4 (2017 11:20), Max: 37.3 (2017 17:47)  T(F): 97.5 (2017 11:20), Max: 99.2 (2017 17:47)  HR: 59 (2017 11:20) (51 - 87)  BP: 136/67 (2017 11:20) (115/70 - 155/76)  BP(mean): --  RR: 18 (2017 11:20) (18 - 18)  SpO2: 96% (2017 11:20) (94% - 97%)  I&O's Summary    2017 07:01  -  2017 07:00  --------------------------------------------------------  IN: 1820 mL / OUT: 180 mL / NET: 1640 mL    2017 07:01  -  2017 16:31  --------------------------------------------------------  IN: 440 mL / OUT: 500 mL / NET: -60 mL        LABS:                        13.5   7.43  )-----------( 176      ( 2017 08:24 )             39.8     -12    142  |  103  |  13  ----------------------------<  91  3.7   |  27  |  1.14    Ca    9.9      2017 08:25  Phos  2.9     11-11  Mg     2.0     11-11    TPro  7.1  /  Alb  4.3  /  TBili  0.6  /  DBili  x   /  AST  26  /  ALT  35  /  AlkPhos  86  11-11    CARDIAC MARKERS ( 2017 03:20 )  x     / <0.01 ng/mL / 89 U/L / x     / 5.6 ng/mL  CARDIAC MARKERS ( 10 Nov 2017 20:56 )  x     / <0.01 ng/mL / x     / x     / 4.6 ng/mL        Urinalysis Basic - ( 10 Nov 2017 22:13 )    Color: Yellow / Appearance: Clear / S.020 / pH: x  Gluc: x / Ketone: Negative  / Bili: Negative / Urobili: Negative   Blood: x / Protein: Trace / Nitrite: Negative   Leuk Esterase: Negative / RBC: x / WBC x   Sq Epi: x / Non Sq Epi: x / Bacteria: x
MEDICINE, PROGRESS NOTE [phone]    EVELIN CHAVEZ 83y [id number]    Patient was seen and examined prior to d/c to rehab.  Patient is a 83y old  Male who presents with a chief complaint of fall (13 Nov 2017 09:13)  Pt pleasantly confused, no new complaints.    PAST MEDICAL & SURGICAL HISTORY:  Alzheimer disease: with psuedobulbular affect  BPH (benign prostatic hypertrophy)  Renal calculi  Hyperlipidemia  GERD (gastroesophageal reflux disease)  Nephrolithiasis  H/O cataract extraction, right  S/P tonsillectomy    MEDICATIONS  (STANDING):  aspirin enteric coated 81 milliGRAM(s) Oral daily  docusate sodium 100 milliGRAM(s) Oral three times a day  famotidine    Tablet 20 milliGRAM(s) Oral two times a day  heparin  Injectable 5000 Unit(s) SubCutaneous every 12 hours  memantine 10 milliGRAM(s) Oral at bedtime  midodrine 5 milliGRAM(s) Oral two times a day  QUEtiapine 25 milliGRAM(s) Oral at bedtime  sodium chloride 0.9%. 1000 milliLiter(s) (100 mL/Hr) IV Continuous <Continuous>    MEDICATIONS  (PRN):  senna 2 Tablet(s) Oral at bedtime PRN Constipation    Allergies    IV dye (Rash)  penicillin (Rash)    Intolerances        PHYSICAL EXAM:  Constitutional: NAD  HEENT: Normocephalic, EOMI  Neck:  No JVD  Respiratory: CTA B/L, No wheezes  Cardiovascular: S1, S2, daniel  Gastrointestinal: BS+, soft, NT/ND  Extremities: No peripheral edema  Neurological: AAOX3, no focal deficits  Psychiatric: Normal mood, normal affect  : No Barone    Vital Signs Last 24 Hrs  T(C): 36.5 (21 Nov 2017 14:05), Max: 36.8 (21 Nov 2017 04:12)  T(F): 97.7 (21 Nov 2017 14:05), Max: 98.2 (21 Nov 2017 04:12)  HR: 77 (21 Nov 2017 14:05) (63 - 77)  BP: 121/75 (21 Nov 2017 14:05) (104/78 - 121/75)  BP(mean): --  RR: 19 (21 Nov 2017 14:05) (18 - 19)  SpO2: 97% (21 Nov 2017 14:05) (96% - 97%)  I&O's Summary    20 Nov 2017 07:01  -  21 Nov 2017 07:00  --------------------------------------------------------  IN: 1020 mL / OUT: 1800 mL / NET: -780 mL    21 Nov 2017 07:01  -  21 Nov 2017 20:32  --------------------------------------------------------  IN: 360 mL / OUT: 375 mL / NET: -15 mL
MEDICINE, PROGRESS NOTE [phone]    EVELIN CHAVEZ 83y [id number]    Patient seen and examined.  Patient is a 83y old  Male who presents with a chief complaint of fall (13 Nov 2017 09:13)  Pt pleasantly confused.    PAST MEDICAL & SURGICAL HISTORY:  Alzheimer disease: with psuedobulbular affect  BPH (benign prostatic hypertrophy)  Renal calculi  Hyperlipidemia  GERD (gastroesophageal reflux disease)  Nephrolithiasis  H/O cataract extraction, right  S/P tonsillectomy    MEDICATIONS  (STANDING):  aspirin enteric coated 81 milliGRAM(s) Oral daily  docusate sodium 100 milliGRAM(s) Oral three times a day  famotidine    Tablet 20 milliGRAM(s) Oral two times a day  heparin  Injectable 5000 Unit(s) SubCutaneous every 12 hours  memantine 10 milliGRAM(s) Oral at bedtime  midodrine 5 milliGRAM(s) Oral two times a day  sodium chloride 0.9%. 1000 milliLiter(s) (100 mL/Hr) IV Continuous <Continuous>    MEDICATIONS  (PRN):  haloperidol    Injectable 0.5 milliGRAM(s) IV Push every 6 hours PRN agitation  senna 2 Tablet(s) Oral at bedtime PRN Constipation    Allergies    IV dye (Rash)  penicillin (Rash)    Intolerances        PHYSICAL EXAM:  Constitutional: NAD  HEENT: Normocephalic, EOMI  Neck:  No JVD  Respiratory: CTA B/L, No wheezes  Cardiovascular: S1, S2, daniel, + systolic murmur  Gastrointestinal: BS+, soft, NT/ND  Extremities: No peripheral edema  Neurological: AAOX2, no focal deficits  Psychiatric: flat affect  : No Barone    Vital Signs Last 24 Hrs  T(C): 36.5 (15 Nov 2017 13:06), Max: 36.7 (14 Nov 2017 19:53)  T(F): 97.7 (15 Nov 2017 13:06), Max: 98.1 (14 Nov 2017 19:53)  HR: 69 (15 Nov 2017 13:06) (69 - 96)  BP: 116/79 (15 Nov 2017 13:06) (116/79 - 145/87)  BP(mean): --  RR: 16 (15 Nov 2017 13:06) (16 - 18)  SpO2: 98% (15 Nov 2017 13:06) (95% - 98%)  I&O's Summary    14 Nov 2017 07:01  -  15 Nov 2017 07:00  --------------------------------------------------------  IN: 810 mL / OUT: 900 mL / NET: -90 mL    15 Nov 2017 07:01  -  15 Nov 2017 18:00  --------------------------------------------------------  IN: 720 mL / OUT: 350 mL / NET: 370 mL        LABS:                        15.8   9.2   )-----------( 194      ( 15 Nov 2017 06:56 )             46.8     11-15    141  |  102  |  19  ----------------------------<  92  4.3   |  23  |  0.99    Ca    9.9      15 Nov 2017 06:56
MEDICINE, PROGRESS NOTE [phone]    EVELIN CHAVEZ 83y [id number]    Patient seen and examined.  Patient is a 83y old  Male who presents with a chief complaint of fall (13 Nov 2017 09:13)  pt pleasantly confused    PAST MEDICAL & SURGICAL HISTORY:  Alzheimer disease: with psuedobulbular affect  BPH (benign prostatic hypertrophy)  Renal calculi  Hyperlipidemia  GERD (gastroesophageal reflux disease)  Nephrolithiasis  H/O cataract extraction, right  S/P tonsillectomy    MEDICATIONS  (STANDING):  aspirin enteric coated 81 milliGRAM(s) Oral daily  docusate sodium 100 milliGRAM(s) Oral three times a day  famotidine    Tablet 20 milliGRAM(s) Oral two times a day  heparin  Injectable 5000 Unit(s) SubCutaneous every 12 hours  memantine 10 milliGRAM(s) Oral at bedtime  midodrine 5 milliGRAM(s) Oral two times a day  QUEtiapine 25 milliGRAM(s) Oral at bedtime  sodium chloride 0.9%. 1000 milliLiter(s) (100 mL/Hr) IV Continuous <Continuous>    MEDICATIONS  (PRN):  senna 2 Tablet(s) Oral at bedtime PRN Constipation    Allergies    IV dye (Rash)  penicillin (Rash)    Intolerances        PHYSICAL EXAM:  Constitutional: NAD  HEENT: Normocephalic, EOMI  Neck:  No JVD  Respiratory: CTA B/L, No wheezes  Cardiovascular: S1, S2, daniel  Gastrointestinal: BS+, soft, NT/ND  Extremities: No peripheral edema  Neurological: AAOX3, no focal deficits  Psychiatric: Normal mood, normal affect  : No Barone    Vital Signs Last 24 Hrs  T(C): 36.5 (20 Nov 2017 12:23), Max: 36.6 (20 Nov 2017 04:54)  T(F): 97.7 (20 Nov 2017 12:23), Max: 97.8 (20 Nov 2017 04:54)  HR: 59 (20 Nov 2017 16:49) (55 - 75)  BP: 102/66 (20 Nov 2017 16:49) (96/61 - 120/75)  BP(mean): --  RR: 18 (20 Nov 2017 12:23) (18 - 18)  SpO2: 97% (20 Nov 2017 15:58) (94% - 97%)  I&O's Summary    19 Nov 2017 07:01  -  20 Nov 2017 07:00  --------------------------------------------------------  IN: 600 mL / OUT: 1200 mL / NET: -600 mL    20 Nov 2017 07:01  -  20 Nov 2017 19:31  --------------------------------------------------------  IN: 780 mL / OUT: 975 mL / NET: -195 mL        LABS:                        15.6   7.8   )-----------( 176      ( 19 Nov 2017 06:58 )             47.5     11-19    142  |  103  |  23  ----------------------------<  88  4.4   |  25  |  1.14    Ca    9.8      19 Nov 2017 06:58
MEDICINE, PROGRESS NOTE [phone]    EVELIN CHAVEZ 83y [id number]    Patient seen and examined.  Patient is a 83y old  Male who presents with a chief complaint of fall (13 Nov 2017 09:13)  Pt pleasantly confused.    PAST MEDICAL & SURGICAL HISTORY:  Alzheimer disease: with psuedobulbular affect  BPH (benign prostatic hypertrophy)  Renal calculi  Hyperlipidemia  GERD (gastroesophageal reflux disease)  Nephrolithiasis  H/O cataract extraction, right  S/P tonsillectomy    MEDICATIONS  (STANDING):  aspirin enteric coated 81 milliGRAM(s) Oral daily  docusate sodium 100 milliGRAM(s) Oral three times a day  famotidine    Tablet 20 milliGRAM(s) Oral two times a day  heparin  Injectable 5000 Unit(s) SubCutaneous every 12 hours  memantine 10 milliGRAM(s) Oral at bedtime  midodrine 5 milliGRAM(s) Oral two times a day  QUEtiapine 25 milliGRAM(s) Oral at bedtime  sodium chloride 0.9%. 1000 milliLiter(s) (100 mL/Hr) IV Continuous <Continuous>    MEDICATIONS  (PRN):  senna 2 Tablet(s) Oral at bedtime PRN Constipation    Allergies    IV dye (Rash)  penicillin (Rash)    Intolerances        PHYSICAL EXAM:  Constitutional: NAD  HEENT: Normocephalic, EOMI  Neck:  No JVD  Respiratory: CTA B/L, No wheezes  Cardiovascular: S1, S2, RRR, + systolic murmur  Gastrointestinal: BS+, soft, NT/ND  Extremities: No peripheral edema  Neurological: AAOX3, no focal deficits  Psychiatric: Normal mood, normal affect  : No Barone    Vital Signs Last 24 Hrs  T(C): 36.7 (16 Nov 2017 13:30), Max: 37.3 (15 Nov 2017 20:15)  T(F): 98.1 (16 Nov 2017 13:30), Max: 99.1 (15 Nov 2017 20:15)  HR: 72 (16 Nov 2017 13:30) (66 - 99)  BP: 115/77 (16 Nov 2017 13:30) (115/77 - 126/75)  BP(mean): --  RR: 16 (16 Nov 2017 13:30) (16 - 18)  SpO2: 97% (16 Nov 2017 13:30) (92% - 97%)  I&O's Summary    15 Nov 2017 07:01  -  16 Nov 2017 07:00  --------------------------------------------------------  IN: 1020 mL / OUT: 450 mL / NET: 570 mL    16 Nov 2017 07:01  -  16 Nov 2017 19:03  --------------------------------------------------------  IN: 240 mL / OUT: 200 mL / NET: 40 mL        LABS:                        15.8   9.2   )-----------( 194      ( 15 Nov 2017 06:56 )             46.8     11-15    141  |  102  |  19  ----------------------------<  92  4.3   |  23  |  0.99    Ca    9.9      15 Nov 2017 06:56
MEDICINE, PROGRESS NOTE [phone]    EVELIN CHAVEZ 83y [id number]    Patient seen and examined.  Patient is a 83y old  Male who presents with a chief complaint of fall (13 Nov 2017 09:13)  Pt has no current complaints      PAST MEDICAL & SURGICAL HISTORY:  Alzheimer disease: with psuedobulbular affect  BPH (benign prostatic hypertrophy)  Renal calculi  Hyperlipidemia  GERD (gastroesophageal reflux disease)  Nephrolithiasis  H/O cataract extraction, right  S/P tonsillectomy    MEDICATIONS  (STANDING):  aspirin enteric coated 81 milliGRAM(s) Oral daily  docusate sodium 100 milliGRAM(s) Oral three times a day  famotidine    Tablet 20 milliGRAM(s) Oral two times a day  heparin  Injectable 5000 Unit(s) SubCutaneous every 12 hours  memantine 10 milliGRAM(s) Oral at bedtime  midodrine 5 milliGRAM(s) Oral two times a day  QUEtiapine 25 milliGRAM(s) Oral at bedtime  sodium chloride 0.9%. 1000 milliLiter(s) (100 mL/Hr) IV Continuous <Continuous>    MEDICATIONS  (PRN):  senna 2 Tablet(s) Oral at bedtime PRN Constipation    Allergies    IV dye (Rash)  penicillin (Rash)    Intolerances        PHYSICAL EXAM:  Constitutional: NAD  HEENT: Normocephalic, EOMI  Neck:  No JVD  Respiratory: CTA B/L, No wheezes  Cardiovascular: S1, S2, RRR, + systolic murmur  Gastrointestinal: BS+, soft, NT/ND  Extremities: No peripheral edema  Neurological: AAOX2, no focal deficits  Psychiatric: Normal mood, normal affect  : No Barone    Vital Signs Last 24 Hrs  T(C): 36.4 (18 Nov 2017 13:17), Max: 37 (18 Nov 2017 05:03)  T(F): 97.5 (18 Nov 2017 13:17), Max: 98.6 (18 Nov 2017 05:03)  HR: 72 (18 Nov 2017 13:17) (54 - 98)  BP: 101/72 (18 Nov 2017 13:17) (101/72 - 106/68)  BP(mean): --  RR: 18 (18 Nov 2017 13:17) (18 - 18)  SpO2: 94% (18 Nov 2017 13:17) (94% - 96%)  I&O's Summary    17 Nov 2017 07:01  -  18 Nov 2017 07:00  --------------------------------------------------------  IN: 1000 mL / OUT: 0 mL / NET: 1000 mL    18 Nov 2017 07:01  -  18 Nov 2017 15:29  --------------------------------------------------------  IN: 840 mL / OUT: 200 mL / NET: 640 mL        LABS:                        15.7   6.8   )-----------( 181      ( 18 Nov 2017 06:16 )             46.6     11-18    143  |  102  |  19  ----------------------------<  95  4.6   |  29  |  1.04    Ca    9.9      18 Nov 2017 06:16    TPro  7.5  /  Alb  4.4  /  TBili  0.5  /  DBili  x   /  AST  23  /  ALT  32  /  AlkPhos  83  11-18
MEDICINE, PROGRESS NOTE [phone]    EVELIN CHAVEZ 83y [id number]    Patient seen and examined.  Patient is a 83y old  Male who presents with a chief complaint of fall (13 Nov 2017 09:13)  Pt pleasantly confused. Pt had aggressive behavior overnight and this am and was placed on 1:1.      PAST MEDICAL & SURGICAL HISTORY:  Alzheimer disease: with psuedobulbular affect  BPH (benign prostatic hypertrophy)  Renal calculi  Hyperlipidemia  GERD (gastroesophageal reflux disease)  Nephrolithiasis  H/O cataract extraction, right  S/P tonsillectomy    MEDICATIONS  (STANDING):  aspirin enteric coated 81 milliGRAM(s) Oral daily  docusate sodium 100 milliGRAM(s) Oral three times a day  famotidine    Tablet 20 milliGRAM(s) Oral two times a day  heparin  Injectable 5000 Unit(s) SubCutaneous every 12 hours  memantine 10 milliGRAM(s) Oral at bedtime  midodrine 5 milliGRAM(s) Oral two times a day  sodium chloride 0.9%. 1000 milliLiter(s) (100 mL/Hr) IV Continuous <Continuous>    MEDICATIONS  (PRN):  haloperidol    Injectable 1 milliGRAM(s) IV Push every 6 hours PRN AGITATION  senna 2 Tablet(s) Oral at bedtime PRN Constipation    Allergies    IV dye (Rash)  penicillin (Rash)    Intolerances        PHYSICAL EXAM:  Constitutional: NAD  HEENT: Normocephalic, EOMI  Neck:  No JVD  Respiratory: CTA B/L, No wheezes  Cardiovascular: S1, S2, RRR, + systolic murmur  Gastrointestinal: BS+, soft, NT/ND  Extremities: No peripheral edema  Neurological: AAOX3, no focal deficits  Psychiatric: Normal mood, flat affect  : No Barone    Vital Signs Last 24 Hrs  T(C): 36.4 (13 Nov 2017 12:26), Max: 36.7 (13 Nov 2017 08:32)  T(F): 97.6 (13 Nov 2017 12:26), Max: 98.1 (13 Nov 2017 08:32)  HR: 64 (13 Nov 2017 12:26) (51 - 82)  BP: 118/72 (13 Nov 2017 12:26) (92/65 - 125/73)  BP(mean): 74 (12 Nov 2017 19:53) (74 - 74)  RR: 18 (13 Nov 2017 12:26) (18 - 18)  SpO2: 96% (13 Nov 2017 12:26) (95% - 97%)  I&O's Summary    12 Nov 2017 07:01  -  13 Nov 2017 07:00  --------------------------------------------------------  IN: 920 mL / OUT: 1200 mL / NET: -280 mL    13 Nov 2017 07:01  -  13 Nov 2017 16:57  --------------------------------------------------------  IN: 520 mL / OUT: 850 mL / NET: -330 mL        LABS:                        13.3   5.97  )-----------( 180      ( 13 Nov 2017 07:23 )             39.0     11-13    139  |  102  |  17  ----------------------------<  90  3.9   |  23  |  1.01    Ca    9.6      13 Nov 2017 07:28    < from: CT Cervical Spine No Cont (11.10.17 @ 21:38) >  EXAM:  CT BRAIN                          EXAM:  CT CERVICAL SPINE                            PROCEDURE DATE:  11/10/2017            INTERPRETATION:  HISTORY: Status post fall. Trauma.    TECHNIQUE: A noncontrast head CT and a noncontrast cervical spine CT were   performed. The head CT was performed with 5 mm axial images. The cervical   spine CT was performed with axial thin section images with sagittal and   coronal reformatted series.    COMPARISON: Head and cervical spine CTs 10/2/2017    FINDINGS:    Head CT: Partial repeat scanning was performed due to patient motion on   the initial images. There is no obvious acute intracranial hemorrhage,   large cortical infarct, mass effect or midline shift, given the extent of   artifact. Nonspecific mild periventricular and subcortical white matter   lucencies likely represent chronic microvascular ischemic changes. Again   noted are small lucencies in the left corona radiata and the left   cerebellum, which may represent age-indeterminate/old infarcts. There is   moderate cerebral volume loss with commensurate ventricular dilatation.     There is no depressed skull fracture. There is minimal mucosal thickening   of the sinuses. The left tympanomastoid region is clear. Again noted is   mild right inferior mastoid opacification. There is no significant   interval change.     Cervical spine CT: There is straightening of the cervical lordosis, which   may be related to patient positioning and/or muscle spasm. There is no   fracture or traumatic malalignment in the cervical spine. The vertebral   body heights are preserved in the cervical spine without a compression   deformity. The craniocervical junction, predentate and atlantodental   space are intact.  There is stable minimal anterolisthesis of C7 on T1.    There are multilevel degenerative changes characterized by uncovertebral   degenerative change, disc osteophyte complex and facet arthropathy in the   cervical spine with resultant neural foraminal narrowing. There is no   significant spinal canal stenosis.    The prevertebral soft tissue is within normal limits. There is no   hematoma in the visualized superficial soft tissue. Visualized lung   apices demonstrate right apical paraseptal emphysema without   pneumothorax.      Impression:     Head CT: No obvious acute intracranial hemorrhage or displaced skull   fracture, given the extent of artifact. If clinically indicated, a   short-term follow-up or an MRI may be obtained for further evaluation.      Cervical spine CT: No fracture or traumatic malalignment in the cervical   spine.  If clinically indicated, an MRI may be obtained for further   evaluation.     Multilevel degenerative changes of the cervical spine.                 DARRELL FRITZ M.D., RADIOLOGYRESIDENT  This document has been electronically signed.  SELAM MOHAMUD M.D., ATTENDING RADIOLOGIST  This document has been electronically signed. Nov 10 2017 10:01PM                < end of copied text >

## 2017-11-26 ENCOUNTER — EMERGENCY (EMERGENCY)
Facility: HOSPITAL | Age: 82
LOS: 1 days | Discharge: HOME | End: 2017-11-26
Attending: EMERGENCY MEDICINE | Admitting: EMERGENCY MEDICINE
Payer: MEDICARE

## 2017-11-26 VITALS
OXYGEN SATURATION: 99 % | SYSTOLIC BLOOD PRESSURE: 114 MMHG | TEMPERATURE: 98 F | DIASTOLIC BLOOD PRESSURE: 75 MMHG | RESPIRATION RATE: 15 BRPM | HEART RATE: 69 BPM

## 2017-11-26 VITALS
SYSTOLIC BLOOD PRESSURE: 116 MMHG | HEART RATE: 92 BPM | RESPIRATION RATE: 16 BRPM | TEMPERATURE: 98 F | OXYGEN SATURATION: 96 % | DIASTOLIC BLOOD PRESSURE: 83 MMHG

## 2017-11-26 DIAGNOSIS — Z98.89 OTHER SPECIFIED POSTPROCEDURAL STATES: Chronic | ICD-10-CM

## 2017-11-26 DIAGNOSIS — N20.0 CALCULUS OF KIDNEY: Chronic | ICD-10-CM

## 2017-11-26 PROCEDURE — 71045 X-RAY EXAM CHEST 1 VIEW: CPT

## 2017-11-26 PROCEDURE — 70450 CT HEAD/BRAIN W/O DYE: CPT

## 2017-11-26 PROCEDURE — 96372 THER/PROPH/DIAG INJ SC/IM: CPT | Mod: XU

## 2017-11-26 PROCEDURE — 90715 TDAP VACCINE 7 YRS/> IM: CPT

## 2017-11-26 PROCEDURE — 99284 EMERGENCY DEPT VISIT MOD MDM: CPT | Mod: GC

## 2017-11-26 PROCEDURE — 72170 X-RAY EXAM OF PELVIS: CPT

## 2017-11-26 PROCEDURE — 90471 IMMUNIZATION ADMIN: CPT

## 2017-11-26 PROCEDURE — 72170 X-RAY EXAM OF PELVIS: CPT | Mod: 26

## 2017-11-26 PROCEDURE — 99284 EMERGENCY DEPT VISIT MOD MDM: CPT | Mod: 25

## 2017-11-26 PROCEDURE — 71010: CPT | Mod: 26

## 2017-11-26 PROCEDURE — 96374 THER/PROPH/DIAG INJ IV PUSH: CPT

## 2017-11-26 PROCEDURE — 70450 CT HEAD/BRAIN W/O DYE: CPT | Mod: 26

## 2017-11-26 PROCEDURE — 72125 CT NECK SPINE W/O DYE: CPT | Mod: 26

## 2017-11-26 PROCEDURE — 72125 CT NECK SPINE W/O DYE: CPT

## 2017-11-26 RX ORDER — HALOPERIDOL DECANOATE 100 MG/ML
2 INJECTION INTRAMUSCULAR ONCE
Qty: 0 | Refills: 0 | Status: COMPLETED | OUTPATIENT
Start: 2017-11-26 | End: 2017-11-26

## 2017-11-26 RX ORDER — TETANUS TOXOID, REDUCED DIPHTHERIA TOXOID AND ACELLULAR PERTUSSIS VACCINE, ADSORBED 5; 2.5; 8; 8; 2.5 [IU]/.5ML; [IU]/.5ML; UG/.5ML; UG/.5ML; UG/.5ML
0.5 SUSPENSION INTRAMUSCULAR ONCE
Qty: 0 | Refills: 0 | Status: COMPLETED | OUTPATIENT
Start: 2017-11-26 | End: 2017-11-26

## 2017-11-26 RX ADMIN — HALOPERIDOL DECANOATE 2 MILLIGRAM(S): 100 INJECTION INTRAMUSCULAR at 09:08

## 2017-11-26 RX ADMIN — Medication 1 MILLIGRAM(S): at 10:12

## 2017-11-26 RX ADMIN — HALOPERIDOL DECANOATE 2 MILLIGRAM(S): 100 INJECTION INTRAMUSCULAR at 12:25

## 2017-11-26 RX ADMIN — TETANUS TOXOID, REDUCED DIPHTHERIA TOXOID AND ACELLULAR PERTUSSIS VACCINE, ADSORBED 0.5 MILLILITER(S): 5; 2.5; 8; 8; 2.5 SUSPENSION INTRAMUSCULAR at 07:26

## 2017-11-26 NOTE — ED PROVIDER NOTE - ATTENDING CONTRIBUTION TO CARE
I have seen and evaluated this patient with the resident.   I agree with the findings  unless other wise stated.  I have made appropriate changes in documentations where needed, After my face to face bedside evaluation, I am further  notinyo male with dementia coming from a NH with h/o fall thia AM no external bleeding apparent No scalp hematoma normal motor and sensory function  with head injury s/p fall, neuro intact, will obtain CT head/c-spine, cxr, xray pelvis, all wnl no bleed  reassessed will have obs at NH and f/u Gray

## 2017-11-26 NOTE — ED PROVIDER NOTE - OBJECTIVE STATEMENT
84yo male PMH Alzheimer's disease, BPH, gastroesophageal reflux disease, hyperlipidemia, kidney stones presenting from nursing home after falling out of bed and hitting his head on the floor, no loss of consciousness. Patient denies any pain at this time. No nausea or vomiting. no weakness. Last tetanus unknown. No AC.

## 2017-11-26 NOTE — ED PROVIDER NOTE - PHYSICAL EXAMINATION
Gen: NAD, AOx3  Head: NCAT  HEENT: PERRL, EOMI, oral mucosa moist, normal conjunctiva  Lung: CTAB, no respiratory distress, no wheezing, rales, rhonchi  CV: normal s1/s2, rrr, no murmurs, Normal perfusion, pulses 2+ throughout  Abd: soft, NTND  MSK: No edema, no visible deformities, full range of motion in all 4 extremities  Neuro: CN II-XII grossly intact, No focal neurologic deficits  Skin: 2cm abrasion to frontal aspect of scalp not actively bleeding  Psych: Agitated

## 2017-11-26 NOTE — ED PROVIDER NOTE - CARE PLAN
Principal Discharge DX:	Head trauma  Instructions for follow-up, activity and diet:	1. Follow up with your primary care physician within 2-3days for reevaluation.  2.  Return to the Emergency Department for worsening, progressive or any other concerning symptoms.  Secondary Diagnosis:	Fall

## 2017-11-26 NOTE — ED PROVIDER NOTE - MEDICAL DECISION MAKING DETAILS
84yo male with head injury s/p fall, neuro intact, will obtain CT head/c-spine, cxr, xray pelvis, adacel, reassess. Kristel Bright DO

## 2017-12-19 PROCEDURE — 97116 GAIT TRAINING THERAPY: CPT

## 2017-12-19 PROCEDURE — 83036 HEMOGLOBIN GLYCOSYLATED A1C: CPT

## 2017-12-19 PROCEDURE — 93005 ELECTROCARDIOGRAM TRACING: CPT

## 2017-12-19 PROCEDURE — 80053 COMPREHEN METABOLIC PANEL: CPT

## 2017-12-19 PROCEDURE — 82550 ASSAY OF CK (CPK): CPT

## 2017-12-19 PROCEDURE — 82553 CREATINE MB FRACTION: CPT

## 2017-12-19 PROCEDURE — 83735 ASSAY OF MAGNESIUM: CPT

## 2017-12-19 PROCEDURE — 83605 ASSAY OF LACTIC ACID: CPT

## 2017-12-19 PROCEDURE — 84295 ASSAY OF SERUM SODIUM: CPT

## 2017-12-19 PROCEDURE — 85730 THROMBOPLASTIN TIME PARTIAL: CPT

## 2017-12-19 PROCEDURE — 83880 ASSAY OF NATRIURETIC PEPTIDE: CPT

## 2017-12-19 PROCEDURE — 82330 ASSAY OF CALCIUM: CPT

## 2017-12-19 PROCEDURE — 81003 URINALYSIS AUTO W/O SCOPE: CPT

## 2017-12-19 PROCEDURE — 82803 BLOOD GASES ANY COMBINATION: CPT

## 2017-12-19 PROCEDURE — 82607 VITAMIN B-12: CPT

## 2017-12-19 PROCEDURE — 86850 RBC ANTIBODY SCREEN: CPT

## 2017-12-19 PROCEDURE — 84100 ASSAY OF PHOSPHORUS: CPT

## 2017-12-19 PROCEDURE — 99285 EMERGENCY DEPT VISIT HI MDM: CPT | Mod: 25

## 2017-12-19 PROCEDURE — 84443 ASSAY THYROID STIM HORMONE: CPT

## 2017-12-19 PROCEDURE — 82435 ASSAY OF BLOOD CHLORIDE: CPT

## 2017-12-19 PROCEDURE — 82962 GLUCOSE BLOOD TEST: CPT

## 2017-12-19 PROCEDURE — 85610 PROTHROMBIN TIME: CPT

## 2017-12-19 PROCEDURE — 83690 ASSAY OF LIPASE: CPT

## 2017-12-19 PROCEDURE — 80061 LIPID PANEL: CPT

## 2017-12-19 PROCEDURE — 82306 VITAMIN D 25 HYDROXY: CPT

## 2017-12-19 PROCEDURE — 84132 ASSAY OF SERUM POTASSIUM: CPT

## 2017-12-19 PROCEDURE — 97530 THERAPEUTIC ACTIVITIES: CPT

## 2017-12-19 PROCEDURE — 86900 BLOOD TYPING SEROLOGIC ABO: CPT

## 2017-12-19 PROCEDURE — 97162 PT EVAL MOD COMPLEX 30 MIN: CPT

## 2017-12-19 PROCEDURE — 85014 HEMATOCRIT: CPT

## 2017-12-19 PROCEDURE — 82947 ASSAY GLUCOSE BLOOD QUANT: CPT

## 2017-12-19 PROCEDURE — 85027 COMPLETE CBC AUTOMATED: CPT

## 2017-12-19 PROCEDURE — 80048 BASIC METABOLIC PNL TOTAL CA: CPT

## 2017-12-19 PROCEDURE — 72125 CT NECK SPINE W/O DYE: CPT

## 2017-12-19 PROCEDURE — 71045 X-RAY EXAM CHEST 1 VIEW: CPT

## 2017-12-19 PROCEDURE — 86901 BLOOD TYPING SEROLOGIC RH(D): CPT

## 2017-12-19 PROCEDURE — 84484 ASSAY OF TROPONIN QUANT: CPT

## 2017-12-19 PROCEDURE — 70450 CT HEAD/BRAIN W/O DYE: CPT

## 2017-12-20 ENCOUNTER — INPATIENT (INPATIENT)
Facility: HOSPITAL | Age: 82
LOS: 18 days | Discharge: NURSING, REHAB OR LONG-TERM CARE | DRG: 312 | End: 2018-01-08
Attending: INTERNAL MEDICINE | Admitting: INTERNAL MEDICINE
Payer: MEDICARE

## 2017-12-20 VITALS
TEMPERATURE: 98 F | DIASTOLIC BLOOD PRESSURE: 86 MMHG | SYSTOLIC BLOOD PRESSURE: 134 MMHG | RESPIRATION RATE: 20 BRPM | HEART RATE: 116 BPM | OXYGEN SATURATION: 92 %

## 2017-12-20 DIAGNOSIS — R29.6 REPEATED FALLS: ICD-10-CM

## 2017-12-20 DIAGNOSIS — R55 SYNCOPE AND COLLAPSE: ICD-10-CM

## 2017-12-20 DIAGNOSIS — R60.9 EDEMA, UNSPECIFIED: ICD-10-CM

## 2017-12-20 DIAGNOSIS — G30.8 OTHER ALZHEIMER'S DISEASE: ICD-10-CM

## 2017-12-20 DIAGNOSIS — B97.4 RESPIRATORY SYNCYTIAL VIRUS AS THE CAUSE OF DISEASES CLASSIFIED ELSEWHERE: ICD-10-CM

## 2017-12-20 DIAGNOSIS — Z98.89 OTHER SPECIFIED POSTPROCEDURAL STATES: Chronic | ICD-10-CM

## 2017-12-20 DIAGNOSIS — N20.0 CALCULUS OF KIDNEY: Chronic | ICD-10-CM

## 2017-12-20 DIAGNOSIS — S22.31XA FRACTURE OF ONE RIB, RIGHT SIDE, INITIAL ENCOUNTER FOR CLOSED FRACTURE: ICD-10-CM

## 2017-12-20 LAB
ALBUMIN SERPL ELPH-MCNC: 4.1 G/DL — SIGNIFICANT CHANGE UP (ref 3.3–5)
ALP SERPL-CCNC: 84 U/L — SIGNIFICANT CHANGE UP (ref 40–120)
ALT FLD-CCNC: 20 U/L RC — SIGNIFICANT CHANGE UP (ref 10–45)
ANION GAP SERPL CALC-SCNC: 13 MMOL/L — SIGNIFICANT CHANGE UP (ref 5–17)
APPEARANCE UR: CLEAR — SIGNIFICANT CHANGE UP
AST SERPL-CCNC: 29 U/L — SIGNIFICANT CHANGE UP (ref 10–40)
BACTERIA # UR AUTO: ABNORMAL /HPF
BASE EXCESS BLDV CALC-SCNC: 6.8 MMOL/L — HIGH (ref -2–2)
BASOPHILS # BLD AUTO: 0 K/UL — SIGNIFICANT CHANGE UP (ref 0–0.2)
BASOPHILS NFR BLD AUTO: 0.2 % — SIGNIFICANT CHANGE UP (ref 0–2)
BILIRUB SERPL-MCNC: 0.6 MG/DL — SIGNIFICANT CHANGE UP (ref 0.2–1.2)
BILIRUB UR-MCNC: NEGATIVE — SIGNIFICANT CHANGE UP
BUN SERPL-MCNC: 14 MG/DL — SIGNIFICANT CHANGE UP (ref 7–23)
CA-I SERPL-SCNC: 1.26 MMOL/L — SIGNIFICANT CHANGE UP (ref 1.12–1.3)
CALCIUM SERPL-MCNC: 9.6 MG/DL — SIGNIFICANT CHANGE UP (ref 8.4–10.5)
CHLORIDE BLDV-SCNC: 103 MMOL/L — SIGNIFICANT CHANGE UP (ref 96–108)
CHLORIDE SERPL-SCNC: 102 MMOL/L — SIGNIFICANT CHANGE UP (ref 96–108)
CK MB BLD-MCNC: 1.8 % — SIGNIFICANT CHANGE UP (ref 0–3.5)
CK MB CFR SERPL CALC: 13.2 NG/ML — HIGH (ref 0–6.7)
CK MB CFR SERPL CALC: 13.7 NG/ML — HIGH (ref 0–6.7)
CK SERPL-CCNC: 1039 U/L — HIGH (ref 30–200)
CK SERPL-CCNC: 752 U/L — HIGH (ref 30–200)
CO2 BLDV-SCNC: 35 MMOL/L — HIGH (ref 22–30)
CO2 SERPL-SCNC: 29 MMOL/L — SIGNIFICANT CHANGE UP (ref 22–31)
COLOR SPEC: YELLOW — SIGNIFICANT CHANGE UP
CREAT SERPL-MCNC: 1.05 MG/DL — SIGNIFICANT CHANGE UP (ref 0.5–1.3)
DIFF PNL FLD: NEGATIVE — SIGNIFICANT CHANGE UP
EOSINOPHIL # BLD AUTO: 0 K/UL — SIGNIFICANT CHANGE UP (ref 0–0.5)
EOSINOPHIL NFR BLD AUTO: 0.3 % — SIGNIFICANT CHANGE UP (ref 0–6)
GAS PNL BLDV: 143 MMOL/L — SIGNIFICANT CHANGE UP (ref 136–145)
GAS PNL BLDV: SIGNIFICANT CHANGE UP
GAS PNL BLDV: SIGNIFICANT CHANGE UP
GLUCOSE BLDV-MCNC: 110 MG/DL — HIGH (ref 70–99)
GLUCOSE SERPL-MCNC: 105 MG/DL — HIGH (ref 70–99)
GLUCOSE UR QL: NEGATIVE — SIGNIFICANT CHANGE UP
HCO3 BLDV-SCNC: 33 MMOL/L — HIGH (ref 21–29)
HCT VFR BLD CALC: 42.2 % — SIGNIFICANT CHANGE UP (ref 39–50)
HCT VFR BLDA CALC: 44 % — SIGNIFICANT CHANGE UP (ref 39–50)
HGB BLD CALC-MCNC: 14.2 G/DL — SIGNIFICANT CHANGE UP (ref 13–17)
HGB BLD-MCNC: 14.2 G/DL — SIGNIFICANT CHANGE UP (ref 13–17)
HOROWITZ INDEX BLDV+IHG-RTO: SIGNIFICANT CHANGE UP
KETONES UR-MCNC: NEGATIVE — SIGNIFICANT CHANGE UP
LACTATE BLDV-MCNC: 1.7 MMOL/L — SIGNIFICANT CHANGE UP (ref 0.7–2)
LEUKOCYTE ESTERASE UR-ACNC: NEGATIVE — SIGNIFICANT CHANGE UP
LYMPHOCYTES # BLD AUTO: 1 K/UL — SIGNIFICANT CHANGE UP (ref 1–3.3)
LYMPHOCYTES # BLD AUTO: 10.2 % — LOW (ref 13–44)
MCHC RBC-ENTMCNC: 33.6 GM/DL — SIGNIFICANT CHANGE UP (ref 32–36)
MCHC RBC-ENTMCNC: 35 PG — HIGH (ref 27–34)
MCV RBC AUTO: 104 FL — HIGH (ref 80–100)
MONOCYTES # BLD AUTO: 1 K/UL — HIGH (ref 0–0.9)
MONOCYTES NFR BLD AUTO: 11 % — SIGNIFICANT CHANGE UP (ref 2–14)
NEUTROPHILS # BLD AUTO: 7.3 K/UL — SIGNIFICANT CHANGE UP (ref 1.8–7.4)
NEUTROPHILS NFR BLD AUTO: 78.3 % — HIGH (ref 43–77)
NITRITE UR-MCNC: NEGATIVE — SIGNIFICANT CHANGE UP
NT-PROBNP SERPL-SCNC: 114 PG/ML — SIGNIFICANT CHANGE UP (ref 0–300)
OTHER CELLS CSF MANUAL: 3 ML/DL — LOW (ref 18–22)
PCO2 BLDV: 55 MMHG — HIGH (ref 35–50)
PH BLDV: 7.4 — SIGNIFICANT CHANGE UP (ref 7.35–7.45)
PH UR: 7.5 — SIGNIFICANT CHANGE UP (ref 5–8)
PLATELET # BLD AUTO: 233 K/UL — SIGNIFICANT CHANGE UP (ref 150–400)
PO2 BLDV: 16 MMHG — LOW (ref 25–45)
POTASSIUM BLDV-SCNC: 4.1 MMOL/L — SIGNIFICANT CHANGE UP (ref 3.5–5)
POTASSIUM SERPL-MCNC: 4.6 MMOL/L — SIGNIFICANT CHANGE UP (ref 3.5–5.3)
POTASSIUM SERPL-SCNC: 4.6 MMOL/L — SIGNIFICANT CHANGE UP (ref 3.5–5.3)
PROT SERPL-MCNC: 7.3 G/DL — SIGNIFICANT CHANGE UP (ref 6–8.3)
PROT UR-MCNC: 30 MG/DL
RAPID RVP RESULT: DETECTED
RBC # BLD: 4.05 M/UL — LOW (ref 4.2–5.8)
RBC # FLD: 12.4 % — SIGNIFICANT CHANGE UP (ref 10.3–14.5)
RBC CASTS # UR COMP ASSIST: SIGNIFICANT CHANGE UP /HPF (ref 0–2)
RSV RNA SPEC QL NAA+PROBE: DETECTED
SAO2 % BLDV: 15 % — LOW (ref 67–88)
SODIUM SERPL-SCNC: 144 MMOL/L — SIGNIFICANT CHANGE UP (ref 135–145)
SP GR SPEC: 1.02 — SIGNIFICANT CHANGE UP (ref 1.01–1.02)
TROPONIN T SERPL-MCNC: 0.01 NG/ML — SIGNIFICANT CHANGE UP (ref 0–0.06)
TROPONIN T SERPL-MCNC: 0.02 NG/ML — SIGNIFICANT CHANGE UP (ref 0–0.06)
UROBILINOGEN FLD QL: 4
WBC # BLD: 9.3 K/UL — SIGNIFICANT CHANGE UP (ref 3.8–10.5)
WBC # FLD AUTO: 9.3 K/UL — SIGNIFICANT CHANGE UP (ref 3.8–10.5)
WBC UR QL: SIGNIFICANT CHANGE UP /HPF (ref 0–5)

## 2017-12-20 PROCEDURE — 71250 CT THORAX DX C-: CPT | Mod: 26

## 2017-12-20 PROCEDURE — 99285 EMERGENCY DEPT VISIT HI MDM: CPT | Mod: GC

## 2017-12-20 PROCEDURE — 93010 ELECTROCARDIOGRAM REPORT: CPT | Mod: 76

## 2017-12-20 PROCEDURE — 99223 1ST HOSP IP/OBS HIGH 75: CPT

## 2017-12-20 PROCEDURE — 70450 CT HEAD/BRAIN W/O DYE: CPT | Mod: 26

## 2017-12-20 PROCEDURE — 72125 CT NECK SPINE W/O DYE: CPT | Mod: 26

## 2017-12-20 RX ORDER — CHOLECALCIFEROL (VITAMIN D3) 125 MCG
1000 CAPSULE ORAL
Qty: 0 | Refills: 0 | Status: DISCONTINUED | OUTPATIENT
Start: 2017-12-20 | End: 2018-01-08

## 2017-12-20 RX ORDER — SODIUM CHLORIDE 9 MG/ML
500 INJECTION INTRAMUSCULAR; INTRAVENOUS; SUBCUTANEOUS ONCE
Qty: 0 | Refills: 0 | Status: COMPLETED | OUTPATIENT
Start: 2017-12-20 | End: 2017-12-20

## 2017-12-20 RX ORDER — MEMANTINE HYDROCHLORIDE 10 MG/1
10 TABLET ORAL AT BEDTIME
Qty: 0 | Refills: 0 | Status: DISCONTINUED | OUTPATIENT
Start: 2017-12-20 | End: 2018-01-08

## 2017-12-20 RX ORDER — POLYETHYLENE GLYCOL 3350 17 G/17G
17 POWDER, FOR SOLUTION ORAL DAILY
Qty: 0 | Refills: 0 | Status: DISCONTINUED | OUTPATIENT
Start: 2017-12-20 | End: 2018-01-08

## 2017-12-20 RX ORDER — DOCUSATE SODIUM 100 MG
100 CAPSULE ORAL THREE TIMES A DAY
Qty: 0 | Refills: 0 | Status: DISCONTINUED | OUTPATIENT
Start: 2017-12-20 | End: 2018-01-08

## 2017-12-20 RX ORDER — ONDANSETRON 8 MG/1
4 TABLET, FILM COATED ORAL EVERY 6 HOURS
Qty: 0 | Refills: 0 | Status: DISCONTINUED | OUTPATIENT
Start: 2017-12-20 | End: 2018-01-08

## 2017-12-20 RX ORDER — ASPIRIN/CALCIUM CARB/MAGNESIUM 324 MG
81 TABLET ORAL DAILY
Qty: 0 | Refills: 0 | Status: DISCONTINUED | OUTPATIENT
Start: 2017-12-20 | End: 2018-01-08

## 2017-12-20 RX ORDER — NYSTATIN CREAM 100000 [USP'U]/G
1 CREAM TOPICAL
Qty: 0 | Refills: 0 | Status: DISCONTINUED | OUTPATIENT
Start: 2017-12-20 | End: 2018-01-08

## 2017-12-20 RX ORDER — MIDODRINE HYDROCHLORIDE 2.5 MG/1
5 TABLET ORAL
Qty: 0 | Refills: 0 | Status: DISCONTINUED | OUTPATIENT
Start: 2017-12-20 | End: 2017-12-30

## 2017-12-20 RX ORDER — HEPARIN SODIUM 5000 [USP'U]/ML
5000 INJECTION INTRAVENOUS; SUBCUTANEOUS EVERY 8 HOURS
Qty: 0 | Refills: 0 | Status: DISCONTINUED | OUTPATIENT
Start: 2017-12-20 | End: 2018-01-08

## 2017-12-20 RX ORDER — SODIUM CHLORIDE 0.65 %
1 AEROSOL, SPRAY (ML) NASAL DAILY
Qty: 0 | Refills: 0 | Status: DISCONTINUED | OUTPATIENT
Start: 2017-12-20 | End: 2018-01-08

## 2017-12-20 RX ORDER — FAMOTIDINE 10 MG/ML
20 INJECTION INTRAVENOUS
Qty: 0 | Refills: 0 | Status: DISCONTINUED | OUTPATIENT
Start: 2017-12-20 | End: 2018-01-08

## 2017-12-20 RX ORDER — INFLUENZA VIRUS VACCINE 15; 15; 15; 15 UG/.5ML; UG/.5ML; UG/.5ML; UG/.5ML
0.5 SUSPENSION INTRAMUSCULAR ONCE
Qty: 0 | Refills: 0 | Status: DISCONTINUED | OUTPATIENT
Start: 2017-12-20 | End: 2018-01-08

## 2017-12-20 RX ORDER — IPRATROPIUM/ALBUTEROL SULFATE 18-103MCG
3 AEROSOL WITH ADAPTER (GRAM) INHALATION EVERY 6 HOURS
Qty: 0 | Refills: 0 | Status: DISCONTINUED | OUTPATIENT
Start: 2017-12-20 | End: 2018-01-08

## 2017-12-20 RX ORDER — ASPIRIN/CALCIUM CARB/MAGNESIUM 324 MG
325 TABLET ORAL ONCE
Qty: 0 | Refills: 0 | Status: COMPLETED | OUTPATIENT
Start: 2017-12-20 | End: 2017-12-20

## 2017-12-20 RX ORDER — QUETIAPINE FUMARATE 200 MG/1
25 TABLET, FILM COATED ORAL AT BEDTIME
Qty: 0 | Refills: 0 | Status: DISCONTINUED | OUTPATIENT
Start: 2017-12-20 | End: 2017-12-29

## 2017-12-20 RX ORDER — ACETAMINOPHEN 500 MG
650 TABLET ORAL EVERY 6 HOURS
Qty: 0 | Refills: 0 | Status: DISCONTINUED | OUTPATIENT
Start: 2017-12-20 | End: 2018-01-08

## 2017-12-20 RX ADMIN — HEPARIN SODIUM 5000 UNIT(S): 5000 INJECTION INTRAVENOUS; SUBCUTANEOUS at 14:29

## 2017-12-20 RX ADMIN — SODIUM CHLORIDE 1000 MILLILITER(S): 9 INJECTION INTRAMUSCULAR; INTRAVENOUS; SUBCUTANEOUS at 04:00

## 2017-12-20 RX ADMIN — Medication 1000 UNIT(S): at 18:23

## 2017-12-20 RX ADMIN — Medication 1 SPRAY(S): at 14:30

## 2017-12-20 RX ADMIN — POLYETHYLENE GLYCOL 3350 17 GRAM(S): 17 POWDER, FOR SOLUTION ORAL at 14:29

## 2017-12-20 RX ADMIN — QUETIAPINE FUMARATE 25 MILLIGRAM(S): 200 TABLET, FILM COATED ORAL at 18:23

## 2017-12-20 RX ADMIN — Medication 1 TABLET(S): at 14:29

## 2017-12-20 RX ADMIN — FAMOTIDINE 20 MILLIGRAM(S): 10 INJECTION INTRAVENOUS at 18:23

## 2017-12-20 RX ADMIN — Medication 100 MILLIGRAM(S): at 14:29

## 2017-12-20 RX ADMIN — NYSTATIN CREAM 1 APPLICATION(S): 100000 CREAM TOPICAL at 18:23

## 2017-12-20 RX ADMIN — Medication 325 MILLIGRAM(S): at 06:09

## 2017-12-20 RX ADMIN — MIDODRINE HYDROCHLORIDE 5 MILLIGRAM(S): 2.5 TABLET ORAL at 18:27

## 2017-12-20 NOTE — ED ADULT TRIAGE NOTE - NS ED NURSE AMBULANCES
Date of Service: 2017    HISTORY OF PRESENT ILLNESS:  The patient is a 55-year-old male who presents for a new patient evaluation with me.   had been seeing a physician up in [facility].  The patient had seen Dr. Santos here apparently regarding DJD of the knee.  The patient apparently had right knee arthroscopy in the past but noted the left knee had been bothering him more. The patient notes that he has an aching discomfort in the right mid to upper abdomen that has been bothersome for him, but he has not had any significant workup here for that.    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  He has not had any injections, tried Naprosyn but really was not impressed that it was that helpful.    The patient is , lives with his wife, is on Lipitor 20 mg daily. Last laboratory testing a year ago, had Compazine apparently cause muscle spasm in the past.  His Tetanus status is up-to-date.  He is due for colonoscopic evaluation, will get that set up in addition.    The patient's grandfather on his father's side had heart disease and  at 44.  Father  at 59 apparently of heart disease.  We discussed different methods to evaluate that including stress testing, his last was 15 years ago doing a rapid heart scan, and he will consider that.    The patient does not smoke or use any significant alcohol.  He claims he does tend to kick around a fair amount at night and has some daytime fatigue. He is having difficulty getting and maintaining an erection.    CURRENT MEDICATIONS:  The patient's medications were reviewed and updated in the chart.     ALLERGIES:  Reviewed and updated in the chart.     FAMILY AND SOCIAL HISTORY:  Reviewed in Epic and updated.  Smoking history was updated.     REVIEW OF SYSTEMS:  The patient notes no new cardiovascular, gastrointestinal or urinary, neurologic symptoms.  Patient has had no history of significant night sweats, fever, weight loss.  The patient has had no  marked difficulty with swallowing, cough, wheezing or shortness of breath.  Patient has had no significant nausea, vomiting, diarrhea or painful urination.  The patient notes no joint swelling, stiffness or pain, except as noted above.  The patient has not had any new significant feelings of depression or anxiety, except as noted above.  All other systems were reviewed and were negative except as noted above.       PHYSICAL EXAMINATION:  VITAL SIGNS:  Reviewed and included in the chart.  GENERAL APPEARANCE:  The patient is alert and oriented x3.  Affect is appropriate.  SKIN:  No significant rash or lesions.  HEENT:  Pupils were equal, round and react to light.  Extraocular movements are intact.  Tympanic membranes are gray with normal landmarks.  Hearing is grossly normal.  Oral cavity and pharynx have no significant abnormality of the gums or posterior pharynx.  No significant tonsillar enlargement.  NECK:  No thyromegaly or masses.  No lymphadenopathy.  No bruits.  Supple.  CHEST:  Clear to auscultation.  No rales or wheezing appreciated.  Respiratory effort was normal.  BACK:  Without any deformity or tenderness.  CARDIAC:  Regular rhythm with a normal S1 and S2.  No S3, S4 or significant murmur was appreciated.    ABDOMEN:  No hepatosplenomegaly.  No mass or tenderness.  No abnormal pulsation or bruit.  No hernia was appreciated.  EXTREMITIES:  No clubbing, cyanosis or edema.      ASSESSMENT AND PLAN:   Patient with aching discomfort in the right upper abdomen. Those findings are not marked. I think an ultrasound would be appropriate.  Will check his laboratory studies, and consider further workup depending on his results.    The patient had difficulty getting and maintaining erections over the last 3 months. We will go ahead and do his hormonal studies.  Consideration of Viagra or similar agents may certainly be considered here.  The patient will get overnight sleep oximetry for possible sleep apnea, other  health maintenance, etc. was discussed.  PSA was offered.  If he has worsening symptoms, he will call or return.  Will follow up in the next 3 months.      Dictated By: Travis Neil MD  Signing Provider: MD MARTIN Early/EDELMIRA ([id number])  DD: 03/27/2017 14:52:24 TD: 03/28/2017 09:51:27    Copy Sent To:      [facility]

## 2017-12-20 NOTE — H&P ADULT - ASSESSMENT
Patient is an 84 yo M w/ Alzheimer's Dementia (documented in previous admission as AAOx2 to 3), HLD, BPH,GI Bleed, nephrolithiasis, sinus bradycardia s/p loop recorder, multiple hospitalizations for falls now p/w unwitnessed fall in rehab facility.  Patient is found to have pos RSV infection with increased serum CPK, and acute right 9th rib fracture.

## 2017-12-20 NOTE — ED PROVIDER NOTE - ATTENDING CONTRIBUTION TO CARE
83M hx Alzheimer's dementia (baseline A+Ox2) presents presents from nursing home s/p unwitnessed fall.  c/o of "elbow pain".  Unknown last normal.  rhinorrhea.  2+ pitting edema to thighs. 83M hx Alzheimer's dementia (baseline A+Ox2) presents from nursing home s/p unwitnessed fall.  Unknown last normal, unclear how long patient down for.  +rhinorrhea, occasional cough.  As per wife patient has been more fatigued and more lethargic.  On ASA 81mg, no other A/C.  No known fever/chills, nausea/vomiting, chest pain, sob, abdominal pain.    Arousable.  Oriented to person. NAD, NCAT, PERRL, no midline spinal ttp throughout.  Rhinorrhea with nasal congestion, mild R anterolateral chest wall ttp.  CTA b/l, s1s2, abdomen soft ndnt, 2+ pitting edema to knees, 2+ radial and DP pulse.  Concern for syncope, as well as infectious etiology.  given changed in mental status concern for ich with asa vs metabolic encephalopathy.  will obtain expedited cth  Will obtain ekg, ct head, c-spine, chest, troponin, pbnp, rvp, urine, reassess

## 2017-12-20 NOTE — H&P ADULT - PROBLEM SELECTOR PLAN 2
Unsure of Etiology   Trop neg / will get another set of cardiac enzymes   PT evaluation   Will get TTE Unsure of Etiology   Trop neg / will get another set of cardiac enzymes   PT evaluation   Will get TTE/ pt had previous TTE done in 6/2017 with normal EF   Pt had Loop recorder evaluation on 10/2017 with report of 3-4 seconds Pause/ Might need another evaluation if warranted   As per Telemetry patient is in Sinus rhythm with rate =  / current rate= 87

## 2017-12-20 NOTE — H&P ADULT - PROBLEM SELECTOR PLAN 3
Will get DOppler of the Lower extremities   recent TTE done in 6/2017 with normal EF / BNP = 114  If Doppler is negative for DVT might consider support stocking or low dose diuretics

## 2017-12-20 NOTE — ED PROVIDER NOTE - MEDICAL DECISION MAKING DETAILS
Patient is an 82 yo M w/ Alzheimer's Dementia (documented in previous admission as AAOx2 to 3), HLD, BPH, nephrolithiasis, sinus bradycardia s/p loop recorder, multiple hospitalizations for falls now p/w unwitnessed fall. DDx includes infectious encephalopathy vs syncope vs ADHF. ?new AMS and ?new peripheral edema. Will check cbc, cmp, RVP, UA, BNP, Jesus, EKG

## 2017-12-20 NOTE — H&P ADULT - PROBLEM SELECTOR PLAN 6
Pain control Pain control  General surgery is paged, awaiting on call back   NO retraction , no sob.

## 2017-12-20 NOTE — H&P ADULT - COMMENTS
ROS is limited since patient answers some questions and others he is not sure and patient lives in a rehab facility.

## 2017-12-20 NOTE — H&P ADULT - HISTORY OF PRESENT ILLNESS
Patient is an 82 yo M w/ Alzheimer's Dementia (documented in previous admission as AAOx2 to 3), HLD, BPH,GI Bleed, nephrolithiasis, sinus bradycardia s/p loop recorder, multiple hospitalizations for falls now p/w unwitnessed fall. Patient unable to recount events. Wife at bedside reports that she was called at 145AM and told that he fell. As per triage note, fall was unwitnessed  and patient was found sitting in his wheelchair but reported falling. Patient currently denies any CP, SOB, pain anywhere, fevers, chills. Patient endorses rhinnorhea and sneezing.    ED :  Tm=98.4F HR =   BP= 122-134/74-86   SPO2 = 92-95% RA .  Patient received a bolus of NS 500ml and one dose of aspirin 325 mg oral .  RVP : pos RSV     Previous admission   : 11/10-11/15   due to head trauma in setting of hypotension and sinus bradycardia and patient was d/c to Rehab facility. Patient is an 82 yo M w/ Alzheimer's Dementia (documented in previous admission as AAOx2 to 3), HLD, BPH,GI Bleed, nephrolithiasis, sinus bradycardia s/p loop recorder, multiple hospitalizations for falls now p/w unwitnessed fall. Patient unable to recount events. Wife at bedside reports that she was called at 145AM and told that he fell. As per triage note, fall was unwitnessed  and patient was found sitting in his wheelchair but reported falling. wife states that she was not aware of any CP, SOB, pain anywhere, fevers, chills pr cough.  Wife also states that patient appears swollen all over and patient was c/o pain on the knees.   As per wife, the rehab reported that patient fell from Wheelchair.     ED :  Tm=98.4F HR =   BP= 122-134/74-86   SPO2 = 92-95% RA .  Patient received a bolus of NS 500ml and one dose of aspirin 325 mg oral .  RVP : pos RSV     Previous admission   : 11/10-11/15   due to head trauma in setting of hypotension and sinus bradycardia and patient was d/c to Rehab facility.

## 2017-12-20 NOTE — ED ADULT NURSE NOTE - OBJECTIVE STATEMENT
patient brought in by ems for s/p fall  fall unwitnessed by nh staff, patient found sitting in his wheelchair but reports falling, unknown LOC, no obvious trauma, no bleeding, no contusion noted  patient sent to the hospital for change in mental status  patient know name and that he is in the hospital, does not know the date

## 2017-12-20 NOTE — H&P ADULT - NSHPLABSRESULTS_GEN_ALL_CORE
Lab results are reviewed by me with noted WBC= 9.3  MCV = 104.0  PLT = 233  Serum total CK= 752   Trop Negative X one  , BNP= 114   .  POS RSV / CT Of chest with Pos Acute 9th rib fracture.

## 2017-12-20 NOTE — H&P ADULT - PROBLEM SELECTOR PLAN 5
Cont Namenda  Patient is on Serroquel 2 mg oral at night / ECG is ordered / unable to locate ECG done from ED / HOLD serroquel if QTC > 500 and call psych for guidance  fall precautions   B12 and TSh was within range in 11/2017

## 2017-12-20 NOTE — ED ADULT NURSE REASSESSMENT NOTE - NS ED NURSE REASSESS COMMENT FT1
patient resting in bed. no indication of pain or discomfort at this time. VS reassessed, VS are stable. plan of care reviewed with patient and wife, patient is being admitted to the floor. contact isolation maintained for pos RVP.

## 2017-12-20 NOTE — H&P ADULT - RS GEN PE MLT RESP DETAILS PC
no chest wall tenderness/clear to auscultation bilaterally/breath sounds equal/no intercostal retractions

## 2017-12-20 NOTE — ED PROVIDER NOTE - ENMT, MLM
Airway patent, Rhinnorhea. Mouth with normal mucosa. Throat has no vesicles, no oropharyngeal exudates and uvula is midline.

## 2017-12-20 NOTE — H&P ADULT - PROBLEM SELECTOR PLAN 1
Increase oral intake   Mild elevation of serum CK / will repeat serum CK if trending upward, might need to administer careful hydration   Duoneb as needed   NO current cough , if patient starts coughing , please start an antitussive agent

## 2017-12-20 NOTE — ED PROVIDER NOTE - CARE PLAN
Principal Discharge DX:	Syncope  Secondary Diagnosis:	Edema Principal Discharge DX:	Syncope  Secondary Diagnosis:	Edema  Secondary Diagnosis:	RSV infection

## 2017-12-21 LAB
ALBUMIN SERPL ELPH-MCNC: 3.2 G/DL — LOW (ref 3.3–5)
ALP SERPL-CCNC: 76 U/L — SIGNIFICANT CHANGE UP (ref 40–120)
ALT FLD-CCNC: 17 U/L — SIGNIFICANT CHANGE UP (ref 10–45)
ANION GAP SERPL CALC-SCNC: 14 MMOL/L — SIGNIFICANT CHANGE UP (ref 5–17)
AST SERPL-CCNC: 43 U/L — HIGH (ref 10–40)
BASOPHILS # BLD AUTO: 0.03 K/UL — SIGNIFICANT CHANGE UP (ref 0–0.2)
BASOPHILS NFR BLD AUTO: 0.4 % — SIGNIFICANT CHANGE UP (ref 0–2)
BILIRUB SERPL-MCNC: 0.8 MG/DL — SIGNIFICANT CHANGE UP (ref 0.2–1.2)
BUN SERPL-MCNC: 10 MG/DL — SIGNIFICANT CHANGE UP (ref 7–23)
CALCIUM SERPL-MCNC: 8.9 MG/DL — SIGNIFICANT CHANGE UP (ref 8.4–10.5)
CHLORIDE SERPL-SCNC: 102 MMOL/L — SIGNIFICANT CHANGE UP (ref 96–108)
CO2 SERPL-SCNC: 25 MMOL/L — SIGNIFICANT CHANGE UP (ref 22–31)
CREAT SERPL-MCNC: 0.86 MG/DL — SIGNIFICANT CHANGE UP (ref 0.5–1.3)
EOSINOPHIL # BLD AUTO: 0.16 K/UL — SIGNIFICANT CHANGE UP (ref 0–0.5)
EOSINOPHIL NFR BLD AUTO: 2.3 % — SIGNIFICANT CHANGE UP (ref 0–6)
GLUCOSE SERPL-MCNC: 90 MG/DL — SIGNIFICANT CHANGE UP (ref 70–99)
HCT VFR BLD CALC: 38.2 % — LOW (ref 39–50)
HGB BLD-MCNC: 12.7 G/DL — LOW (ref 13–17)
IMM GRANULOCYTES NFR BLD AUTO: 0.4 % — SIGNIFICANT CHANGE UP (ref 0–1.5)
LYMPHOCYTES # BLD AUTO: 1.63 K/UL — SIGNIFICANT CHANGE UP (ref 1–3.3)
LYMPHOCYTES # BLD AUTO: 23.8 % — SIGNIFICANT CHANGE UP (ref 13–44)
MAGNESIUM SERPL-MCNC: 1.9 MG/DL — SIGNIFICANT CHANGE UP (ref 1.6–2.6)
MCHC RBC-ENTMCNC: 33 PG — SIGNIFICANT CHANGE UP (ref 27–34)
MCHC RBC-ENTMCNC: 33.2 GM/DL — SIGNIFICANT CHANGE UP (ref 32–36)
MCV RBC AUTO: 99.2 FL — SIGNIFICANT CHANGE UP (ref 80–100)
MONOCYTES # BLD AUTO: 1.15 K/UL — HIGH (ref 0–0.9)
MONOCYTES NFR BLD AUTO: 16.8 % — HIGH (ref 2–14)
NEUTROPHILS # BLD AUTO: 3.86 K/UL — SIGNIFICANT CHANGE UP (ref 1.8–7.4)
NEUTROPHILS NFR BLD AUTO: 56.3 % — SIGNIFICANT CHANGE UP (ref 43–77)
PLATELET # BLD AUTO: 203 K/UL — SIGNIFICANT CHANGE UP (ref 150–400)
POTASSIUM SERPL-MCNC: 4.3 MMOL/L — SIGNIFICANT CHANGE UP (ref 3.5–5.3)
POTASSIUM SERPL-SCNC: 4.3 MMOL/L — SIGNIFICANT CHANGE UP (ref 3.5–5.3)
PROT SERPL-MCNC: 6.4 G/DL — SIGNIFICANT CHANGE UP (ref 6–8.3)
RBC # BLD: 3.85 M/UL — LOW (ref 4.2–5.8)
RBC # FLD: 14.4 % — SIGNIFICANT CHANGE UP (ref 10.3–14.5)
SODIUM SERPL-SCNC: 141 MMOL/L — SIGNIFICANT CHANGE UP (ref 135–145)
T3 SERPL-MCNC: 87 NG/DL — SIGNIFICANT CHANGE UP (ref 80–200)
T4 AB SER-ACNC: 5.6 UG/DL — SIGNIFICANT CHANGE UP (ref 4.6–12)
TSH SERPL-MCNC: 1.89 UIU/ML — SIGNIFICANT CHANGE UP (ref 0.27–4.2)
WBC # BLD: 6.86 K/UL — SIGNIFICANT CHANGE UP (ref 3.8–10.5)
WBC # FLD AUTO: 6.86 K/UL — SIGNIFICANT CHANGE UP (ref 3.8–10.5)

## 2017-12-21 PROCEDURE — 93306 TTE W/DOPPLER COMPLETE: CPT | Mod: 26

## 2017-12-21 RX ORDER — TAMSULOSIN HYDROCHLORIDE 0.4 MG/1
0.4 CAPSULE ORAL AT BEDTIME
Qty: 0 | Refills: 0 | Status: DISCONTINUED | OUTPATIENT
Start: 2017-12-21 | End: 2018-01-08

## 2017-12-21 RX ADMIN — TAMSULOSIN HYDROCHLORIDE 0.4 MILLIGRAM(S): 0.4 CAPSULE ORAL at 22:08

## 2017-12-21 RX ADMIN — Medication 81 MILLIGRAM(S): at 12:24

## 2017-12-21 RX ADMIN — MEMANTINE HYDROCHLORIDE 10 MILLIGRAM(S): 10 TABLET ORAL at 22:08

## 2017-12-21 RX ADMIN — FAMOTIDINE 20 MILLIGRAM(S): 10 INJECTION INTRAVENOUS at 06:07

## 2017-12-21 RX ADMIN — NYSTATIN CREAM 1 APPLICATION(S): 100000 CREAM TOPICAL at 19:51

## 2017-12-21 RX ADMIN — Medication 100 MILLIGRAM(S): at 06:07

## 2017-12-21 RX ADMIN — Medication 100 MILLIGRAM(S): at 22:08

## 2017-12-21 RX ADMIN — FAMOTIDINE 20 MILLIGRAM(S): 10 INJECTION INTRAVENOUS at 19:50

## 2017-12-21 RX ADMIN — HEPARIN SODIUM 5000 UNIT(S): 5000 INJECTION INTRAVENOUS; SUBCUTANEOUS at 22:08

## 2017-12-21 RX ADMIN — POLYETHYLENE GLYCOL 3350 17 GRAM(S): 17 POWDER, FOR SOLUTION ORAL at 12:24

## 2017-12-21 RX ADMIN — QUETIAPINE FUMARATE 25 MILLIGRAM(S): 200 TABLET, FILM COATED ORAL at 22:08

## 2017-12-21 RX ADMIN — Medication 1 SPRAY(S): at 12:24

## 2017-12-21 RX ADMIN — MIDODRINE HYDROCHLORIDE 5 MILLIGRAM(S): 2.5 TABLET ORAL at 06:07

## 2017-12-21 RX ADMIN — NYSTATIN CREAM 1 APPLICATION(S): 100000 CREAM TOPICAL at 06:08

## 2017-12-21 RX ADMIN — Medication 1 TABLET(S): at 12:25

## 2017-12-21 RX ADMIN — Medication 1000 UNIT(S): at 19:51

## 2017-12-21 RX ADMIN — MIDODRINE HYDROCHLORIDE 5 MILLIGRAM(S): 2.5 TABLET ORAL at 19:50

## 2017-12-21 RX ADMIN — HEPARIN SODIUM 5000 UNIT(S): 5000 INJECTION INTRAVENOUS; SUBCUTANEOUS at 06:10

## 2017-12-21 RX ADMIN — Medication 1000 UNIT(S): at 06:07

## 2017-12-21 NOTE — PROGRESS NOTE ADULT - SUBJECTIVE AND OBJECTIVE BOX
Patient is a 83y old  Male who presents with a chief complaint of Loss of Consciousness with resultant fall (20 Dec 2017 11:35)      SUBJECTIVE / OVERNIGHT EVENTS:    MEDICATIONS  (STANDING):  aspirin enteric coated 81 milliGRAM(s) Oral daily  cholecalciferol 1000 Unit(s) Oral two times a day  docusate sodium 100 milliGRAM(s) Oral three times a day  famotidine    Tablet 20 milliGRAM(s) Oral two times a day  heparin  Injectable 5000 Unit(s) SubCutaneous every 8 hours  influenza   Vaccine 0.5 milliLiter(s) IntraMuscular once  memantine 10 milliGRAM(s) Oral at bedtime  midodrine 5 milliGRAM(s) Oral two times a day  multivitamin 1 Tablet(s) Oral daily  nystatin Powder 1 Application(s) Topical two times a day  polyethylene glycol 3350 17 Gram(s) Oral daily  QUEtiapine 25 milliGRAM(s) Oral at bedtime  sodium chloride 0.65% Nasal 1 Spray(s) Both Nostrils daily    MEDICATIONS  (PRN):  acetaminophen   Tablet 650 milliGRAM(s) Oral every 6 hours PRN For Temp greater than 38 C (100.4 F)  ALBUTerol/ipratropium for Nebulization 3 milliLiter(s) Nebulizer every 6 hours PRN Shortness of Breath and/or Wheezing  ondansetron Injectable 4 milliGRAM(s) IV Push every 6 hours PRN Nausea        CAPILLARY BLOOD GLUCOSE        I&O's Summary    20 Dec 2017 07:  -  21 Dec 2017 07:00  --------------------------------------------------------  IN: 920 mL / OUT: 1225 mL / NET: -305 mL    21 Dec 2017 07:  -  21 Dec 2017 15:13  --------------------------------------------------------  IN: 720 mL / OUT: 520 mL / NET: 200 mL        PHYSICAL EXAM:  GENERAL: NAD, well-developed  HEAD:  Atraumatic, Normocephalic  EYES: EOMI, PERRLA, conjunctiva and sclera clear  NECK: Supple, No JVD  CHEST/LUNG: Clear to auscultation bilaterally; No wheeze  HEART: Regular rate and rhythm; No murmurs, rubs, or gallops  ABDOMEN: Soft, Nontender, Nondistended; Bowel sounds present  EXTREMITIES:  2+ Peripheral Pulses, No clubbing, cyanosis, or edema  PSYCH: AAOx3  NEUROLOGY: non-focal  SKIN: No rashes or lesions    LABS:                        12.7   6.86  )-----------( 203      ( 21 Dec 2017 07:34 )             38.2         141  |  102  |  10  ----------------------------<  90  4.3   |  25  |  0.86    Ca    8.9      21 Dec 2017 07:33  Mg     1.9         TPro  6.4  /  Alb  3.2<L>  /  TBili  0.8  /  DBili  x   /  AST  43<H>  /  ALT  17  /  AlkPhos  76        CARDIAC MARKERS ( 20 Dec 2017 15:29 )  x     / x     / 1039 U/L / x     / x      CARDIAC MARKERS ( 20 Dec 2017 13:42 )  x     / 0.02 ng/mL / x     / x     / 13.2 ng/mL  CARDIAC MARKERS ( 20 Dec 2017 04:25 )  x     / 0.01 ng/mL / 752 U/L / x     / 13.7 ng/mL      Urinalysis Basic - ( 20 Dec 2017 05:58 )    Color: Yellow / Appearance: Clear / S.021 / pH: x  Gluc: x / Ketone: Negative  / Bili: Negative / Urobili: 4   Blood: x / Protein: 30 mg/dL / Nitrite: Negative   Leuk Esterase: Negative / RBC: 0-2 /HPF / WBC 0-2 /HPF   Sq Epi: x / Non Sq Epi: x / Bacteria: Few /HPF        RADIOLOGY & ADDITIONAL TESTS:    Imaging Personally Reviewed:    Consultant(s) Notes Reviewed:      Care Discussed with Consultants/Other Providers:    Josie Borrego PA-C  [facility]   [id number] Patient is a 83y old  Male who presents was admitted with of Loss of Consciousness with resultant fall (20 Dec 2017 11:35)., Asked to see pt who was sitting on the floor. No fall      SUBJECTIVE / OVERNIGHT EVENTS:    MEDICATIONS  (STANDING):  aspirin enteric coated 81 milliGRAM(s) Oral daily  cholecalciferol 1000 Unit(s) Oral two times a day  docusate sodium 100 milliGRAM(s) Oral three times a day  famotidine    Tablet 20 milliGRAM(s) Oral two times a day  heparin  Injectable 5000 Unit(s) SubCutaneous every 8 hours  influenza   Vaccine 0.5 milliLiter(s) IntraMuscular once  memantine 10 milliGRAM(s) Oral at bedtime  midodrine 5 milliGRAM(s) Oral two times a day  multivitamin 1 Tablet(s) Oral daily  nystatin Powder 1 Application(s) Topical two times a day  polyethylene glycol 3350 17 Gram(s) Oral daily  QUEtiapine 25 milliGRAM(s) Oral at bedtime  sodium chloride 0.65% Nasal 1 Spray(s) Both Nostrils daily    MEDICATIONS  (PRN):  acetaminophen   Tablet 650 milliGRAM(s) Oral every 6 hours PRN For Temp greater than 38 C (100.4 F)  ALBUTerol/ipratropium for Nebulization 3 milliLiter(s) Nebulizer every 6 hours PRN Shortness of Breath and/or Wheezing  ondansetron Injectable 4 milliGRAM(s) IV Push every 6 hours PRN Nausea        CAPILLARY BLOOD GLUCOSE        I&O's Summary    20 Dec 2017 07:  -  21 Dec 2017 07:00  --------------------------------------------------------  IN: 920 mL / OUT: 1225 mL / NET: -305 mL    21 Dec 2017 07:  -  21 Dec 2017 15:13  --------------------------------------------------------  IN: 720 mL / OUT: 520 mL / NET: 200 mL        PHYSICAL EXAM:  GENERAL: NAD, well-developed  HEAD:  Atraumatic, Normocephalic, no trauma  EYES: sclera clear  NECK: Supple, No JVD  CHEST/LUNG: Clear to auscultation bilaterally; No wheeze  HEART: Regular rate and rhythm; No murmurs, rubs, or gallops  ABDOMEN: Soft, Nontender, Nondistended; Bowel sounds present  EXTREMITIES:  2+ Peripheral Pulses, No clubbing, cyanosis, or edema  PSYCH: AAOx3  NEUROLOGY: non-focal  SKIN: No rashes, laceration, contussion or lesions    LABS:                        12.7   6.86  )-----------( 203      ( 21 Dec 2017 07:34 )             38.2     12-    141  |  102  |  10  ----------------------------<  90  4.3   |  25  |  0.86    Ca    8.9      21 Dec 2017 07:33  Mg     1.9     12-    TPro  6.4  /  Alb  3.2<L>  /  TBili  0.8  /  DBili  x   /  AST  43<H>  /  ALT  17  /  AlkPhos  76  12-21      CARDIAC MARKERS ( 20 Dec 2017 15:29 )  x     / x     / 1039 U/L / x     / x      CARDIAC MARKERS ( 20 Dec 2017 13:42 )  x     / 0.02 ng/mL / x     / x     / 13.2 ng/mL  CARDIAC MARKERS ( 20 Dec 2017 04:25 )  x     / 0.01 ng/mL / 752 U/L / x     / 13.7 ng/mL      Urinalysis Basic - ( 20 Dec 2017 05:58 )    Color: Yellow / Appearance: Clear / S.021 / pH: x  Gluc: x / Ketone: Negative  / Bili: Negative / Urobili: 4   Blood: x / Protein: 30 mg/dL / Nitrite: Negative   Leuk Esterase: Negative / RBC: 0-2 /HPF / WBC 0-2 /HPF   Sq Epi: x / Non Sq Epi: x / Bacteria: Few /HPF        RADIOLOGY & ADDITIONAL TESTS:    Imaging Personally Reviewed:    Consultant(s) Notes Reviewed:      Care Discussed with Consultants/Other Providers:    Josie Borrego PA-C  [facility]   [id number] Patient is a 83y old  Male who presents was admitted with of Loss of Consciousness with resultant fall (20 Dec 2017 11:35)., Asked to see pt who was sitting on the floor. No fall. No injury      SUBJECTIVE / OVERNIGHT EVENTS:    MEDICATIONS  (STANDING):  aspirin enteric coated 81 milliGRAM(s) Oral daily  cholecalciferol 1000 Unit(s) Oral two times a day  docusate sodium 100 milliGRAM(s) Oral three times a day  famotidine    Tablet 20 milliGRAM(s) Oral two times a day  heparin  Injectable 5000 Unit(s) SubCutaneous every 8 hours  influenza   Vaccine 0.5 milliLiter(s) IntraMuscular once  memantine 10 milliGRAM(s) Oral at bedtime  midodrine 5 milliGRAM(s) Oral two times a day  multivitamin 1 Tablet(s) Oral daily  nystatin Powder 1 Application(s) Topical two times a day  polyethylene glycol 3350 17 Gram(s) Oral daily  QUEtiapine 25 milliGRAM(s) Oral at bedtime  sodium chloride 0.65% Nasal 1 Spray(s) Both Nostrils daily    MEDICATIONS  (PRN):  acetaminophen   Tablet 650 milliGRAM(s) Oral every 6 hours PRN For Temp greater than 38 C (100.4 F)  ALBUTerol/ipratropium for Nebulization 3 milliLiter(s) Nebulizer every 6 hours PRN Shortness of Breath and/or Wheezing  ondansetron Injectable 4 milliGRAM(s) IV Push every 6 hours PRN Nausea        CAPILLARY BLOOD GLUCOSE        I&O's Summary    20 Dec 2017 07:  -  21 Dec 2017 07:00  --------------------------------------------------------  IN: 920 mL / OUT: 1225 mL / NET: -305 mL    21 Dec 2017 07:  -  21 Dec 2017 15:13  --------------------------------------------------------  IN: 720 mL / OUT: 520 mL / NET: 200 mL        PHYSICAL EXAM:  GENERAL: NAD, well-developed  HEAD:  Atraumatic, Normocephalic, no trauma  EYES: sclera clear  NECK: Supple, No JVD  CHEST/LUNG: Clear to auscultation bilaterally; No wheeze  HEART: Regular rate and rhythm;   ABDOMEN: Soft, Nontender, Nondistended; Bowel sounds present  EXTREMITIES:  2+ Peripheral Pulses, No clubbing, cyanosis, or edema  PSYCH: baseline no agitation  NEUROLOGY: non-focal  SKIN: No rashes, laceration, contusion or lesions    LABS:                        12.7   6.86  )-----------( 203      ( 21 Dec 2017 07:34 )             38.2     12    141  |  102  |  10  ----------------------------<  90  4.3   |  25  |  0.86    Ca    8.9      21 Dec 2017 07:33  Mg     1.9         TPro  6.4  /  Alb  3.2<L>  /  TBili  0.8  /  DBili  x   /  AST  43<H>  /  ALT  17  /  AlkPhos  76  12-      CARDIAC MARKERS ( 20 Dec 2017 15:29 )  x     / x     / 1039 U/L / x     / x      CARDIAC MARKERS ( 20 Dec 2017 13:42 )  x     / 0.02 ng/mL / x     / x     / 13.2 ng/mL  CARDIAC MARKERS ( 20 Dec 2017 04:25 )  x     / 0.01 ng/mL / 752 U/L / x     / 13.7 ng/mL      Urinalysis Basic - ( 20 Dec 2017 05:58 )    Color: Yellow / Appearance: Clear / S.021 / pH: x  Gluc: x / Ketone: Negative  / Bili: Negative / Urobili: 4   Blood: x / Protein: 30 mg/dL / Nitrite: Negative   Leuk Esterase: Negative / RBC: 0-2 /HPF / WBC 0-2 /HPF   Sq Epi: x / Non Sq Epi: x / Bacteria: Few /HPF        RADIOLOGY & ADDITIONAL TESTS:    Imaging Personally Reviewed:    Consultant(s) Notes Reviewed:      Care Discussed with Consultants/Other Providers:    Josie Borrego PA-C  [facility]   [id number]

## 2017-12-21 NOTE — PROGRESS NOTE ADULT - SUBJECTIVE AND OBJECTIVE BOX
MEDICINE, PROGRESS NOTE [phone]    EVELIN CHAVEZ 83y [id number]    Patient seen and examined.  Patient is a 83y old  Male who presents with a chief complaint of Loss of Consciousness with resultant fall (20 Dec 2017 11:35)  pt feels ok.    PAST MEDICAL & SURGICAL HISTORY:  Alzheimer disease: with psuedobulbular affect  BPH (benign prostatic hypertrophy)  Renal calculi  Hyperlipidemia  GERD (gastroesophageal reflux disease)  Nephrolithiasis  H/O cataract extraction, right  S/P tonsillectomy    MEDICATIONS  (STANDING):  aspirin enteric coated 81 milliGRAM(s) Oral daily  cholecalciferol 1000 Unit(s) Oral two times a day  docusate sodium 100 milliGRAM(s) Oral three times a day  famotidine    Tablet 20 milliGRAM(s) Oral two times a day  heparin  Injectable 5000 Unit(s) SubCutaneous every 8 hours  influenza   Vaccine 0.5 milliLiter(s) IntraMuscular once  memantine 10 milliGRAM(s) Oral at bedtime  midodrine 5 milliGRAM(s) Oral two times a day  multivitamin 1 Tablet(s) Oral daily  nystatin Powder 1 Application(s) Topical two times a day  polyethylene glycol 3350 17 Gram(s) Oral daily  QUEtiapine 25 milliGRAM(s) Oral at bedtime  sodium chloride 0.65% Nasal 1 Spray(s) Both Nostrils daily    MEDICATIONS  (PRN):  acetaminophen   Tablet 650 milliGRAM(s) Oral every 6 hours PRN For Temp greater than 38 C (100.4 F)  ALBUTerol/ipratropium for Nebulization 3 milliLiter(s) Nebulizer every 6 hours PRN Shortness of Breath and/or Wheezing  ondansetron Injectable 4 milliGRAM(s) IV Push every 6 hours PRN Nausea    Allergies    IV dye (Rash)  penicillin (Rash)    Intolerances        PHYSICAL EXAM:  Constitutional: NAD  HEENT: Normocephalic, EOMI  Neck:  No JVD  Respiratory: CTA B/L, No wheezes  Cardiovascular: S1, S2, RRR, + systolic murmur  Gastrointestinal: BS+, soft, NT/ND  Extremities: No peripheral edema  Neurological: AAOX3, no focal deficits  Psychiatric: Normal mood, normal affect  : No Barone    Vital Signs Last 24 Hrs  T(C): 36.7 (21 Dec 2017 13:46), Max: 37.1 (21 Dec 2017 08:43)  T(F): 98 (21 Dec 2017 13:46), Max: 98.8 (21 Dec 2017 09:25)  HR: 85 (21 Dec 2017 13:46) (72 - 93)  BP: 129/79 (21 Dec 2017 13:46) (116/69 - 134/80)  BP(mean): --  RR: 16 (21 Dec 2017 13:46) (16 - 18)  SpO2: 95% (21 Dec 2017 13:46) (95% - 98%)  I&O's Summary    20 Dec 2017 07:01  -  21 Dec 2017 07:00  --------------------------------------------------------  IN: 920 mL / OUT: 1225 mL / NET: -305 mL    21 Dec 2017 07:01  -  21 Dec 2017 19:39  --------------------------------------------------------  IN: 900 mL / OUT: 1065 mL / NET: -165 mL        LABS:                        12.7   6.86  )-----------( 203      ( 21 Dec 2017 07:34 )             38.2     12-21    141  |  102  |  10  ----------------------------<  90  4.3   |  25  |  0.86    Ca    8.9      21 Dec 2017 07:33  Mg     1.9         TPro  6.4  /  Alb  3.2<L>  /  TBili  0.8  /  DBili  x   /  AST  43<H>  /  ALT  17  /  AlkPhos  76  12-21    CARDIAC MARKERS ( 20 Dec 2017 15:29 )  x     / x     / 1039 U/L / x     / x      CARDIAC MARKERS ( 20 Dec 2017 13:42 )  x     / 0.02 ng/mL / x     / x     / 13.2 ng/mL  CARDIAC MARKERS ( 20 Dec 2017 04:25 )  x     / 0.01 ng/mL / 752 U/L / x     / 13.7 ng/mL      Magnesium, Serum: 1.9 mg/dL (12- @ 07:33)    Urinalysis Basic - ( 20 Dec 2017 05:58 )    Color: Yellow / Appearance: Clear / S.021 / pH: x  Gluc: x / Ketone: Negative  / Bili: Negative / Urobili: 4   Blood: x / Protein: 30 mg/dL / Nitrite: Negative   Leuk Esterase: Negative / RBC: 0-2 /HPF / WBC 0-2 /HPF   Sq Epi: x / Non Sq Epi: x / Bacteria: Few /HPF

## 2017-12-21 NOTE — PROVIDER CONTACT NOTE (OTHER) - ASSESSMENT
VSS, NS on tele. Pt is very agitated, flailing arms, pt is A/Ox1, refusing to take PM medications (colace, heparin injection, namenda)

## 2017-12-21 NOTE — PROGRESS NOTE ADULT - SUBJECTIVE AND OBJECTIVE BOX
JILL NP     S/P Loop Recorder Interrogation     Indication: Bradycardia  - Tele: NSR 70's  - Battery Status: Good  - Review of stored data revealed 4 tachy and 29 pause episodes between 11/11/17-12/19/17. Tachy episodes on 12/16/17, 11/22 an 11/23. EGM consistent with SVT vs. A Tach.   "Pause" Episodes consistent with ventricular under sensing, two Sinus Bradycardia episodes on 12/15/17 SB 40 lasting 4 seconds, and sinus bradycardia 50 on 12/14 lasting 3 seconds.   -Changes Made: None    [place] [id number]

## 2017-12-22 LAB
HCT VFR BLD CALC: 41 % — SIGNIFICANT CHANGE UP (ref 39–50)
HGB BLD-MCNC: 13.6 G/DL — SIGNIFICANT CHANGE UP (ref 13–17)
MCHC RBC-ENTMCNC: 33.2 GM/DL — SIGNIFICANT CHANGE UP (ref 32–36)
MCHC RBC-ENTMCNC: 33.3 PG — SIGNIFICANT CHANGE UP (ref 27–34)
MCV RBC AUTO: 100.2 FL — HIGH (ref 80–100)
PLATELET # BLD AUTO: 221 K/UL — SIGNIFICANT CHANGE UP (ref 150–400)
RBC # BLD: 4.09 M/UL — LOW (ref 4.2–5.8)
RBC # FLD: 14.6 % — HIGH (ref 10.3–14.5)
WBC # BLD: 7.38 K/UL — SIGNIFICANT CHANGE UP (ref 3.8–10.5)
WBC # FLD AUTO: 7.38 K/UL — SIGNIFICANT CHANGE UP (ref 3.8–10.5)

## 2017-12-22 RX ORDER — MEMANTINE HYDROCHLORIDE 10 MG/1
5 TABLET ORAL DAILY
Qty: 0 | Refills: 0 | Status: DISCONTINUED | OUTPATIENT
Start: 2017-12-22 | End: 2018-01-03

## 2017-12-22 RX ORDER — HALOPERIDOL DECANOATE 100 MG/ML
1 INJECTION INTRAMUSCULAR ONCE
Qty: 0 | Refills: 0 | Status: COMPLETED | OUTPATIENT
Start: 2017-12-22 | End: 2017-12-22

## 2017-12-22 RX ADMIN — HEPARIN SODIUM 5000 UNIT(S): 5000 INJECTION INTRAVENOUS; SUBCUTANEOUS at 21:04

## 2017-12-22 RX ADMIN — Medication 81 MILLIGRAM(S): at 12:01

## 2017-12-22 RX ADMIN — MIDODRINE HYDROCHLORIDE 5 MILLIGRAM(S): 2.5 TABLET ORAL at 05:43

## 2017-12-22 RX ADMIN — HEPARIN SODIUM 5000 UNIT(S): 5000 INJECTION INTRAVENOUS; SUBCUTANEOUS at 14:44

## 2017-12-22 RX ADMIN — NYSTATIN CREAM 1 APPLICATION(S): 100000 CREAM TOPICAL at 05:44

## 2017-12-22 RX ADMIN — Medication 1 SPRAY(S): at 12:02

## 2017-12-22 RX ADMIN — Medication 1 TABLET(S): at 12:03

## 2017-12-22 RX ADMIN — NYSTATIN CREAM 1 APPLICATION(S): 100000 CREAM TOPICAL at 17:35

## 2017-12-22 RX ADMIN — HEPARIN SODIUM 5000 UNIT(S): 5000 INJECTION INTRAVENOUS; SUBCUTANEOUS at 05:44

## 2017-12-22 RX ADMIN — Medication 1000 UNIT(S): at 05:44

## 2017-12-22 RX ADMIN — FAMOTIDINE 20 MILLIGRAM(S): 10 INJECTION INTRAVENOUS at 05:43

## 2017-12-22 RX ADMIN — TAMSULOSIN HYDROCHLORIDE 0.4 MILLIGRAM(S): 0.4 CAPSULE ORAL at 21:04

## 2017-12-22 RX ADMIN — Medication 100 MILLIGRAM(S): at 14:44

## 2017-12-22 RX ADMIN — Medication 100 MILLIGRAM(S): at 21:04

## 2017-12-22 RX ADMIN — MEMANTINE HYDROCHLORIDE 10 MILLIGRAM(S): 10 TABLET ORAL at 21:04

## 2017-12-22 RX ADMIN — QUETIAPINE FUMARATE 25 MILLIGRAM(S): 200 TABLET, FILM COATED ORAL at 21:04

## 2017-12-22 RX ADMIN — Medication 100 MILLIGRAM(S): at 05:43

## 2017-12-22 RX ADMIN — HALOPERIDOL DECANOATE 1 MILLIGRAM(S): 100 INJECTION INTRAMUSCULAR at 14:44

## 2017-12-22 RX ADMIN — MIDODRINE HYDROCHLORIDE 5 MILLIGRAM(S): 2.5 TABLET ORAL at 17:36

## 2017-12-22 RX ADMIN — FAMOTIDINE 20 MILLIGRAM(S): 10 INJECTION INTRAVENOUS at 17:35

## 2017-12-22 RX ADMIN — POLYETHYLENE GLYCOL 3350 17 GRAM(S): 17 POWDER, FOR SOLUTION ORAL at 12:09

## 2017-12-22 RX ADMIN — Medication 1000 UNIT(S): at 17:34

## 2017-12-22 NOTE — CONSULT NOTE ADULT - SUBJECTIVE AND OBJECTIVE BOX
Patient is a 83y old  Male who presents with a chief complaint of Loss of Consciousness with resultant fall (20 Dec 2017 11:35)      HPI:  Patient is an 82 yo M w/ Alzheimer's Dementia (documented in previous admission as AAOx2 to 3), HLD, BPH,GI Bleed, nephrolithiasis, sinus bradycardia s/p loop recorder, multiple hospitalizations for falls now p/w unwitnessed fall. Patient unable to recount events. Wife at bedside reports that she was called at 145AM and told that he fell. As per triage note, fall was unwitnessed  and patient was found sitting in his wheelchair but reported falling. wife states that she was not aware of any CP, SOB, pain anywhere, fevers, chills pr cough.  Wife also states that patient appears swollen all over and patient was c/o pain on the knees.   As per wife, the rehab reported that patient fell from Wheelchair.     ED :  Tm=98.4F HR =   BP= 122-134/74-86   SPO2 = 92-95% RA .  Patient received a bolus of NS 500ml and one dose of aspirin 325 mg oral .  RVP : pos RSV     Previous admission   : 11/10-11/15   due to head trauma in setting of hypotension and sinus bradycardia and patient was d/c to Rehab facility. (20 Dec 2017 11:35)           *****PAST MEDICAL / Surgical  HISTORY:  PAST MEDICAL & SURGICAL HISTORY:  Alzheimer disease: with psuedobulbular affect  BPH (benign prostatic hypertrophy)  Renal calculi  Hyperlipidemia  GERD (gastroesophageal reflux disease)  Nephrolithiasis  H/O cataract extraction, right  S/P tonsillectomy           *****FAMILY HISTORY:  FAMILY HISTORY:  No pertinent family history in first degree relatives           *****SOCIAL HISTORY:  Alcohol: None  Smoking: None         *****ALLERGIES:   Allergies    IV dye (Rash)  penicillin (Rash)    Intolerances             *****MEDICATIONS:  MEDICATIONS  (STANDING):  aspirin enteric coated 81 milliGRAM(s) Oral daily  cholecalciferol 1000 Unit(s) Oral two times a day  docusate sodium 100 milliGRAM(s) Oral three times a day  famotidine    Tablet 20 milliGRAM(s) Oral two times a day  heparin  Injectable 5000 Unit(s) SubCutaneous every 8 hours  influenza   Vaccine 0.5 milliLiter(s) IntraMuscular once  memantine 10 milliGRAM(s) Oral at bedtime  midodrine 5 milliGRAM(s) Oral two times a day  multivitamin 1 Tablet(s) Oral daily  nystatin Powder 1 Application(s) Topical two times a day  polyethylene glycol 3350 17 Gram(s) Oral daily  QUEtiapine 25 milliGRAM(s) Oral at bedtime  sodium chloride 0.65% Nasal 1 Spray(s) Both Nostrils daily    MEDICATIONS  (PRN):  acetaminophen   Tablet 650 milliGRAM(s) Oral every 6 hours PRN For Temp greater than 38 C (100.4 F)  ALBUTerol/ipratropium for Nebulization 3 milliLiter(s) Nebulizer every 6 hours PRN Shortness of Breath and/or Wheezing  ondansetron Injectable 4 milliGRAM(s) IV Push every 6 hours PRN Nausea           *****REVIEW OF SYSTEM:  GEN: no fever, no chills, no pain  RESP: no SOB, no cough, no sputum  CVS: no chest pain, no palpitations, no edema  GI: no abdominal pain, no nausea, no vomiting, no constipation, no diarrhea  : no dysurea, no frequency, no hematurea  Neuro: no headache, no dizziness  PSYCH: no anxiety, no depression  Derm : no itching, no rash         *****VITAL SIGNS:  T(C): 37.1 (17 @ 09:25), Max: 37.1 (17 @ 08:43)  HR: 80 (17 @ 09:25) (80 - 101)  BP: 117/70 (17 @ 09:25) (116/69 - 145/80)  RR: 17 (17 @ 09:25) (17 - 19)  SpO2: 96% (17 @ 09:25) (95% - 98%)  Wt(kg): --     @ 07:01  -   @ 07:00  --------------------------------------------------------  IN: 920 mL / OUT: 1225 mL / NET: -305 mL     @ 07: @ 10:49  --------------------------------------------------------  IN: 360 mL / OUT: 120 mL / NET: 240 mL             *****PHYSICAL EXAM:  GEN: A&O X 3 , NAD , comfortable  HEENT: NCAT, PERRL, MMM, hearing intact  Neck: supple , no JVD  CVS: S1S2 , regular , No M/R/G appreciated  PULM: CTA B/L,  no W/R/R appreciated  ABD.: soft. non tender, non distended,  bowel sounds present  Extrem: intact pulses , no edema   Derm: No rash , no ecchymoses  PSYCH : normal mood,  no delusion not anxious         *****LAB AND IMAGIN.7   6.86  )-----------( 203      ( 21 Dec 2017 07:34 )             38.2                   141  |  102  |  10  ----------------------------<  90  4.3   |  25  |  0.86    Ca    8.9      21 Dec 2017 07:33  Mg     1.9         TPro  6.4  /  Alb  3.2<L>  /  TBili  0.8  /  DBili  x   /  AST  43<H>  /  ALT  17  /  AlkPhos  76                  CARDIAC MARKERS ( 20 Dec 2017 15:29 )  x     / x     / 1039 U/L / x     / x      CARDIAC MARKERS ( 20 Dec 2017 13:42 )  x     / 0.02 ng/mL / x     / x     / 13.2 ng/mL  CARDIAC MARKERS ( 20 Dec 2017 04:25 )  x     / 0.01 ng/mL / 752 U/L / x     / 13.7 ng/mL              Urinalysis Basic - ( 20 Dec 2017 05:58 )    Color: Yellow / Appearance: Clear / S.021 / pH: x  Gluc: x / Ketone: Negative  / Bili: Negative / Urobili: 4   Blood: x / Protein: 30 mg/dL / Nitrite: Negative   Leuk Esterase: Negative / RBC: 0-2 /HPF / WBC 0-2 /HPF   Sq Epi: x / Non Sq Epi: x / Bacteria: Few /HPF        [All pertinent recent Imaging reports reviewed]  < from: Transthoracic Echocardiogram (17 @ 17:12) >  Conclusions:  1. Mitral annular calcification, otherwise normal mitral  valve. Minimal mitral regurgitation.  2. Aortic valve not well visualized; appears calcified.  Minimal aortic regurgitation.  3. Endocardium not well visualized; left ventricle appears  hyperdynamic. EF approximately 75-80% by visualestimation.  Patient was unable to consent for use of intravenous echo  contrast.  4. The right ventricle is not well visualized; grossly  normal right ventricular size and systolic function.    < end of copied text >         *****A S S E S S M E N T   A N D   P L A N :    83M viral URI, fall vs syncope  pt baseline dementia, unable to give details  has ILR for prior bradycardia  called to have device checked  cont tele  echo , no need to repeat  supportive tx for URI  PT    ___________________________  Will follow with you.  Thank you,  MARY Dunlap D.O.
THORACIC SURGERY CONSULT NOTE    Patient is a 83y old  Male who presents with a chief complaint of Loss of Consciousness with resultant fall (20 Dec 2017 11:35)      HPI:  Patient is an 84 yo M w/ Alzheimer's Dementia (documented in previous admission as AAOx2 to 3), HLD, BPH,GI Bleed, nephrolithiasis, sinus bradycardia s/p loop recorder, multiple hospitalizations for falls now p/w unwitnessed fall. As per wife, the rehab reported that patient fell from Wheelchair. On further work up, patient was found to have an isolated R 9th rib fx that was mildly displaced.       PAST MEDICAL & SURGICAL HISTORY:  Alzheimer disease: with psuedobulbular affect  BPH (benign prostatic hypertrophy)  Renal calculi  Hyperlipidemia  GERD (gastroesophageal reflux disease)  Nephrolithiasis  H/O cataract extraction, right  S/P tonsillectomy    [  ] No significant past history as reviewed with the patient and family    FAMILY HISTORY:  No pertinent family history in first degree relatives    [  ] Family history not pertinent as reviewed with the patient and family    SOCIAL HISTORY:    MEDICATIONS  (STANDING):  aspirin enteric coated 81 milliGRAM(s) Oral daily  cholecalciferol 1000 Unit(s) Oral two times a day  docusate sodium 100 milliGRAM(s) Oral three times a day  famotidine    Tablet 20 milliGRAM(s) Oral two times a day  heparin  Injectable 5000 Unit(s) SubCutaneous every 8 hours  influenza   Vaccine 0.5 milliLiter(s) IntraMuscular once  memantine 10 milliGRAM(s) Oral at bedtime  midodrine 5 milliGRAM(s) Oral two times a day  multivitamin 1 Tablet(s) Oral daily  nystatin Powder 1 Application(s) Topical two times a day  polyethylene glycol 3350 17 Gram(s) Oral daily  QUEtiapine 25 milliGRAM(s) Oral at bedtime  sodium chloride 0.65% Nasal 1 Spray(s) Both Nostrils daily    MEDICATIONS  (PRN):  acetaminophen   Tablet 650 milliGRAM(s) Oral every 6 hours PRN For Temp greater than 38 C (100.4 F)  ALBUTerol/ipratropium for Nebulization 3 milliLiter(s) Nebulizer every 6 hours PRN Shortness of Breath and/or Wheezing  ondansetron Injectable 4 milliGRAM(s) IV Push every 6 hours PRN Nausea    Allergies    IV dye (Rash)  penicillin (Rash)    Intolerances        Vital Signs Last 24 Hrs  T(C): 36.8 (20 Dec 2017 20:46), Max: 36.9 (20 Dec 2017 02:57)  T(F): 98.3 (20 Dec 2017 20:46), Max: 98.5 (20 Dec 2017 08:13)  HR: 91 (20 Dec 2017 20:46) (87 - 116)  BP: 116/69 (20 Dec 2017 20:46) (108/69 - 145/80)  BP(mean): --  RR: 18 (20 Dec 2017 20:46) (18 - 20)  SpO2: 98% (20 Dec 2017 20:46) (92% - 98%)  Daily Height in cm: 160.02 (20 Dec 2017 10:58)    Daily Weight in k.7 (20 Dec 2017 10:58)    Exam:  General: resting comfortably  HEENT: NCAT, MMM  Resp: nonlabored on room air  Chest: equal chest rise, no chest wall tenderness  Abd: soft  Ext: well perfused                            14.2   9.3   )-----------( 233      ( 20 Dec 2017 04:25 )             42.2     12-20    144  |  102  |  14  ----------------------------<  105<H>  4.6   |  29  |  1.05    Ca    9.6      20 Dec 2017 04:25    TPro  7.3  /  Alb  4.1  /  TBili  0.6  /  DBili  x   /  AST  29  /  ALT  20  /  AlkPhos  84  12-20      Urinalysis Basic - ( 20 Dec 2017 05:58 )    Color: Yellow / Appearance: Clear / S.021 / pH: x  Gluc: x / Ketone: Negative  / Bili: Negative / Urobili: 4   Blood: x / Protein: 30 mg/dL / Nitrite: Negative   Leuk Esterase: Negative / RBC: 0-2 /HPF / WBC 0-2 /HPF   Sq Epi: x / Non Sq Epi: x / Bacteria: Few /HPF        IMAGING STUDIES:  < from: CT Chest No Cont (17 @ 04:40) >  FINDINGS:    CHEST:     LUNGS AND LARGE AIRWAYS: Patent central airways.  No pulmonary nodules.  PLEURA: No pleural effusion. No pneumothorax.  VESSELS: Atheromatous disease of the aorta andcoronary arteries.  HEART: Heart size is normal. No pericardial effusion.   MEDIASTINUM AND MARY: No lymphadenopathy.  CHEST WALL AND LOWER NECK: Within normal limits.  VISUALIZED UPPER ABDOMEN: Small hiatal hernia. Hepatic cysts and   subcentimeter hypodense hepatic foci too small to characterize. Small   bilateral renal cysts.  BONES: Acute, minimally displaced right ninth rib fracture. Chronic right   fourth and fifth rib fracture deformities. Chronic left third through   sixth rib fracture deformities. Degenerative changes of the spine.    IMPRESSION:   Acute, minimally displaced right ninth rib fracture.  No pneumothorax.    < end of copied text >
Chart reviewed and patient seen by undersigned    Asked to consult for agitation    Historians include chart (current and old), the pt. (a poor historian), and the NP    History of Present Illness  The patient is a 83 year old  WM known to me from recent admission last month. He suffers from dementia and falls. Readmitted here on 12/20/17 for fall, ?syncope, resultant rib fractures. Last admission he was very restless and often trying to get out of the chair and walk on his own. Today, the same situation is occurring. The pt. then received prn Haldol 1mg IV a few minutes before I arrived. He complains of restlessness. He was started on Seroquel 25mg qhs last admission and it was continued in the rehab. He calmed down with Seroquel. Now he is here with Resp. Syn. Virus. He denies dizziness. His head CT scan was negative for bleed, mass, stroke.     Past Psychiatric History  Dementia. ?pseudobulbar affect    Psychosocial History  No substance abuse history. Lives with wife. Wife last admission denied that pt. ever had dementia or a psych. history but he was admitted on Memantine and had Nudexta discontinued before the last admission. Retired . One son lives in a nursing home.     PAST MEDICAL & SURGICAL HISTORY:  Alzheimer disease: with pseudobulbar affect  BPH (benign prostatic hypertrophy)  Renal calculi  Hyperlipidemia  GERD (gastroesophageal reflux disease)  Nephrolithiasis  H/O cataract extraction, right  S/P tonsillectomy      FAMILY HISTORY:  No pertinent family history in first degree relatives      Vital Signs Last 24 Hrs  T(C): 37 (22 Dec 2017 14:35), Max: 37 (22 Dec 2017 14:35)  T(F): 98.6 (22 Dec 2017 14:35), Max: 98.6 (22 Dec 2017 14:35)  HR: 81 (22 Dec 2017 14:35) (81 - 118)  BP: 134/86 (22 Dec 2017 14:35) (118/80 - 147/81)  BP(mean): --  RR: 18 (22 Dec 2017 14:35) (18 - 18)  SpO2: 95% (22 Dec 2017 14:35) (95% - 96%)                          13.6   7.38  )-----------( 221      ( 22 Dec 2017 08:52 )             41.0       12-21    141  |  102  |  10  ----------------------------<  90  4.3   |  25  |  0.86    Ca    8.9      21 Dec 2017 07:33  Mg     1.9     12-21    TPro  6.4  /  Alb  3.2<L>  /  TBili  0.8  /  DBili  x   /  AST  43<H>  /  ALT  17  /  AlkPhos  76  12-21      QTc 446ms      MEDICATIONS  (STANDING):  aspirin enteric coated 81 milliGRAM(s) Oral daily  cholecalciferol 1000 Unit(s) Oral two times a day  docusate sodium 100 milliGRAM(s) Oral three times a day  famotidine    Tablet 20 milliGRAM(s) Oral two times a day  heparin  Injectable 5000 Unit(s) SubCutaneous every 8 hours  influenza   Vaccine 0.5 milliLiter(s) IntraMuscular once  memantine 10 milliGRAM(s) Oral at bedtime  midodrine 5 milliGRAM(s) Oral two times a day  multivitamin 1 Tablet(s) Oral daily  nystatin Powder 1 Application(s) Topical two times a day  polyethylene glycol 3350 17 Gram(s) Oral daily  QUEtiapine 25 milliGRAM(s) Oral at bedtime  sodium chloride 0.65% Nasal 1 Spray(s) Both Nostrils daily  tamsulosin 0.4 milliGRAM(s) Oral at bedtime    MEDICATIONS  (PRN):  acetaminophen   Tablet 650 milliGRAM(s) Oral every 6 hours PRN For Temp greater than 38 C (100.4 F)  ALBUTerol/ipratropium for Nebulization 3 milliLiter(s) Nebulizer every 6 hours PRN Shortness of Breath and/or Wheezing  ondansetron Injectable 4 milliGRAM(s) IV Push every 6 hours PRN Nausea      Elderly WM in chair, restless, complains of restlessness, alert and oriented x 2 (not to place). Insight and judgment are poor. Speech is sparse with normal rate and low volume. No hallucinations nor delusions. The patient denied suicidal and homicidal ideation and plan. Mood is euthymic and affect constricted. Attention and concentration, short term memory impaired.  Long term memory within normal limits.     Suicidal risk assessment  Risk factors include being an elderly WM with confusion  Protective factors include no plan, no past attempts, no guns/psychosis/substance abuse.

## 2017-12-22 NOTE — CHART NOTE - NSCHARTNOTEFT_GEN_A_CORE
Notified by RN of pt getting out of bed. Pt being restless and agitated. Pt seen and examined. Pt found standing up from his chair. Pt noted to be  restless/agitated and concern for fall. Spoke with Dr. Tafoya, plan to order Haldol 1mg IVP x1 dose  and Dr. Braxton, psychiatrist consulted. Will continue to monitor.       Leena Olvera, [place]  [id number]

## 2017-12-22 NOTE — PROGRESS NOTE ADULT - SUBJECTIVE AND OBJECTIVE BOX
- Patient seen and examined.  - In summary, patient is a 83y year old man who presented with Loss of Consciousness with resultant fall (20 Dec 2017 11:35)  - Today, patient is without complaints.         *****MEDICATIONS:    MEDICATIONS  (STANDING):  aspirin enteric coated 81 milliGRAM(s) Oral daily  cholecalciferol 1000 Unit(s) Oral two times a day  docusate sodium 100 milliGRAM(s) Oral three times a day  famotidine    Tablet 20 milliGRAM(s) Oral two times a day  heparin  Injectable 5000 Unit(s) SubCutaneous every 8 hours  influenza   Vaccine 0.5 milliLiter(s) IntraMuscular once  memantine 10 milliGRAM(s) Oral at bedtime  midodrine 5 milliGRAM(s) Oral two times a day  multivitamin 1 Tablet(s) Oral daily  nystatin Powder 1 Application(s) Topical two times a day  polyethylene glycol 3350 17 Gram(s) Oral daily  QUEtiapine 25 milliGRAM(s) Oral at bedtime  sodium chloride 0.65% Nasal 1 Spray(s) Both Nostrils daily  tamsulosin 0.4 milliGRAM(s) Oral at bedtime    MEDICATIONS  (PRN):  acetaminophen   Tablet 650 milliGRAM(s) Oral every 6 hours PRN For Temp greater than 38 C (100.4 F)  ALBUTerol/ipratropium for Nebulization 3 milliLiter(s) Nebulizer every 6 hours PRN Shortness of Breath and/or Wheezing  ondansetron Injectable 4 milliGRAM(s) IV Push every 6 hours PRN Nausea           ***** REVIEW OF SYSTEM:  GEN: no fever, no chills, no pain  RESP: no SOB, no cough, no sputum  CVS: no chest pain, no palpitations, no edema  GI: no abdominal pain, no nausea, no vomiting, no constipation, no diarrhea  : no dysurea, no frequency  NEURO: no headache, no diziness  PSYCH: no depression, not anxious  Derm : no itching, no rash         ***** VITAL SIGNS:  T(F): 97.6 (17 @ 05:14), Max: 98.8 (17 @ 09:25)  HR: 84 (17 @ 05:14) (72 - 93)  BP: 128/81 (17 @ 05:14) (117/70 - 147/81)  RR: 18 (17 @ 05:14) (16 - 18)  SpO2: 95% (17 @ 05:14) (95% - 97%)  Wt(kg): --  ,   I&O's Summary    21 Dec 2017 07:  -  22 Dec 2017 07:00  --------------------------------------------------------  IN: 900 mL / OUT: 2090 mL / NET: -1190 mL    22 Dec 2017 07:  -  22 Dec 2017 08:29  --------------------------------------------------------  IN: 480 mL / OUT: 0 mL / NET: 480 mL             *****PHYSICAL EXAM:  GEN: A&O X 3 , NAD , comfortable  HEENT: NCAT, EOMI, MMM, no icterus  NECK: Supple, No JVD  CVS: S1S2 , regular , No M/R/G appreciated  PULM: CTA B/L,  no W/R/R appreciated  ABD.: soft. non tender, non distended,  bowel sounds present  Extrem: intact pulses , no edema noted  Derm: No rash or ecchymosis noted  PSYCH: normal mood, no depression, not anxious         *****LAB AND IMAGIN.7   6.86  )-----------( 203      ( 21 Dec 2017 07:34 )             38.2               -    141  |  102  |  10  ----------------------------<  90  4.3   |  25  |  0.86    Ca    8.9      21 Dec 2017 07:33  Mg     1.9         TPro  6.4  /  Alb  3.2<L>  /  TBili  0.8  /  DBili  x   /  AST  43<H>  /  ALT  17  /  AlkPhos  76  12-           CARDIAC MARKERS ( 20 Dec 2017 15:29 )  x     / x     / 1039 U/L / x     / x      CARDIAC MARKERS ( 20 Dec 2017 13:42 )  x     / 0.02 ng/mL / x     / x     / 13.2 ng/mL                [All pertinent recent Imaging/Reports reviewed]  < from: Transthoracic Echocardiogram (17 @ 15:00) >  Conclusions:  1. Normal left ventricular internal dimensions and wall  thicknesses.  2. Normal left ventricular systolic function. No segmental  wall motion abnormalities.  3. Normal diastolic function  4. Normal right ventricular size and function.  *** Compared with echocardiogram of 2017, no  significant changes noted.    < end of copied text >         *****A S S E S S M E N T   A N D   P L A N :  83M viral URI, fall vs syncope  pt baseline dementia, unable to give details of episode  ILR with brief episodes of bradycardia as well as PAT/SVT  echo , no need to repeat  supportive tx for URI  PT  DCP      __________________________  MARY Dunlap D.O.

## 2017-12-22 NOTE — PHYSICAL THERAPY INITIAL EVALUATION ADULT - PERTINENT HX OF CURRENT PROBLEM, REHAB EVAL
Pt with mild dementia admitted s/p unwitnessed fall from w/c at rehab. CT head and cspine negative. CT chest: Acute, minimally displaced right ninth rib fracture. No pneumothorax. ILR with brief episodes of bradycardia as well as PAT/SVT. Echo 12/21 unremarkable

## 2017-12-22 NOTE — PROGRESS NOTE ADULT - SUBJECTIVE AND OBJECTIVE BOX
MEDICINE, PROGRESS NOTE [phone]    EVELIN CHAVEZ 83y [id number]    Patient seen and examined.  Patient is a 83y old  Male who presents with a chief complaint of Loss of Consciousness with resultant fall (20 Dec 2017 11:35)  Pt is laying in chair slightly sedated.    PAST MEDICAL & SURGICAL HISTORY:  Alzheimer disease: with psuedobulbular affect  BPH (benign prostatic hypertrophy)  Renal calculi  Hyperlipidemia  GERD (gastroesophageal reflux disease)  Nephrolithiasis  H/O cataract extraction, right  S/P tonsillectomy    MEDICATIONS  (STANDING):  aspirin enteric coated 81 milliGRAM(s) Oral daily  cholecalciferol 1000 Unit(s) Oral two times a day  docusate sodium 100 milliGRAM(s) Oral three times a day  famotidine    Tablet 20 milliGRAM(s) Oral two times a day  heparin  Injectable 5000 Unit(s) SubCutaneous every 8 hours  influenza   Vaccine 0.5 milliLiter(s) IntraMuscular once  memantine 10 milliGRAM(s) Oral at bedtime  midodrine 5 milliGRAM(s) Oral two times a day  multivitamin 1 Tablet(s) Oral daily  nystatin Powder 1 Application(s) Topical two times a day  polyethylene glycol 3350 17 Gram(s) Oral daily  QUEtiapine 25 milliGRAM(s) Oral at bedtime  sodium chloride 0.65% Nasal 1 Spray(s) Both Nostrils daily  tamsulosin 0.4 milliGRAM(s) Oral at bedtime    MEDICATIONS  (PRN):  acetaminophen   Tablet 650 milliGRAM(s) Oral every 6 hours PRN For Temp greater than 38 C (100.4 F)  ALBUTerol/ipratropium for Nebulization 3 milliLiter(s) Nebulizer every 6 hours PRN Shortness of Breath and/or Wheezing  ondansetron Injectable 4 milliGRAM(s) IV Push every 6 hours PRN Nausea    Allergies    IV dye (Rash)  penicillin (Rash)    Intolerances        PHYSICAL EXAM:  Constitutional: NAD  HEENT: Normocephalic, EOMI  Neck:  No JVD  Respiratory: CTA B/L, No wheezes  Cardiovascular: S1, S2, RRR, + systolic murmur  Gastrointestinal: BS+, soft, NT/ND  Extremities: No peripheral edema  Neurological: AAOX1, no focal deficits  Psychiatric: flat affect  : No Barone    Vital Signs Last 24 Hrs  T(C): 37 (22 Dec 2017 14:35), Max: 37 (22 Dec 2017 14:35)  T(F): 98.6 (22 Dec 2017 14:35), Max: 98.6 (22 Dec 2017 14:35)  HR: 81 (22 Dec 2017 14:35) (81 - 118)  BP: 134/86 (22 Dec 2017 14:35) (118/80 - 147/81)  BP(mean): --  RR: 18 (22 Dec 2017 14:35) (18 - 18)  SpO2: 95% (22 Dec 2017 14:35) (95% - 96%)  I&O's Summary    21 Dec 2017 07:01  -  22 Dec 2017 07:00  --------------------------------------------------------  IN: 900 mL / OUT: 2090 mL / NET: -1190 mL    22 Dec 2017 07:01  -  22 Dec 2017 16:05  --------------------------------------------------------  IN: 900 mL / OUT: 300 mL / NET: 600 mL        LABS:                        13.6   7.38  )-----------( 221      ( 22 Dec 2017 08:52 )             41.0     12-21    141  |  102  |  10  ----------------------------<  90  4.3   |  25  |  0.86    Ca    8.9      21 Dec 2017 07:33  Mg     1.9     12-21    TPro  6.4  /  Alb  3.2<L>  /  TBili  0.8  /  DBili  x   /  AST  43<H>  /  ALT  17  /  AlkPhos  76  12-21

## 2017-12-22 NOTE — PHYSICAL THERAPY INITIAL EVALUATION ADULT - GAIT DEVIATIONS NOTED, PT EVAL
decreased step length/unsteady gait/decreased weight-shifting ability/decreased nelda/decreased stride length

## 2017-12-22 NOTE — CHART NOTE - NSCHARTNOTEFT_GEN_A_CORE
Interval events  Patient restless, continuos attempts to climb out of bed and chair with unsteady gait.  not agitated    plan  Ativan if agitated per psych recommendation, not agitated now  Will avoid sedation as patient got sedated with haldol earlier  Constant observation, will change to Enhanced observation once patient appears more calm.  Will continue to monitor  Will update with primary team    Carolyn STEPHENS BC  Spectra# [phone]

## 2017-12-22 NOTE — PHYSICAL THERAPY INITIAL EVALUATION ADULT - LIVES WITH, PROFILE
spouse/lives in a house with steps to enter- pt unable to quantify how many, or be able to give more details about prior level of function. Pt was at a rehab setting PTA

## 2017-12-22 NOTE — CONSULT NOTE ADULT - ASSESSMENT
84yo s/p fall with R 9th rib fx    - no acute thoracic surgery intervention  - pain control  - incentive spirometry  - please call if any issues or change in patient status  - care as per primary team    discussed with Dr. Mullins  Thoracic surgery  [id number]
Dementia with behavioral disturbance.  Rule out akathisia  (restlessness from Seroquel and Haldol?).      Recommend  Ativan 0.25mg IV q12h prn severe restlessness  Follow QTc  Hold Seroquel if pt is orthostatic, hypotensive, QTc is 500ms or greater, or if very restless  Namenda 5mg qa.m. and 10mg qhs  Enhanced supervision.  Will follow with you here.    Filemon Braxton M.D.  Psychiatry  [phone]

## 2017-12-23 LAB
ANION GAP SERPL CALC-SCNC: 15 MMOL/L — SIGNIFICANT CHANGE UP (ref 5–17)
BUN SERPL-MCNC: 18 MG/DL — SIGNIFICANT CHANGE UP (ref 7–23)
CALCIUM SERPL-MCNC: 9.4 MG/DL — SIGNIFICANT CHANGE UP (ref 8.4–10.5)
CHLORIDE SERPL-SCNC: 102 MMOL/L — SIGNIFICANT CHANGE UP (ref 96–108)
CO2 SERPL-SCNC: 24 MMOL/L — SIGNIFICANT CHANGE UP (ref 22–31)
CREAT SERPL-MCNC: 1.08 MG/DL — SIGNIFICANT CHANGE UP (ref 0.5–1.3)
GLUCOSE SERPL-MCNC: 186 MG/DL — HIGH (ref 70–99)
POTASSIUM SERPL-MCNC: 4.4 MMOL/L — SIGNIFICANT CHANGE UP (ref 3.5–5.3)
POTASSIUM SERPL-SCNC: 4.4 MMOL/L — SIGNIFICANT CHANGE UP (ref 3.5–5.3)
SODIUM SERPL-SCNC: 141 MMOL/L — SIGNIFICANT CHANGE UP (ref 135–145)

## 2017-12-23 PROCEDURE — 93970 EXTREMITY STUDY: CPT | Mod: 26

## 2017-12-23 PROCEDURE — 93010 ELECTROCARDIOGRAM REPORT: CPT

## 2017-12-23 RX ADMIN — Medication 81 MILLIGRAM(S): at 14:31

## 2017-12-23 RX ADMIN — NYSTATIN CREAM 1 APPLICATION(S): 100000 CREAM TOPICAL at 18:26

## 2017-12-23 RX ADMIN — MEMANTINE HYDROCHLORIDE 5 MILLIGRAM(S): 10 TABLET ORAL at 14:32

## 2017-12-23 RX ADMIN — Medication 1000 UNIT(S): at 18:26

## 2017-12-23 RX ADMIN — Medication 100 MILLIGRAM(S): at 20:32

## 2017-12-23 RX ADMIN — Medication 100 MILLIGRAM(S): at 14:31

## 2017-12-23 RX ADMIN — FAMOTIDINE 20 MILLIGRAM(S): 10 INJECTION INTRAVENOUS at 18:26

## 2017-12-23 RX ADMIN — Medication 1 TABLET(S): at 14:31

## 2017-12-23 RX ADMIN — HEPARIN SODIUM 5000 UNIT(S): 5000 INJECTION INTRAVENOUS; SUBCUTANEOUS at 23:19

## 2017-12-23 RX ADMIN — Medication 30 MILLILITER(S): at 17:27

## 2017-12-23 RX ADMIN — HEPARIN SODIUM 5000 UNIT(S): 5000 INJECTION INTRAVENOUS; SUBCUTANEOUS at 14:31

## 2017-12-23 RX ADMIN — MIDODRINE HYDROCHLORIDE 5 MILLIGRAM(S): 2.5 TABLET ORAL at 18:26

## 2017-12-23 RX ADMIN — QUETIAPINE FUMARATE 25 MILLIGRAM(S): 200 TABLET, FILM COATED ORAL at 20:32

## 2017-12-23 RX ADMIN — TAMSULOSIN HYDROCHLORIDE 0.4 MILLIGRAM(S): 0.4 CAPSULE ORAL at 20:32

## 2017-12-23 RX ADMIN — POLYETHYLENE GLYCOL 3350 17 GRAM(S): 17 POWDER, FOR SOLUTION ORAL at 14:31

## 2017-12-23 RX ADMIN — MEMANTINE HYDROCHLORIDE 10 MILLIGRAM(S): 10 TABLET ORAL at 20:32

## 2017-12-23 RX ADMIN — Medication 1 SPRAY(S): at 14:32
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
## 2017-12-23 NOTE — PROVIDER CONTACT NOTE (OTHER) - ASSESSMENT
Pt A&Ox2, hx of alzheimer's dementia. Pt appears restless, agitated. Non-compliant to bed alarms, chair alarms. Pt has generalized weakness, unsteady gait.

## 2017-12-23 NOTE — PROGRESS NOTE ADULT - SUBJECTIVE AND OBJECTIVE BOX
- Patient seen and examined.  - In summary, patient is a 83y year old man who presented with Loss of Consciousness with resultant fall (20 Dec 2017 11:35)  - Today, patient is without complaints.         *****MEDICATIONS:    MEDICATIONS  (STANDING):  aspirin enteric coated 81 milliGRAM(s) Oral daily  cholecalciferol 1000 Unit(s) Oral two times a day  docusate sodium 100 milliGRAM(s) Oral three times a day  famotidine    Tablet 20 milliGRAM(s) Oral two times a day  heparin  Injectable 5000 Unit(s) SubCutaneous every 8 hours  influenza   Vaccine 0.5 milliLiter(s) IntraMuscular once  memantine 5 milliGRAM(s) Oral daily  memantine 10 milliGRAM(s) Oral at bedtime  midodrine 5 milliGRAM(s) Oral two times a day  multivitamin 1 Tablet(s) Oral daily  nystatin Powder 1 Application(s) Topical two times a day  polyethylene glycol 3350 17 Gram(s) Oral daily  QUEtiapine 25 milliGRAM(s) Oral at bedtime  sodium chloride 0.65% Nasal 1 Spray(s) Both Nostrils daily  tamsulosin 0.4 milliGRAM(s) Oral at bedtime    MEDICATIONS  (PRN):  acetaminophen   Tablet 650 milliGRAM(s) Oral every 6 hours PRN For Temp greater than 38 C (100.4 F)  ALBUTerol/ipratropium for Nebulization 3 milliLiter(s) Nebulizer every 6 hours PRN Shortness of Breath and/or Wheezing  ondansetron Injectable 4 milliGRAM(s) IV Push every 6 hours PRN Nausea             ***** REVIEW OF SYSTEM:  GEN: no fever, no chills, no pain  RESP: no SOB, no cough, no sputum  CVS: no chest pain, no palpitations, no edema  GI: no abdominal pain, no nausea, no vomiting, no constipation, no diarrhea  : no dysurea, no frequency  NEURO: no headache, no diziness  PSYCH: no depression, not anxious  Derm : no itching, no rash         ***** VITAL SIGNS:    T(F): 98.4 (17 @ 20:29), Max: 98.6 (17 @ 14:35)  HR: 105 (17 @ 04:58) (81 - 118)  BP: 148/75 (17 @ 04:58) (118/80 - 148/75)  RR: 18 (17 @ 04:58) (18 - 18)  SpO2: 97% (17 @ 04:58) (95% - 97%)  Wt(kg): --  ,   I&O's Summary    22 Dec 2017 07:  -  23 Dec 2017 07:00  --------------------------------------------------------  IN: 1200 mL / OUT: 2740 mL / NET: -1540 mL    23 Dec 2017 07:  -  23 Dec 2017 08:46  --------------------------------------------------------  IN: 680 mL / OUT: 100 mL / NET: 580 mL             *****PHYSICAL EXAM:  GEN: A&O X 3 , NAD , comfortable  HEENT: NCAT, EOMI, MMM, no icterus  NECK: Supple, No JVD  CVS: S1S2 , regular , No M/R/G appreciated  PULM: CTA B/L,  no W/R/R appreciated  ABD.: soft. non tender, non distended,  bowel sounds present  Extrem: intact pulses , no edema noted  Derm: No rash or ecchymosis noted  PSYCH: normal mood, no depression, not anxious         *****LAB AND IMAGIN.6   7.38  )-----------( 221      ( 22 Dec 2017 08:52 )             41.0                           [All pertinent recent Imaging/Reports reviewed]  < from: Transthoracic Echocardiogram (17 @ 15:00) >  Conclusions:  1. Normal left ventricular internal dimensions and wall  thicknesses.  2. Normal left ventricular systolic function. No segmental  wall motion abnormalities.  3. Normal diastolic function  4. Normal right ventricular size and function.  *** Compared with echocardiogram of 2017, no  significant changes noted.    < end of copied text >         *****A S S E S S M E N T   A N D   P L A N :  83M viral URI, fall vs syncope  pt baseline dementia, unable to give details of episode  ILR with brief episodes of bradycardia as well as PAT/SVT  echo , no need to repeat  supportive tx for URI  PT  DCP      __________________________  A. EVGENY Dunlap.

## 2017-12-23 NOTE — PROGRESS NOTE ADULT - SUBJECTIVE AND OBJECTIVE BOX
MEDICINE, PROGRESS NOTE [phone]    EVELIN CHAVEZ 83y [id number]    Patient seen and examined.  Patient is a 83y old  Male who presents with a chief complaint of Loss of Consciousness with resultant fall (20 Dec 2017 11:35)      PAST MEDICAL & SURGICAL HISTORY:  Alzheimer disease: with psuedobulbular affect  BPH (benign prostatic hypertrophy)  Renal calculi  Hyperlipidemia  GERD (gastroesophageal reflux disease)  Nephrolithiasis  H/O cataract extraction, right  S/P tonsillectomy    MEDICATIONS  (STANDING):  aspirin enteric coated 81 milliGRAM(s) Oral daily  cholecalciferol 1000 Unit(s) Oral two times a day  docusate sodium 100 milliGRAM(s) Oral three times a day  famotidine    Tablet 20 milliGRAM(s) Oral two times a day  heparin  Injectable 5000 Unit(s) SubCutaneous every 8 hours  influenza   Vaccine 0.5 milliLiter(s) IntraMuscular once  memantine 5 milliGRAM(s) Oral daily  memantine 10 milliGRAM(s) Oral at bedtime  midodrine 5 milliGRAM(s) Oral two times a day  multivitamin 1 Tablet(s) Oral daily  nystatin Powder 1 Application(s) Topical two times a day  polyethylene glycol 3350 17 Gram(s) Oral daily  QUEtiapine 25 milliGRAM(s) Oral at bedtime  sodium chloride 0.65% Nasal 1 Spray(s) Both Nostrils daily  tamsulosin 0.4 milliGRAM(s) Oral at bedtime    MEDICATIONS  (PRN):  acetaminophen   Tablet 650 milliGRAM(s) Oral every 6 hours PRN For Temp greater than 38 C (100.4 F)  ALBUTerol/ipratropium for Nebulization 3 milliLiter(s) Nebulizer every 6 hours PRN Shortness of Breath and/or Wheezing  aluminum hydroxide/magnesium hydroxide/simethicone Suspension 30 milliLiter(s) Oral every 6 hours PRN Dyspepsia  ondansetron Injectable 4 milliGRAM(s) IV Push every 6 hours PRN Nausea    Allergies    IV dye (Rash)  penicillin (Rash)    Intolerances        PHYSICAL EXAM:  Constitutional: NAD  HEENT: Normocephalic, EOMI  Neck:  No JVD  Respiratory: CTA B/L, No wheezes  Cardiovascular: S1, S2, RRR, + systolic murmur  Gastrointestinal: BS+, soft, NT/ND  Extremities: No peripheral edema  Neurological: AAOX2, no focal deficits  Psychiatric: flat affect  : No Barone    Vital Signs Last 24 Hrs  T(C): 36.8 (23 Dec 2017 12:07), Max: 36.9 (22 Dec 2017 20:29)  T(F): 98.3 (23 Dec 2017 12:07), Max: 98.4 (22 Dec 2017 20:29)  HR: 97 (23 Dec 2017 12:07) (97 - 106)  BP: 127/83 (23 Dec 2017 12:07) (127/83 - 148/75)  BP(mean): --  RR: 18 (23 Dec 2017 12:07) (18 - 18)  SpO2: 95% (23 Dec 2017 12:07) (95% - 97%)  I&O's Summary    22 Dec 2017 07:01  -  23 Dec 2017 07:00  --------------------------------------------------------  IN: 1200 mL / OUT: 2740 mL / NET: -1540 mL    23 Dec 2017 07:01  -  23 Dec 2017 19:59  --------------------------------------------------------  IN: 1520 mL / OUT: 500 mL / NET: 1020 mL        LABS:                        13.6   7.38  )-----------( 221      ( 22 Dec 2017 08:52 )             41.0     12-23    141  |  102  |  18  ----------------------------<  186<H>  4.4   |  24  |  1.08    Ca    9.4      23 Dec 2017 10:08

## 2017-12-24 RX ADMIN — Medication 30 MILLILITER(S): at 03:05

## 2017-12-24 RX ADMIN — Medication 100 MILLIGRAM(S): at 22:16

## 2017-12-24 RX ADMIN — MIDODRINE HYDROCHLORIDE 5 MILLIGRAM(S): 2.5 TABLET ORAL at 18:34

## 2017-12-24 RX ADMIN — POLYETHYLENE GLYCOL 3350 17 GRAM(S): 17 POWDER, FOR SOLUTION ORAL at 11:51

## 2017-12-24 RX ADMIN — FAMOTIDINE 20 MILLIGRAM(S): 10 INJECTION INTRAVENOUS at 06:21

## 2017-12-24 RX ADMIN — TAMSULOSIN HYDROCHLORIDE 0.4 MILLIGRAM(S): 0.4 CAPSULE ORAL at 22:16

## 2017-12-24 RX ADMIN — Medication 1 TABLET(S): at 11:51

## 2017-12-24 RX ADMIN — Medication 81 MILLIGRAM(S): at 11:51

## 2017-12-24 RX ADMIN — QUETIAPINE FUMARATE 25 MILLIGRAM(S): 200 TABLET, FILM COATED ORAL at 22:16

## 2017-12-24 RX ADMIN — Medication 100 MILLIGRAM(S): at 06:21

## 2017-12-24 RX ADMIN — MIDODRINE HYDROCHLORIDE 5 MILLIGRAM(S): 2.5 TABLET ORAL at 06:21

## 2017-12-24 RX ADMIN — HEPARIN SODIUM 5000 UNIT(S): 5000 INJECTION INTRAVENOUS; SUBCUTANEOUS at 06:21

## 2017-12-24 RX ADMIN — FAMOTIDINE 20 MILLIGRAM(S): 10 INJECTION INTRAVENOUS at 18:34

## 2017-12-24 RX ADMIN — NYSTATIN CREAM 1 APPLICATION(S): 100000 CREAM TOPICAL at 06:22

## 2017-12-24 RX ADMIN — Medication 1000 UNIT(S): at 18:34

## 2017-12-24 RX ADMIN — MEMANTINE HYDROCHLORIDE 10 MILLIGRAM(S): 10 TABLET ORAL at 22:16

## 2017-12-24 RX ADMIN — MEMANTINE HYDROCHLORIDE 5 MILLIGRAM(S): 10 TABLET ORAL at 11:51

## 2017-12-24 RX ADMIN — Medication 1000 UNIT(S): at 06:21

## 2017-12-24 RX ADMIN — HEPARIN SODIUM 5000 UNIT(S): 5000 INJECTION INTRAVENOUS; SUBCUTANEOUS at 22:16

## 2017-12-24 NOTE — PROGRESS NOTE ADULT - SUBJECTIVE AND OBJECTIVE BOX
- Patient seen and examined.  - In summary, patient is a 83y year old man who presented with Loss of Consciousness with resultant fall (20 Dec 2017 11:35)  - Today, patient is without complaints.         *****MEDICATIONS:    MEDICATIONS  (STANDING):  aspirin enteric coated 81 milliGRAM(s) Oral daily  cholecalciferol 1000 Unit(s) Oral two times a day  docusate sodium 100 milliGRAM(s) Oral three times a day  famotidine    Tablet 20 milliGRAM(s) Oral two times a day  heparin  Injectable 5000 Unit(s) SubCutaneous every 8 hours  influenza   Vaccine 0.5 milliLiter(s) IntraMuscular once  memantine 5 milliGRAM(s) Oral daily  memantine 10 milliGRAM(s) Oral at bedtime  midodrine 5 milliGRAM(s) Oral two times a day  multivitamin 1 Tablet(s) Oral daily  nystatin Powder 1 Application(s) Topical two times a day  polyethylene glycol 3350 17 Gram(s) Oral daily  QUEtiapine 25 milliGRAM(s) Oral at bedtime  sodium chloride 0.65% Nasal 1 Spray(s) Both Nostrils daily  tamsulosin 0.4 milliGRAM(s) Oral at bedtime    MEDICATIONS  (PRN):  acetaminophen   Tablet 650 milliGRAM(s) Oral every 6 hours PRN For Temp greater than 38 C (100.4 F)  ALBUTerol/ipratropium for Nebulization 3 milliLiter(s) Nebulizer every 6 hours PRN Shortness of Breath and/or Wheezing  aluminum hydroxide/magnesium hydroxide/simethicone Suspension 30 milliLiter(s) Oral every 6 hours PRN Dyspepsia  ondansetron Injectable 4 milliGRAM(s) IV Push every 6 hours PRN Nausea           ***** REVIEW OF SYSTEM:  GEN: no fever, no chills, no pain  RESP: no SOB, no cough, no sputum  CVS: no chest pain, no palpitations, no edema  GI: no abdominal pain, no nausea, no vomiting, no constipation, no diarrhea  : no dysurea, no frequency  NEURO: no headache, no diziness  PSYCH: no depression, not anxious  Derm : no itching, no rash         ***** VITAL SIGNS:    T(F): 97.8 (17 @ 04:30), Max: 98.3 (17 @ 12:07)  HR: 100 (17 @ 04:30) (97 - 108)  BP: 119/80 (17 @ 04:30) (119/80 - 138/85)  RR: 18 (17 @ 04:30) (18 - 18)  SpO2: 97% (17 @ 04:30) (95% - 98%)  Wt(kg): --  ,   I&O's Summary    23 Dec 2017 07:  -  24 Dec 2017 07:00  --------------------------------------------------------  IN: 1900 mL / OUT: 1250 mL / NET: 650 mL    24 Dec 2017 07:  -  24 Dec 2017 09:15  --------------------------------------------------------  IN: 480 mL / OUT: 0 mL / NET: 480 mL                   *****PHYSICAL EXAM:  GEN: A&O X 3 , NAD , comfortable  HEENT: NCAT, EOMI, MMM, no icterus  NECK: Supple, No JVD  CVS: S1S2 , regular , No M/R/G appreciated  PULM: CTA B/L,  no W/R/R appreciated  ABD.: soft. non tender, non distended,  bowel sounds present  Extrem: intact pulses , no edema noted  Derm: No rash or ecchymosis noted  PSYCH: normal mood, no depression, not anxious         *****LAB AND IMAGIN-23    141  |  102  |  18  ----------------------------<  186<H>  4.4   |  24  |  1.08    Ca    9.4      23 Dec 2017 10:08            [All pertinent recent Imaging/Reports reviewed]  < from: Transthoracic Echocardiogram (17 @ 15:00) >  Conclusions:  1. Normal left ventricular internal dimensions and wall  thicknesses.  2. Normal left ventricular systolic function. No segmental  wall motion abnormalities.  3. Normal diastolic function  4. Normal right ventricular size and function.  *** Compared with echocardiogram of 2017, no  significant changes noted.    < end of copied text >         *****A S S E S S M E N T   A N D   P L A N :  83M viral URI, fall vs syncope  pt baseline dementia, unable to give details of episode  ILR with brief episodes of bradycardia as well as PAT/SVT  echo , no need to repeat  supportive tx for URI  PT  DCP      __________________________  A. EVGENY Dunlap.

## 2017-12-24 NOTE — CHART NOTE - NSCHARTNOTEFT_GEN_A_CORE
Patient seen to evaluate for continuation of 1:1.   84 y/o M w/ pmhx of Alzheimer's Dementia, HLD, BPH, GI Bleed, nephrolithiasis, sinus bradycardia s/p loop recorder, multiple hospitalizations for falls now admitted for unwitnessed fall. Placed on 1:1 CO for safety watch d/t restlessness, disorientation & attempting to climb OOB- at risk for falls. not agitated at this time.     On assessment, patient found sitting on the chair asleep however still restless, disoriented, confused & attempting to climb out of bed when woken up.  Plan: Continue 1:1 CO for now. Will re-evaluate tomorrow.          Continue Seroquel & Namenda.          Monitor QTc.          No indication for Haldol as patient is not agitated.          Psych following.    Kevin Nieto NP-C  Department of Medicine  Spectra: [phone]

## 2017-12-24 NOTE — CHART NOTE - NSCHARTNOTEFT_GEN_A_CORE
Interval events  Patient restless, continuos attempts to climb out of bed and chair with unsteady gait.  not agitated    Vital Signs Last 24 Hrs  T(C): 36.7 (23 Dec 2017 20:23), Max: 36.8 (23 Dec 2017 12:07)  T(F): 98 (23 Dec 2017 20:23), Max: 98.3 (23 Dec 2017 12:07)  HR: 108 (23 Dec 2017 20:23) (97 - 108)  BP: 138/85 (23 Dec 2017 20:23) (127/83 - 148/75)  BP(mean): --  RR: 18 (23 Dec 2017 20:23) (18 - 18)  SpO2: 98% (23 Dec 2017 20:23) (95% - 98%)    plan  Ativan if agitated per psych recommendation, not agitated now  Will avoid sedation as patient got sedated with haldol earlier  Constant observation for patient safety, will change to Enhanced observation once patient appears more calm.  Patient seen by psych today, will F/u with psych in am,   Will continue to monitor  Will update with primary team    Carolyn STEPHENS BC  Spectra# [phone].

## 2017-12-24 NOTE — PROGRESS NOTE ADULT - SUBJECTIVE AND OBJECTIVE BOX
MEDICINE, PROGRESS NOTE [phone]    EVELIN CHAVEZ 83y [id number]    Patient seen and examined.  Patient is a 83y old  Male who presents with a chief complaint of Loss of Consciousness with resultant fall (20 Dec 2017 11:35)  pt pleasantly confused on 1:1    PAST MEDICAL & SURGICAL HISTORY:  Alzheimer disease: with psuedobulbular affect  BPH (benign prostatic hypertrophy)  Renal calculi  Hyperlipidemia  GERD (gastroesophageal reflux disease)  Nephrolithiasis  H/O cataract extraction, right  S/P tonsillectomy    MEDICATIONS  (STANDING):  aspirin enteric coated 81 milliGRAM(s) Oral daily  cholecalciferol 1000 Unit(s) Oral two times a day  docusate sodium 100 milliGRAM(s) Oral three times a day  famotidine    Tablet 20 milliGRAM(s) Oral two times a day  heparin  Injectable 5000 Unit(s) SubCutaneous every 8 hours  influenza   Vaccine 0.5 milliLiter(s) IntraMuscular once  memantine 5 milliGRAM(s) Oral daily  memantine 10 milliGRAM(s) Oral at bedtime  midodrine 5 milliGRAM(s) Oral two times a day  multivitamin 1 Tablet(s) Oral daily  nystatin Powder 1 Application(s) Topical two times a day  polyethylene glycol 3350 17 Gram(s) Oral daily  QUEtiapine 25 milliGRAM(s) Oral at bedtime  sodium chloride 0.65% Nasal 1 Spray(s) Both Nostrils daily  tamsulosin 0.4 milliGRAM(s) Oral at bedtime    MEDICATIONS  (PRN):  acetaminophen   Tablet 650 milliGRAM(s) Oral every 6 hours PRN For Temp greater than 38 C (100.4 F)  ALBUTerol/ipratropium for Nebulization 3 milliLiter(s) Nebulizer every 6 hours PRN Shortness of Breath and/or Wheezing  aluminum hydroxide/magnesium hydroxide/simethicone Suspension 30 milliLiter(s) Oral every 6 hours PRN Dyspepsia  ondansetron Injectable 4 milliGRAM(s) IV Push every 6 hours PRN Nausea    Allergies    IV dye (Rash)  penicillin (Rash)    Intolerances        PHYSICAL EXAM:  Constitutional: NAD  HEENT: Normocephalic, EOMI  Neck:  No JVD  Respiratory: CTA B/L, No wheezes  Cardiovascular: S1, S2, RRR, + systolic murmur  Gastrointestinal: BS+, soft, NT/ND  Extremities: No peripheral edema  Neurological: AAOX1, no focal deficits  Psychiatric: Normal mood, normal affect  : No Barone    Vital Signs Last 24 Hrs  T(C): 36.6 (24 Dec 2017 13:20), Max: 36.7 (23 Dec 2017 20:23)  T(F): 97.9 (24 Dec 2017 13:20), Max: 98 (23 Dec 2017 20:23)  HR: 99 (24 Dec 2017 13:20) (99 - 108)  BP: 118/85 (24 Dec 2017 13:20) (118/85 - 138/85)  BP(mean): --  RR: 18 (24 Dec 2017 13:20) (18 - 18)  SpO2: 97% (24 Dec 2017 13:20) (97% - 98%)  I&O's Summary    23 Dec 2017 07:01  -  24 Dec 2017 07:00  --------------------------------------------------------  IN: 1900 mL / OUT: 1250 mL / NET: 650 mL    24 Dec 2017 07:01  -  24 Dec 2017 17:36  --------------------------------------------------------  IN: 960 mL / OUT: 400 mL / NET: 560 mL        LABS:    12-23    141  |  102  |  18  ----------------------------<  186<H>  4.4   |  24  |  1.08    Ca    9.4      23 Dec 2017 10:08

## 2017-12-25 RX ADMIN — MIDODRINE HYDROCHLORIDE 5 MILLIGRAM(S): 2.5 TABLET ORAL at 17:19

## 2017-12-25 RX ADMIN — Medication 30 MILLILITER(S): at 06:38

## 2017-12-25 RX ADMIN — HEPARIN SODIUM 5000 UNIT(S): 5000 INJECTION INTRAVENOUS; SUBCUTANEOUS at 21:41

## 2017-12-25 RX ADMIN — Medication 1000 UNIT(S): at 17:19

## 2017-12-25 RX ADMIN — QUETIAPINE FUMARATE 25 MILLIGRAM(S): 200 TABLET, FILM COATED ORAL at 21:41

## 2017-12-25 RX ADMIN — Medication 100 MILLIGRAM(S): at 06:38

## 2017-12-25 RX ADMIN — Medication 81 MILLIGRAM(S): at 11:50

## 2017-12-25 RX ADMIN — POLYETHYLENE GLYCOL 3350 17 GRAM(S): 17 POWDER, FOR SOLUTION ORAL at 11:51

## 2017-12-25 RX ADMIN — TAMSULOSIN HYDROCHLORIDE 0.4 MILLIGRAM(S): 0.4 CAPSULE ORAL at 21:41

## 2017-12-25 RX ADMIN — FAMOTIDINE 20 MILLIGRAM(S): 10 INJECTION INTRAVENOUS at 06:38

## 2017-12-25 RX ADMIN — MEMANTINE HYDROCHLORIDE 5 MILLIGRAM(S): 10 TABLET ORAL at 11:51

## 2017-12-25 RX ADMIN — HEPARIN SODIUM 5000 UNIT(S): 5000 INJECTION INTRAVENOUS; SUBCUTANEOUS at 06:38

## 2017-12-25 RX ADMIN — Medication 100 MILLIGRAM(S): at 21:41

## 2017-12-25 RX ADMIN — Medication 1000 UNIT(S): at 06:38

## 2017-12-25 RX ADMIN — Medication 30 MILLILITER(S): at 17:19

## 2017-12-25 RX ADMIN — Medication 1 TABLET(S): at 11:51

## 2017-12-25 RX ADMIN — MEMANTINE HYDROCHLORIDE 10 MILLIGRAM(S): 10 TABLET ORAL at 21:41

## 2017-12-25 RX ADMIN — Medication 1 SPRAY(S): at 11:50

## 2017-12-25 RX ADMIN — FAMOTIDINE 20 MILLIGRAM(S): 10 INJECTION INTRAVENOUS at 17:19

## 2017-12-25 RX ADMIN — MIDODRINE HYDROCHLORIDE 5 MILLIGRAM(S): 2.5 TABLET ORAL at 06:38

## 2017-12-25 NOTE — CHART NOTE - NSCHARTNOTEFT_GEN_A_CORE
Patient seen to evaluate for continuation of 1:1.   82 y/o M w/ pmhx of Alzheimer's Dementia, HLD, BPH, GI Bleed, nephrolithiasis, sinus bradycardia s/p loop recorder, multiple hospitalizations for falls now admitted for unwitnessed fall. Placed on 1:1 CO for safety watch d/t restlessness, confusion, disorientation & attempting to climb OOB- at risk for falls.     On assessment, patient found laying in bed asleep however still restless, disoriented, confused & attempts to climb out of bed immediately and fast once he's woken up. Review of 1:1 CO documentation reveals same. Will benefit from close monitoring especially with history of multiple falls.    Plan: Continue 1:1 CO for now. Will re-evaluate tomorrow.          Continue Seroquel & Namenda.          Monitor QTc.          No indication for Haldol or Ativan as patient is not agitated.          Psych following.    Kevin Nieto NP-C  Department of Medicine  Spectra: [phone] Patient seen to evaluate for continuation of 1:1.   82 y/o M w/ pmhx of Alzheimer's Dementia, HLD, BPH, GI Bleed, nephrolithiasis, sinus bradycardia s/p loop recorder, multiple hospitalizations for falls now admitted for unwitnessed fall. Placed on 1:1 CO for safety watch d/t restlessness, confusion, disorientation & attempting to climb OOB- at risk for falls.     On assessment, patient found laying in bed asleep however still restless, disoriented, confused & attempts to climb out of bed immediately and fast once he's woken up. Review of 1:1 CO documentation reveals same. Pulled out his IV lock earlier today. Will benefit from close monitoring especially with history of multiple falls.    Plan: Continue 1:1 CO for now. Will re-evaluate tomorrow.          Continue Seroquel & Namenda.          Monitor QTc.          No indication for Haldol or Ativan as patient is not agitated.          Psych following.    Kevin Nieto NP-C  Department of Medicine  Spectra: [phone]

## 2017-12-25 NOTE — PROGRESS NOTE ADULT - SUBJECTIVE AND OBJECTIVE BOX
MEDICINE, PROGRESS NOTE [phone]    EVELIN CHAVEZ 83y [id number]    Patient seen and examined.  Patient is a 83y old  Male who presents with a chief complaint of Loss of Consciousness with resultant fall (20 Dec 2017 11:35)  Pt pleasantly confused.    PAST MEDICAL & SURGICAL HISTORY:  Alzheimer disease: with psuedobulbular affect  BPH (benign prostatic hypertrophy)  Renal calculi  Hyperlipidemia  GERD (gastroesophageal reflux disease)  Nephrolithiasis  H/O cataract extraction, right  S/P tonsillectomy    MEDICATIONS  (STANDING):  aspirin enteric coated 81 milliGRAM(s) Oral daily  cholecalciferol 1000 Unit(s) Oral two times a day  docusate sodium 100 milliGRAM(s) Oral three times a day  famotidine    Tablet 20 milliGRAM(s) Oral two times a day  heparin  Injectable 5000 Unit(s) SubCutaneous every 8 hours  influenza   Vaccine 0.5 milliLiter(s) IntraMuscular once  memantine 5 milliGRAM(s) Oral daily  memantine 10 milliGRAM(s) Oral at bedtime  midodrine 5 milliGRAM(s) Oral two times a day  multivitamin 1 Tablet(s) Oral daily  nystatin Powder 1 Application(s) Topical two times a day  polyethylene glycol 3350 17 Gram(s) Oral daily  QUEtiapine 25 milliGRAM(s) Oral at bedtime  sodium chloride 0.65% Nasal 1 Spray(s) Both Nostrils daily  tamsulosin 0.4 milliGRAM(s) Oral at bedtime    MEDICATIONS  (PRN):  acetaminophen   Tablet 650 milliGRAM(s) Oral every 6 hours PRN For Temp greater than 38 C (100.4 F)  ALBUTerol/ipratropium for Nebulization 3 milliLiter(s) Nebulizer every 6 hours PRN Shortness of Breath and/or Wheezing  aluminum hydroxide/magnesium hydroxide/simethicone Suspension 30 milliLiter(s) Oral every 6 hours PRN Dyspepsia  ondansetron Injectable 4 milliGRAM(s) IV Push every 6 hours PRN Nausea    Allergies    IV dye (Rash)  penicillin (Rash)    Intolerances        PHYSICAL EXAM:  Constitutional: NAD  HEENT: Normocephalic, EOMI  Neck:  No JVD  Respiratory: CTA B/L, No wheezes  Cardiovascular: S1, S2, RRR, + systolic murmur  Gastrointestinal: BS+, soft, NT/ND  Extremities: No peripheral edema  Neurological: AAOX1, no focal deficits  Psychiatric: Normal mood, normal affect  : No Barone    Vital Signs Last 24 Hrs  T(C): 36.7 (25 Dec 2017 14:38), Max: 36.8 (24 Dec 2017 20:02)  T(F): 98 (25 Dec 2017 14:38), Max: 98.3 (24 Dec 2017 20:02)  HR: 94 (25 Dec 2017 14:38) (94 - 118)  BP: 107/70 (25 Dec 2017 14:38) (107/70 - 135/75)  BP(mean): --  RR: 18 (25 Dec 2017 14:38) (17 - 19)  SpO2: 97% (25 Dec 2017 14:38) (97% - 97%)  I&O's Summary    24 Dec 2017 07:01  -  25 Dec 2017 07:00  --------------------------------------------------------  IN: 1440 mL / OUT: 600 mL / NET: 840 mL
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
## 2017-12-25 NOTE — PROGRESS NOTE ADULT - SUBJECTIVE AND OBJECTIVE BOX
- Patient seen and examined.  - In summary, patient is a 83y year old man who presented with Loss of Consciousness with resultant fall (20 Dec 2017 11:35)  - Today, patient is without complaints.         *****MEDICATIONS:    MEDICATIONS  (STANDING):  aspirin enteric coated 81 milliGRAM(s) Oral daily  cholecalciferol 1000 Unit(s) Oral two times a day  docusate sodium 100 milliGRAM(s) Oral three times a day  famotidine    Tablet 20 milliGRAM(s) Oral two times a day  heparin  Injectable 5000 Unit(s) SubCutaneous every 8 hours  influenza   Vaccine 0.5 milliLiter(s) IntraMuscular once  memantine 5 milliGRAM(s) Oral daily  memantine 10 milliGRAM(s) Oral at bedtime  midodrine 5 milliGRAM(s) Oral two times a day  multivitamin 1 Tablet(s) Oral daily  nystatin Powder 1 Application(s) Topical two times a day  polyethylene glycol 3350 17 Gram(s) Oral daily  QUEtiapine 25 milliGRAM(s) Oral at bedtime  sodium chloride 0.65% Nasal 1 Spray(s) Both Nostrils daily  tamsulosin 0.4 milliGRAM(s) Oral at bedtime    MEDICATIONS  (PRN):  acetaminophen   Tablet 650 milliGRAM(s) Oral every 6 hours PRN For Temp greater than 38 C (100.4 F)  ALBUTerol/ipratropium for Nebulization 3 milliLiter(s) Nebulizer every 6 hours PRN Shortness of Breath and/or Wheezing  aluminum hydroxide/magnesium hydroxide/simethicone Suspension 30 milliLiter(s) Oral every 6 hours PRN Dyspepsia  ondansetron Injectable 4 milliGRAM(s) IV Push every 6 hours PRN Nausea             ***** REVIEW OF SYSTEM:  GEN: no fever, no chills, no pain  RESP: no SOB, no cough, no sputum  CVS: no chest pain, no palpitations, no edema  GI: no abdominal pain, no nausea, no vomiting, no constipation, no diarrhea  : no dysurea, no frequency  NEURO: no headache, no diziness  PSYCH: no depression, not anxious  Derm : no itching, no rash         ***** VITAL SIGNS:    T(F): 98.1 (12-25-17 @ 06:06), Max: 98.3 (12-24-17 @ 20:02)  HR: 98 (12-25-17 @ 06:06) (98 - 118)  BP: 119/74 (12-25-17 @ 06:06) (115/79 - 135/75)  RR: 19 (12-25-17 @ 06:06) (17 - 19)  SpO2: 97% (12-25-17 @ 06:06) (97% - 97%)  Wt(kg): --  ,   I&O's Summary    24 Dec 2017 07:01  -  25 Dec 2017 07:00  --------------------------------------------------------  IN: 1440 mL / OUT: 600 mL / NET: 840 mL                   *****PHYSICAL EXAM:  GEN: A&O X 3 , NAD , comfortable  HEENT: NCAT, EOMI, MMM, no icterus  NECK: Supple, No JVD  CVS: S1S2 , regular , No M/R/G appreciated  PULM: CTA B/L,  no W/R/R appreciated  ABD.: soft. non tender, non distended,  bowel sounds present  Extrem: intact pulses , no edema noted  Derm: No rash or ecchymosis noted  PSYCH: normal mood, no depression, not anxious         *****LAB AND IMAGING:                     [All pertinent recent Imaging/Reports reviewed]  < from: Transthoracic Echocardiogram (12.21.17 @ 15:00) >  Conclusions:  1. Normal left ventricular internal dimensions and wall  thicknesses.  2. Normal left ventricular systolic function. No segmental  wall motion abnormalities.  3. Normal diastolic function  4. Normal right ventricular size and function.  *** Compared with echocardiogram of 6/27/2017, no  significant changes noted.    < end of copied text >         *****A S S E S S M E N T   A N D   P L A N :  83M viral URI, fall vs syncope  pt baseline dementia, unable to give details of episode  ILR with brief episodes of bradycardia as well as PAT/SVT  echo 6/17, no need to repeat  supportive tx for URI  PT  VSS    __________________________  MARY Dunlap D.O.

## 2017-12-26 PROCEDURE — 73562 X-RAY EXAM OF KNEE 3: CPT | Mod: 26,50

## 2017-12-26 RX ADMIN — MIDODRINE HYDROCHLORIDE 5 MILLIGRAM(S): 2.5 TABLET ORAL at 05:54

## 2017-12-26 RX ADMIN — MEMANTINE HYDROCHLORIDE 10 MILLIGRAM(S): 10 TABLET ORAL at 21:16

## 2017-12-26 RX ADMIN — TAMSULOSIN HYDROCHLORIDE 0.4 MILLIGRAM(S): 0.4 CAPSULE ORAL at 21:16

## 2017-12-26 RX ADMIN — MEMANTINE HYDROCHLORIDE 5 MILLIGRAM(S): 10 TABLET ORAL at 11:09

## 2017-12-26 RX ADMIN — Medication 100 MILLIGRAM(S): at 14:54

## 2017-12-26 RX ADMIN — HEPARIN SODIUM 5000 UNIT(S): 5000 INJECTION INTRAVENOUS; SUBCUTANEOUS at 14:54

## 2017-12-26 RX ADMIN — Medication 100 MILLIGRAM(S): at 21:16

## 2017-12-26 RX ADMIN — Medication 1 SPRAY(S): at 11:09

## 2017-12-26 RX ADMIN — Medication 1000 UNIT(S): at 18:25

## 2017-12-26 RX ADMIN — Medication 1000 UNIT(S): at 05:54

## 2017-12-26 RX ADMIN — HEPARIN SODIUM 5000 UNIT(S): 5000 INJECTION INTRAVENOUS; SUBCUTANEOUS at 21:16

## 2017-12-26 RX ADMIN — NYSTATIN CREAM 1 APPLICATION(S): 100000 CREAM TOPICAL at 18:25

## 2017-12-26 RX ADMIN — Medication 1 TABLET(S): at 11:09

## 2017-12-26 RX ADMIN — Medication 81 MILLIGRAM(S): at 11:09

## 2017-12-26 RX ADMIN — Medication 100 MILLIGRAM(S): at 05:54

## 2017-12-26 RX ADMIN — FAMOTIDINE 20 MILLIGRAM(S): 10 INJECTION INTRAVENOUS at 05:54

## 2017-12-26 RX ADMIN — MIDODRINE HYDROCHLORIDE 5 MILLIGRAM(S): 2.5 TABLET ORAL at 18:25

## 2017-12-26 RX ADMIN — FAMOTIDINE 20 MILLIGRAM(S): 10 INJECTION INTRAVENOUS at 18:25

## 2017-12-26 RX ADMIN — POLYETHYLENE GLYCOL 3350 17 GRAM(S): 17 POWDER, FOR SOLUTION ORAL at 11:09

## 2017-12-26 RX ADMIN — HEPARIN SODIUM 5000 UNIT(S): 5000 INJECTION INTRAVENOUS; SUBCUTANEOUS at 05:54

## 2017-12-26 RX ADMIN — NYSTATIN CREAM 1 APPLICATION(S): 100000 CREAM TOPICAL at 05:54

## 2017-12-26 RX ADMIN — QUETIAPINE FUMARATE 25 MILLIGRAM(S): 200 TABLET, FILM COATED ORAL at 21:16

## 2017-12-26 NOTE — PROGRESS NOTE ADULT - SUBJECTIVE AND OBJECTIVE BOX
- Patient seen and examined.  - In summary, patient is a 83y year old man who presented with Loss of Consciousness with resultant fall (20 Dec 2017 11:35)  - Today, patient is without complaints.         *****MEDICATIONS:    MEDICATIONS  (STANDING):  aspirin enteric coated 81 milliGRAM(s) Oral daily  cholecalciferol 1000 Unit(s) Oral two times a day  docusate sodium 100 milliGRAM(s) Oral three times a day  famotidine    Tablet 20 milliGRAM(s) Oral two times a day  heparin  Injectable 5000 Unit(s) SubCutaneous every 8 hours  influenza   Vaccine 0.5 milliLiter(s) IntraMuscular once  memantine 5 milliGRAM(s) Oral daily  memantine 10 milliGRAM(s) Oral at bedtime  midodrine 5 milliGRAM(s) Oral two times a day  multivitamin 1 Tablet(s) Oral daily  nystatin Powder 1 Application(s) Topical two times a day  polyethylene glycol 3350 17 Gram(s) Oral daily  QUEtiapine 25 milliGRAM(s) Oral at bedtime  sodium chloride 0.65% Nasal 1 Spray(s) Both Nostrils daily  tamsulosin 0.4 milliGRAM(s) Oral at bedtime    MEDICATIONS  (PRN):  acetaminophen   Tablet 650 milliGRAM(s) Oral every 6 hours PRN For Temp greater than 38 C (100.4 F)  ALBUTerol/ipratropium for Nebulization 3 milliLiter(s) Nebulizer every 6 hours PRN Shortness of Breath and/or Wheezing  aluminum hydroxide/magnesium hydroxide/simethicone Suspension 30 milliLiter(s) Oral every 6 hours PRN Dyspepsia  ondansetron Injectable 4 milliGRAM(s) IV Push every 6 hours PRN Nausea             ***** REVIEW OF SYSTEM:  GEN: no fever, no chills, no pain  RESP: no SOB, no cough, no sputum  CVS: no chest pain, no palpitations, no edema  GI: no abdominal pain, no nausea, no vomiting, no constipation, no diarrhea  : no dysurea, no frequency  NEURO: no headache, no diziness  PSYCH: no depression, not anxious  Derm : no itching, no rash         ***** VITAL SIGNS:    T(F): 97.5 (12-26-17 @ 04:49), Max: 98 (12-25-17 @ 14:38)  HR: 94 (12-26-17 @ 04:49) (94 - 98)  BP: 122/79 (12-26-17 @ 04:49) (107/70 - 128/85)  RR: 19 (12-26-17 @ 04:49) (18 - 19)  SpO2: 96% (12-26-17 @ 04:49) (96% - 97%)  Wt(kg): --  ,   I&O's Summary    25 Dec 2017 07:01  -  26 Dec 2017 07:00  --------------------------------------------------------  IN: 240 mL / OUT: 0 mL / NET: 240 mL                   *****PHYSICAL EXAM:  GEN: A&O X 3 , NAD , comfortable  HEENT: NCAT, EOMI, MMM, no icterus  NECK: Supple, No JVD  CVS: S1S2 , regular , No M/R/G appreciated  PULM: CTA B/L,  no W/R/R appreciated  ABD.: soft. non tender, non distended,  bowel sounds present  Extrem: intact pulses , no edema noted  Derm: No rash or ecchymosis noted  PSYCH: normal mood, no depression, not anxious         *****LAB AND IMAGING:                     [All pertinent recent Imaging/Reports reviewed]  < from: Transthoracic Echocardiogram (12.21.17 @ 15:00) >  Conclusions:  1. Normal left ventricular internal dimensions and wall  thicknesses.  2. Normal left ventricular systolic function. No segmental  wall motion abnormalities.  3. Normal diastolic function  4. Normal right ventricular size and function.  *** Compared with echocardiogram of 6/27/2017, no  significant changes noted.    < end of copied text >         *****A S S E S S M E N T   A N D   P L A N :  83M viral URI, fall vs syncope  pt baseline dementia, unable to give details of episode  ILR with brief episodes of bradycardia as well as PAT/SVT  no new events on tele  echo 6/17, no need to repeat  supportive tx for URI  PT  VSS  DCP    __________________________  MARY Dunlap D.O.
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
## 2017-12-27 RX ORDER — HALOPERIDOL DECANOATE 100 MG/ML
1 INJECTION INTRAMUSCULAR ONCE
Qty: 0 | Refills: 0 | Status: COMPLETED | OUTPATIENT
Start: 2017-12-27 | End: 2017-12-27

## 2017-12-27 RX ADMIN — HALOPERIDOL DECANOATE 1 MILLIGRAM(S): 100 INJECTION INTRAMUSCULAR at 04:51

## 2017-12-27 RX ADMIN — Medication 100 MILLIGRAM(S): at 21:29

## 2017-12-27 RX ADMIN — Medication 100 MILLIGRAM(S): at 06:39

## 2017-12-27 RX ADMIN — FAMOTIDINE 20 MILLIGRAM(S): 10 INJECTION INTRAVENOUS at 06:39

## 2017-12-27 RX ADMIN — NYSTATIN CREAM 1 APPLICATION(S): 100000 CREAM TOPICAL at 06:39

## 2017-12-27 RX ADMIN — MEMANTINE HYDROCHLORIDE 10 MILLIGRAM(S): 10 TABLET ORAL at 21:25

## 2017-12-27 RX ADMIN — HALOPERIDOL DECANOATE 1 MILLIGRAM(S): 100 INJECTION INTRAMUSCULAR at 20:46

## 2017-12-27 RX ADMIN — Medication 0.25 MILLIGRAM(S): at 01:33

## 2017-12-27 RX ADMIN — HEPARIN SODIUM 5000 UNIT(S): 5000 INJECTION INTRAVENOUS; SUBCUTANEOUS at 06:39

## 2017-12-27 RX ADMIN — HALOPERIDOL DECANOATE 1 MILLIGRAM(S): 100 INJECTION INTRAMUSCULAR at 12:23

## 2017-12-27 RX ADMIN — HALOPERIDOL DECANOATE 1 MILLIGRAM(S): 100 INJECTION INTRAMUSCULAR at 13:45

## 2017-12-27 RX ADMIN — TAMSULOSIN HYDROCHLORIDE 0.4 MILLIGRAM(S): 0.4 CAPSULE ORAL at 21:25

## 2017-12-27 RX ADMIN — Medication 650 MILLIGRAM(S): at 23:26

## 2017-12-27 RX ADMIN — MIDODRINE HYDROCHLORIDE 5 MILLIGRAM(S): 2.5 TABLET ORAL at 06:39

## 2017-12-27 RX ADMIN — HEPARIN SODIUM 5000 UNIT(S): 5000 INJECTION INTRAVENOUS; SUBCUTANEOUS at 21:29

## 2017-12-27 RX ADMIN — Medication 1000 UNIT(S): at 06:39

## 2017-12-27 RX ADMIN — QUETIAPINE FUMARATE 25 MILLIGRAM(S): 200 TABLET, FILM COATED ORAL at 21:25

## 2017-12-27 NOTE — PROGRESS NOTE ADULT - SUBJECTIVE AND OBJECTIVE BOX
MEDICINE, PROGRESS NOTE [phone]    EVELIN CHAVEZ 83y [id number]    Patient seen and examined.  Patient is a 83y old  Male who presents with a chief complaint of Loss of Consciousness with resultant fall (20 Dec 2017 11:35)  pt pleasantly confused.    PAST MEDICAL & SURGICAL HISTORY:  Alzheimer disease: with psuedobulbular affect  BPH (benign prostatic hypertrophy)  Renal calculi  Hyperlipidemia  GERD (gastroesophageal reflux disease)  Nephrolithiasis  H/O cataract extraction, right  S/P tonsillectomy    MEDICATIONS  (STANDING):  aspirin enteric coated 81 milliGRAM(s) Oral daily  cholecalciferol 1000 Unit(s) Oral two times a day  docusate sodium 100 milliGRAM(s) Oral three times a day  famotidine    Tablet 20 milliGRAM(s) Oral two times a day  haloperidol    Injectable 1 milliGRAM(s) IV Push once  heparin  Injectable 5000 Unit(s) SubCutaneous every 8 hours  influenza   Vaccine 0.5 milliLiter(s) IntraMuscular once  memantine 5 milliGRAM(s) Oral daily  memantine 10 milliGRAM(s) Oral at bedtime  midodrine 5 milliGRAM(s) Oral two times a day  multivitamin 1 Tablet(s) Oral daily  nystatin Powder 1 Application(s) Topical two times a day  polyethylene glycol 3350 17 Gram(s) Oral daily  QUEtiapine 25 milliGRAM(s) Oral at bedtime  sodium chloride 0.65% Nasal 1 Spray(s) Both Nostrils daily  tamsulosin 0.4 milliGRAM(s) Oral at bedtime    MEDICATIONS  (PRN):  acetaminophen   Tablet 650 milliGRAM(s) Oral every 6 hours PRN For Temp greater than 38 C (100.4 F)  ALBUTerol/ipratropium for Nebulization 3 milliLiter(s) Nebulizer every 6 hours PRN Shortness of Breath and/or Wheezing  aluminum hydroxide/magnesium hydroxide/simethicone Suspension 30 milliLiter(s) Oral every 6 hours PRN Dyspepsia  ondansetron Injectable 4 milliGRAM(s) IV Push every 6 hours PRN Nausea    Allergies    IV dye (Rash)  penicillin (Rash)    Intolerances        PHYSICAL EXAM:  Constitutional: NAD  HEENT: Normocephalic, EOMI  Neck:  No JVD  Respiratory: CTA B/L, No wheezes  Cardiovascular: S1, S2, RRR, + systolic murmur  Gastrointestinal: BS+, soft, NT/ND  Extremities: No peripheral edema  Neurological: AAOX1, no focal deficits  Psychiatric: flat affect  : No Barone    Vital Signs Last 24 Hrs  T(C): 36.6 (27 Dec 2017 14:28), Max: 36.6 (27 Dec 2017 14:28)  T(F): 97.8 (27 Dec 2017 14:28), Max: 97.8 (27 Dec 2017 14:28)  HR: 101 (27 Dec 2017 14:28) (97 - 103)  BP: 95/59 (27 Dec 2017 14:28) (95/59 - 130/84)  BP(mean): --  RR: 18 (27 Dec 2017 14:28) (18 - 18)  SpO2: 95% (27 Dec 2017 14:28) (95% - 97%)  I&O's Summary    26 Dec 2017 07:01  -  27 Dec 2017 07:00  --------------------------------------------------------  IN: 740 mL / OUT: 775 mL / NET: -35 mL

## 2017-12-27 NOTE — PROVIDER CONTACT NOTE (OTHER) - SITUATION
pt in enhanced supervision room. pt becoming aggressive & combative towards staff. pt climbing out of bed, & yelling. pt not following commands and cannot be consoled

## 2017-12-27 NOTE — PROGRESS NOTE ADULT - SUBJECTIVE AND OBJECTIVE BOX
- Patient seen and examined.  - In summary, patient is a 83y year old man who presented with Loss of Consciousness with resultant fall (20 Dec 2017 11:35)  - Today, patient is without complaints.         *****MEDICATIONS:    MEDICATIONS  (STANDING):  aspirin enteric coated 81 milliGRAM(s) Oral daily  cholecalciferol 1000 Unit(s) Oral two times a day  docusate sodium 100 milliGRAM(s) Oral three times a day  famotidine    Tablet 20 milliGRAM(s) Oral two times a day  haloperidol    Injectable 1 milliGRAM(s) IV Push once  heparin  Injectable 5000 Unit(s) SubCutaneous every 8 hours  influenza   Vaccine 0.5 milliLiter(s) IntraMuscular once  memantine 5 milliGRAM(s) Oral daily  memantine 10 milliGRAM(s) Oral at bedtime  midodrine 5 milliGRAM(s) Oral two times a day  multivitamin 1 Tablet(s) Oral daily  nystatin Powder 1 Application(s) Topical two times a day  polyethylene glycol 3350 17 Gram(s) Oral daily  QUEtiapine 25 milliGRAM(s) Oral at bedtime  sodium chloride 0.65% Nasal 1 Spray(s) Both Nostrils daily  tamsulosin 0.4 milliGRAM(s) Oral at bedtime    MEDICATIONS  (PRN):  acetaminophen   Tablet 650 milliGRAM(s) Oral every 6 hours PRN For Temp greater than 38 C (100.4 F)  ALBUTerol/ipratropium for Nebulization 3 milliLiter(s) Nebulizer every 6 hours PRN Shortness of Breath and/or Wheezing  aluminum hydroxide/magnesium hydroxide/simethicone Suspension 30 milliLiter(s) Oral every 6 hours PRN Dyspepsia  ondansetron Injectable 4 milliGRAM(s) IV Push every 6 hours PRN Nausea             ***** REVIEW OF SYSTEM:  GEN: no fever, no chills, no pain  RESP: no SOB, no cough, no sputum  CVS: no chest pain, no palpitations, no edema  GI: no abdominal pain, no nausea, no vomiting, no constipation, no diarrhea  : no dysurea, no frequency  NEURO: no headache, no diziness  PSYCH: no depression, not anxious  Derm : no itching, no rash         ***** VITAL SIGNS:    T(F): 97.4 (12-27-17 @ 04:38), Max: 97.5 (12-26-17 @ 22:33)  HR: 103 (12-27-17 @ 04:38) (93 - 103)  BP: 130/84 (12-27-17 @ 04:38) (115/73 - 130/84)  RR: 18 (12-27-17 @ 04:38) (18 - 18)  SpO2: 95% (12-27-17 @ 04:38) (95% - 97%)  Wt(kg): --  ,   I&O's Summary    26 Dec 2017 07:01  -  27 Dec 2017 07:00  --------------------------------------------------------  IN: 740 mL / OUT: 775 mL / NET: -35 mL                 *****PHYSICAL EXAM:  GEN: A&O X 3 , NAD , comfortable  HEENT: NCAT, EOMI, MMM, no icterus  NECK: Supple, No JVD  CVS: S1S2 , regular , No M/R/G appreciated  PULM: CTA B/L,  no W/R/R appreciated  ABD.: soft. non tender, non distended,  bowel sounds present  Extrem: intact pulses , no edema noted  Derm: No rash or ecchymosis noted  PSYCH: normal mood, no depression, not anxious         *****LAB AND IMAGING:                     [All pertinent recent Imaging/Reports reviewed]  < from: Transthoracic Echocardiogram (12.21.17 @ 15:00) >  Conclusions:  1. Normal left ventricular internal dimensions and wall  thicknesses.  2. Normal left ventricular systolic function. No segmental  wall motion abnormalities.  3. Normal diastolic function  4. Normal right ventricular size and function.  *** Compared with echocardiogram of 6/27/2017, no  significant changes noted.    < end of copied text >         *****A S S E S S M E N T   A N D   P L A N :  83M viral URI, fall vs syncope  pt baseline dementia, unable to give details of episode  ILR with brief episodes of bradycardia as well as PAT/SVT  no new events on tele  echo 6/17, no need to repeat  supportive tx for URI  VSS  DCP    __________________________  MARY Dunlap D.O.

## 2017-12-27 NOTE — PROVIDER CONTACT NOTE (OTHER) - ASSESSMENT
pt transferred from [facility] for enhanced supervision & was on 1:1 on that unit. pt received haldol & ativan over night. pt becoming aggressive & combative. pt at risk to harm self & others

## 2017-12-28 PROCEDURE — 93010 ELECTROCARDIOGRAM REPORT: CPT

## 2017-12-28 RX ADMIN — Medication 81 MILLIGRAM(S): at 12:11

## 2017-12-28 RX ADMIN — Medication 1000 UNIT(S): at 06:29

## 2017-12-28 RX ADMIN — Medication 100 MILLIGRAM(S): at 12:11

## 2017-12-28 RX ADMIN — NYSTATIN CREAM 1 APPLICATION(S): 100000 CREAM TOPICAL at 17:54

## 2017-12-28 RX ADMIN — FAMOTIDINE 20 MILLIGRAM(S): 10 INJECTION INTRAVENOUS at 06:30

## 2017-12-28 RX ADMIN — QUETIAPINE FUMARATE 25 MILLIGRAM(S): 200 TABLET, FILM COATED ORAL at 21:05

## 2017-12-28 RX ADMIN — HEPARIN SODIUM 5000 UNIT(S): 5000 INJECTION INTRAVENOUS; SUBCUTANEOUS at 14:02

## 2017-12-28 RX ADMIN — HEPARIN SODIUM 5000 UNIT(S): 5000 INJECTION INTRAVENOUS; SUBCUTANEOUS at 21:05

## 2017-12-28 RX ADMIN — Medication 100 MILLIGRAM(S): at 21:05

## 2017-12-28 RX ADMIN — MIDODRINE HYDROCHLORIDE 5 MILLIGRAM(S): 2.5 TABLET ORAL at 17:54

## 2017-12-28 RX ADMIN — NYSTATIN CREAM 1 APPLICATION(S): 100000 CREAM TOPICAL at 06:30

## 2017-12-28 RX ADMIN — Medication 1000 UNIT(S): at 17:54

## 2017-12-28 RX ADMIN — TAMSULOSIN HYDROCHLORIDE 0.4 MILLIGRAM(S): 0.4 CAPSULE ORAL at 21:05

## 2017-12-28 RX ADMIN — MEMANTINE HYDROCHLORIDE 5 MILLIGRAM(S): 10 TABLET ORAL at 12:11

## 2017-12-28 RX ADMIN — Medication 1 TABLET(S): at 12:11

## 2017-12-28 RX ADMIN — FAMOTIDINE 20 MILLIGRAM(S): 10 INJECTION INTRAVENOUS at 17:54

## 2017-12-28 RX ADMIN — HEPARIN SODIUM 5000 UNIT(S): 5000 INJECTION INTRAVENOUS; SUBCUTANEOUS at 06:30

## 2017-12-28 RX ADMIN — Medication 1 SPRAY(S): at 12:12

## 2017-12-28 RX ADMIN — Medication 100 MILLIGRAM(S): at 06:29

## 2017-12-28 RX ADMIN — MEMANTINE HYDROCHLORIDE 10 MILLIGRAM(S): 10 TABLET ORAL at 21:05

## 2017-12-28 RX ADMIN — MIDODRINE HYDROCHLORIDE 5 MILLIGRAM(S): 2.5 TABLET ORAL at 06:30
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
## 2017-12-28 NOTE — DIETITIAN INITIAL EVALUATION ADULT. - NS AS NUTRI INTERV FEED ASSISTANCE
Feeding Assistance/Other (specify)/1. Provide food preferences as requested by Pt/family within diet restrictions  2. Encourage PO intake during meal times 3. Provide feeding assistance as needed. 4. RD remains available to monitor PO intake, wt, labs.

## 2017-12-28 NOTE — DIETITIAN INITIAL EVALUATION ADULT. - OTHER INFO
Pt seen for LOS. Pt seen sitting in chair, reports a good PO intake. Pt states he likes cereal but offers no other food preferences at this time. Pt unable to recall weight history, however noted Pt was 153.8lbs on 11/2017 admission. Pt was now admitted at 171.2lbs and current wt is 159.8lbs. ? Wt fluctuations vs accuracy of weights obtained. Will continue to monitor. Pt presently consuming % of meals. Pt denies chewing/swallowing difficulty. pt denies GI distress, Pt denies micronutrient supplementation. NKFA

## 2017-12-28 NOTE — DIETITIAN INITIAL EVALUATION ADULT. - ENERGY NEEDS
Ht: 5'3", Wt: 159.8lbs, BMI: 28.3kg/m2, IBW: 124lbs(+/-10%), 128%IBW  Pertinent information: Pt admitted for s/p fall. Pt found with right 9th Rx non displaced Fx with no need for surgical intervention. Pt with viral URI and alzheimer dementia,   +2 yamila leg Edema, Skin intact

## 2017-12-28 NOTE — PROGRESS NOTE ADULT - SUBJECTIVE AND OBJECTIVE BOX
MEDICINE, PROGRESS NOTE [phone]    EVELIN CHAVEZ 83y [id number]    Patient seen and examined.  Patient is a 83y old  Male who presents with a chief complaint of Loss of Consciousness with resultant fall (20 Dec 2017 11:35)  Pt has no current complaints. Pleasantly confused.    PAST MEDICAL & SURGICAL HISTORY:  Alzheimer disease: with psuedobulbular affect  BPH (benign prostatic hypertrophy)  Renal calculi  Hyperlipidemia  GERD (gastroesophageal reflux disease)  Nephrolithiasis  H/O cataract extraction, right  S/P tonsillectomy    MEDICATIONS  (STANDING):  aspirin enteric coated 81 milliGRAM(s) Oral daily  cholecalciferol 1000 Unit(s) Oral two times a day  docusate sodium 100 milliGRAM(s) Oral three times a day  famotidine    Tablet 20 milliGRAM(s) Oral two times a day  heparin  Injectable 5000 Unit(s) SubCutaneous every 8 hours  influenza   Vaccine 0.5 milliLiter(s) IntraMuscular once  memantine 5 milliGRAM(s) Oral daily  memantine 10 milliGRAM(s) Oral at bedtime  midodrine 5 milliGRAM(s) Oral two times a day  multivitamin 1 Tablet(s) Oral daily  nystatin Powder 1 Application(s) Topical two times a day  polyethylene glycol 3350 17 Gram(s) Oral daily  QUEtiapine 25 milliGRAM(s) Oral at bedtime  sodium chloride 0.65% Nasal 1 Spray(s) Both Nostrils daily  tamsulosin 0.4 milliGRAM(s) Oral at bedtime    MEDICATIONS  (PRN):  acetaminophen   Tablet 650 milliGRAM(s) Oral every 6 hours PRN For Temp greater than 38 C (100.4 F)  ALBUTerol/ipratropium for Nebulization 3 milliLiter(s) Nebulizer every 6 hours PRN Shortness of Breath and/or Wheezing  aluminum hydroxide/magnesium hydroxide/simethicone Suspension 30 milliLiter(s) Oral every 6 hours PRN Dyspepsia  ondansetron Injectable 4 milliGRAM(s) IV Push every 6 hours PRN Nausea    Allergies    IV dye (Rash)  penicillin (Rash)    Intolerances        PHYSICAL EXAM:  Constitutional: NAD  HEENT: Normocephalic, EOMI  Neck:  No JVD  Respiratory: CTA B/L, No wheezes  Cardiovascular: S1, S2, RRR, + systolic murmur  Gastrointestinal: BS+, soft, NT/ND  Extremities: No peripheral edema  Neurological: AAOX1, no focal deficits  Psychiatric: Normal mood, normal affect  : No Barone    Vital Signs Last 24 Hrs  T(C): 36.5 (28 Dec 2017 13:03), Max: 36.6 (27 Dec 2017 21:00)  T(F): 97.7 (28 Dec 2017 13:03), Max: 97.9 (27 Dec 2017 21:00)  HR: 113 (28 Dec 2017 13:03) (96 - 116)  BP: 125/74 (28 Dec 2017 13:03) (125/74 - 143/83)  BP(mean): --  RR: 18 (28 Dec 2017 13:03) (18 - 18)  SpO2: 95% (28 Dec 2017 13:03) (94% - 95%)  I&O's Summary    27 Dec 2017 07:01  -  28 Dec 2017 07:00  --------------------------------------------------------  IN: 560 mL / OUT: 400 mL / NET: 160 mL    28 Dec 2017 07:01  -  28 Dec 2017 20:20  --------------------------------------------------------  IN: 720 mL / OUT: 100 mL / NET: 620 mL

## 2017-12-28 NOTE — PROGRESS NOTE ADULT - SUBJECTIVE AND OBJECTIVE BOX
- Patient seen and examined.  - In summary, patient is a 83y year old man who presented with Loss of Consciousness with resultant fall (20 Dec 2017 11:35)  - Today, patient is without complaints.         *****MEDICATIONS:    MEDICATIONS  (STANDING):  aspirin enteric coated 81 milliGRAM(s) Oral daily  cholecalciferol 1000 Unit(s) Oral two times a day  docusate sodium 100 milliGRAM(s) Oral three times a day  famotidine    Tablet 20 milliGRAM(s) Oral two times a day  heparin  Injectable 5000 Unit(s) SubCutaneous every 8 hours  influenza   Vaccine 0.5 milliLiter(s) IntraMuscular once  memantine 5 milliGRAM(s) Oral daily  memantine 10 milliGRAM(s) Oral at bedtime  midodrine 5 milliGRAM(s) Oral two times a day  multivitamin 1 Tablet(s) Oral daily  nystatin Powder 1 Application(s) Topical two times a day  polyethylene glycol 3350 17 Gram(s) Oral daily  QUEtiapine 25 milliGRAM(s) Oral at bedtime  sodium chloride 0.65% Nasal 1 Spray(s) Both Nostrils daily  tamsulosin 0.4 milliGRAM(s) Oral at bedtime    MEDICATIONS  (PRN):  acetaminophen   Tablet 650 milliGRAM(s) Oral every 6 hours PRN For Temp greater than 38 C (100.4 F)  ALBUTerol/ipratropium for Nebulization 3 milliLiter(s) Nebulizer every 6 hours PRN Shortness of Breath and/or Wheezing  aluminum hydroxide/magnesium hydroxide/simethicone Suspension 30 milliLiter(s) Oral every 6 hours PRN Dyspepsia  ondansetron Injectable 4 milliGRAM(s) IV Push every 6 hours PRN Nausea               ***** REVIEW OF SYSTEM:  GEN: no fever, no chills, no pain  RESP: no SOB, no cough, no sputum  CVS: no chest pain, no palpitations, no edema  GI: no abdominal pain, no nausea, no vomiting, no constipation, no diarrhea  : no dysurea, no frequency  NEURO: no headache, no diziness  PSYCH: no depression, not anxious  Derm : no itching, no rash         ***** VITAL SIGNS:    T(F): 97.5 (12-28-17 @ 04:15), Max: 97.9 (12-27-17 @ 21:00)  HR: 116 (12-28-17 @ 07:56) (96 - 116)  BP: 127/80 (12-28-17 @ 04:15) (95/59 - 143/83)  RR: 18 (12-28-17 @ 04:15) (18 - 18)  SpO2: 94% (12-28-17 @ 04:15) (94% - 95%)  Wt(kg): --  ,   I&O's Summary    27 Dec 2017 07:01  -  28 Dec 2017 07:00  --------------------------------------------------------  IN: 560 mL / OUT: 400 mL / NET: 160 mL    28 Dec 2017 07:01  -  28 Dec 2017 10:03  --------------------------------------------------------  IN: 240 mL / OUT: 0 mL / NET: 240 mL                 *****PHYSICAL EXAM:  GEN: A&O X 3 , NAD , comfortable  HEENT: NCAT, EOMI, MMM, no icterus  NECK: Supple, No JVD  CVS: S1S2 , regular , No M/R/G appreciated  PULM: CTA B/L,  no W/R/R appreciated  ABD.: soft. non tender, non distended,  bowel sounds present  Extrem: intact pulses , no edema noted  Derm: No rash or ecchymosis noted  PSYCH: normal mood, no depression, not anxious         *****LAB AND IMAGING:                     [All pertinent recent Imaging/Reports reviewed]  < from: Transthoracic Echocardiogram (12.21.17 @ 15:00) >  Conclusions:  1. Normal left ventricular internal dimensions and wall  thicknesses.  2. Normal left ventricular systolic function. No segmental  wall motion abnormalities.  3. Normal diastolic function  4. Normal right ventricular size and function.  *** Compared with echocardiogram of 6/27/2017, no  significant changes noted.    < end of copied text >         *****A S S E S S M E N T   A N D   P L A N :  83M viral URI, fall vs syncope  pt baseline dementia, unable to give details of episode  ILR with brief episodes of bradycardia as well as PAT/SVT  no new events on tele  echo 6/17, no need to repeat  supportive tx for URI  VSS  DCP    __________________________  A. EVGENY Dunlap.

## 2017-12-28 NOTE — DIETITIAN INITIAL EVALUATION ADULT. - PT NOT SOURCE
confused/Pt noted with Alzheimer dementia. Pt with limited interest in RD interview at this time. Limited subjective information collected at this time, no family at bedside.

## 2017-12-28 NOTE — DIETITIAN INITIAL EVALUATION ADULT. - PROBLEM SELECTOR PLAN 4
Patient has multiple reports of previous falls / no clear etiology identified   Patient was evaluated by Neurology and Psych on previous admission   will get PT evaluation   close monitoring  B12 and TSH were within range from 11/2017

## 2017-12-28 NOTE — DIETITIAN INITIAL EVALUATION ADULT. - PROBLEM SELECTOR PLAN 2
Unsure of Etiology   Trop neg / will get another set of cardiac enzymes   PT evaluation   Will get TTE/ pt had previous TTE done in 6/2017 with normal EF   Pt had Loop recorder evaluation on 10/2017 with report of 3-4 seconds Pause/ Might need another evaluation if warranted   As per Telemetry patient is in Sinus rhythm with rate =  / current rate= 87

## 2017-12-29 RX ORDER — QUETIAPINE FUMARATE 200 MG/1
25 TABLET, FILM COATED ORAL
Qty: 0 | Refills: 0 | Status: DISCONTINUED | OUTPATIENT
Start: 2017-12-29 | End: 2018-01-05

## 2017-12-29 RX ORDER — QUETIAPINE FUMARATE 200 MG/1
25 TABLET, FILM COATED ORAL DAILY
Qty: 0 | Refills: 0 | Status: COMPLETED | OUTPATIENT
Start: 2017-12-29 | End: 2017-12-29

## 2017-12-29 RX ADMIN — MEMANTINE HYDROCHLORIDE 10 MILLIGRAM(S): 10 TABLET ORAL at 22:24

## 2017-12-29 RX ADMIN — Medication 1 SPRAY(S): at 12:55

## 2017-12-29 RX ADMIN — MEMANTINE HYDROCHLORIDE 5 MILLIGRAM(S): 10 TABLET ORAL at 12:55

## 2017-12-29 RX ADMIN — HEPARIN SODIUM 5000 UNIT(S): 5000 INJECTION INTRAVENOUS; SUBCUTANEOUS at 22:25

## 2017-12-29 RX ADMIN — Medication 100 MILLIGRAM(S): at 22:25

## 2017-12-29 RX ADMIN — HEPARIN SODIUM 5000 UNIT(S): 5000 INJECTION INTRAVENOUS; SUBCUTANEOUS at 13:00

## 2017-12-29 RX ADMIN — FAMOTIDINE 20 MILLIGRAM(S): 10 INJECTION INTRAVENOUS at 17:46

## 2017-12-29 RX ADMIN — Medication 1000 UNIT(S): at 17:46

## 2017-12-29 RX ADMIN — Medication 81 MILLIGRAM(S): at 12:55

## 2017-12-29 RX ADMIN — MIDODRINE HYDROCHLORIDE 5 MILLIGRAM(S): 2.5 TABLET ORAL at 17:46

## 2017-12-29 RX ADMIN — HEPARIN SODIUM 5000 UNIT(S): 5000 INJECTION INTRAVENOUS; SUBCUTANEOUS at 05:14

## 2017-12-29 RX ADMIN — NYSTATIN CREAM 1 APPLICATION(S): 100000 CREAM TOPICAL at 05:14

## 2017-12-29 RX ADMIN — Medication 100 MILLIGRAM(S): at 05:14

## 2017-12-29 RX ADMIN — MIDODRINE HYDROCHLORIDE 5 MILLIGRAM(S): 2.5 TABLET ORAL at 05:14

## 2017-12-29 RX ADMIN — Medication 100 MILLIGRAM(S): at 12:55

## 2017-12-29 RX ADMIN — Medication 1 TABLET(S): at 12:55

## 2017-12-29 RX ADMIN — QUETIAPINE FUMARATE 25 MILLIGRAM(S): 200 TABLET, FILM COATED ORAL at 22:25

## 2017-12-29 RX ADMIN — QUETIAPINE FUMARATE 25 MILLIGRAM(S): 200 TABLET, FILM COATED ORAL at 12:55

## 2017-12-29 RX ADMIN — POLYETHYLENE GLYCOL 3350 17 GRAM(S): 17 POWDER, FOR SOLUTION ORAL at 12:54

## 2017-12-29 RX ADMIN — Medication 1000 UNIT(S): at 05:14

## 2017-12-29 RX ADMIN — FAMOTIDINE 20 MILLIGRAM(S): 10 INJECTION INTRAVENOUS at 05:14

## 2017-12-29 RX ADMIN — NYSTATIN CREAM 1 APPLICATION(S): 100000 CREAM TOPICAL at 17:46

## 2017-12-29 RX ADMIN — TAMSULOSIN HYDROCHLORIDE 0.4 MILLIGRAM(S): 0.4 CAPSULE ORAL at 22:25

## 2017-12-29 NOTE — PROGRESS NOTE ADULT - SUBJECTIVE AND OBJECTIVE BOX
- Patient seen and examined.  - In summary, patient is a 83y year old man who presented with Loss of Consciousness with resultant fall (20 Dec 2017 11:35)  - Today, patient is without complaints.         *****MEDICATIONS:    MEDICATIONS  (STANDING):  aspirin enteric coated 81 milliGRAM(s) Oral daily  cholecalciferol 1000 Unit(s) Oral two times a day  docusate sodium 100 milliGRAM(s) Oral three times a day  famotidine    Tablet 20 milliGRAM(s) Oral two times a day  heparin  Injectable 5000 Unit(s) SubCutaneous every 8 hours  influenza   Vaccine 0.5 milliLiter(s) IntraMuscular once  memantine 5 milliGRAM(s) Oral daily  memantine 10 milliGRAM(s) Oral at bedtime  midodrine 5 milliGRAM(s) Oral two times a day  multivitamin 1 Tablet(s) Oral daily  nystatin Powder 1 Application(s) Topical two times a day  polyethylene glycol 3350 17 Gram(s) Oral daily  QUEtiapine 25 milliGRAM(s) Oral at bedtime  QUEtiapine 25 milliGRAM(s) Oral daily  sodium chloride 0.65% Nasal 1 Spray(s) Both Nostrils daily  tamsulosin 0.4 milliGRAM(s) Oral at bedtime    MEDICATIONS  (PRN):  acetaminophen   Tablet 650 milliGRAM(s) Oral every 6 hours PRN For Temp greater than 38 C (100.4 F)  ALBUTerol/ipratropium for Nebulization 3 milliLiter(s) Nebulizer every 6 hours PRN Shortness of Breath and/or Wheezing  aluminum hydroxide/magnesium hydroxide/simethicone Suspension 30 milliLiter(s) Oral every 6 hours PRN Dyspepsia  ondansetron Injectable 4 milliGRAM(s) IV Push every 6 hours PRN Nausea             ***** REVIEW OF SYSTEM:  GEN: no fever, no chills, no pain  RESP: no SOB, no cough, no sputum  CVS: no chest pain, no palpitations, no edema  GI: no abdominal pain, no nausea, no vomiting, no constipation, no diarrhea  : no dysurea, no frequency  NEURO: no headache, no diziness  PSYCH: no depression, not anxious  Derm : no itching, no rash         ***** VITAL SIGNS:    T(F): 97.5 (12-29-17 @ 04:18), Max: 97.8 (12-28-17 @ 20:36)  HR: 95 (12-29-17 @ 04:18) (95 - 113)  BP: 110/71 (12-29-17 @ 04:18) (110/71 - 125/74)  RR: 18 (12-29-17 @ 04:18) (18 - 18)  SpO2: 95% (12-29-17 @ 04:18) (93% - 95%)  Wt(kg): --  ,   I&O's Summary    28 Dec 2017 07:01  -  29 Dec 2017 07:00  --------------------------------------------------------  IN: 720 mL / OUT: 350 mL / NET: 370 mL                   *****PHYSICAL EXAM:  GEN: A&O X 3 , NAD , comfortable  HEENT: NCAT, EOMI, MMM, no icterus  NECK: Supple, No JVD  CVS: S1S2 , regular , No M/R/G appreciated  PULM: CTA B/L,  no W/R/R appreciated  ABD.: soft. non tender, non distended,  bowel sounds present  Extrem: intact pulses , no edema noted  Derm: No rash or ecchymosis noted  PSYCH: normal mood, no depression, not anxious         *****LAB AND IMAGING:                     [All pertinent recent Imaging/Reports reviewed]  < from: Transthoracic Echocardiogram (12.21.17 @ 15:00) >  Conclusions:  1. Normal left ventricular internal dimensions and wall  thicknesses.  2. Normal left ventricular systolic function. No segmental  wall motion abnormalities.  3. Normal diastolic function  4. Normal right ventricular size and function.  *** Compared with echocardiogram of 6/27/2017, no  significant changes noted.    < end of copied text >         *****A S S E S S M E N T   A N D   P L A N :  83M viral URI, fall vs syncope  pt baseline dementia, unable to give details of episode  ILR with brief episodes of bradycardia as well as PAT/SVT  no events on tele  echo 6/17 noted  supportive tx for URI  VSS  DCP per primary    __________________________  MARY Dunlap D.O.

## 2017-12-29 NOTE — PROGRESS NOTE ADULT - SUBJECTIVE AND OBJECTIVE BOX
MEDICINE, PROGRESS NOTE [phone]    EVELIN CHAVEZ 83y [id number]    Patient seen and examined.  Patient is a 83y old  Male who presents with a chief complaint of Loss of Consciousness with resultant fall (20 Dec 2017 11:35)  Pt resting in bed.    PAST MEDICAL & SURGICAL HISTORY:  Alzheimer disease: with psuedobulbular affect  BPH (benign prostatic hypertrophy)  Renal calculi  Hyperlipidemia  GERD (gastroesophageal reflux disease)  Nephrolithiasis  H/O cataract extraction, right  S/P tonsillectomy    MEDICATIONS  (STANDING):  aspirin enteric coated 81 milliGRAM(s) Oral daily  cholecalciferol 1000 Unit(s) Oral two times a day  docusate sodium 100 milliGRAM(s) Oral three times a day  famotidine    Tablet 20 milliGRAM(s) Oral two times a day  heparin  Injectable 5000 Unit(s) SubCutaneous every 8 hours  influenza   Vaccine 0.5 milliLiter(s) IntraMuscular once  memantine 5 milliGRAM(s) Oral daily  memantine 10 milliGRAM(s) Oral at bedtime  midodrine 5 milliGRAM(s) Oral two times a day  multivitamin 1 Tablet(s) Oral daily  nystatin Powder 1 Application(s) Topical two times a day  polyethylene glycol 3350 17 Gram(s) Oral daily  QUEtiapine 25 milliGRAM(s) Oral at bedtime  sodium chloride 0.65% Nasal 1 Spray(s) Both Nostrils daily  tamsulosin 0.4 milliGRAM(s) Oral at bedtime    MEDICATIONS  (PRN):  acetaminophen   Tablet 650 milliGRAM(s) Oral every 6 hours PRN For Temp greater than 38 C (100.4 F)  ALBUTerol/ipratropium for Nebulization 3 milliLiter(s) Nebulizer every 6 hours PRN Shortness of Breath and/or Wheezing  aluminum hydroxide/magnesium hydroxide/simethicone Suspension 30 milliLiter(s) Oral every 6 hours PRN Dyspepsia  ondansetron Injectable 4 milliGRAM(s) IV Push every 6 hours PRN Nausea    Allergies    IV dye (Rash)  penicillin (Rash)    Intolerances        PHYSICAL EXAM:  Constitutional: NAD  HEENT: Normocephalic, EOMI  Neck:  No JVD  Respiratory: CTA B/L, No wheezes  Cardiovascular: S1, S2, RRR, + systolic murmur  Gastrointestinal: BS+, soft, NT/ND  Extremities: No peripheral edema  Neurological: AAOX1, no focal deficits  Psychiatric: Normal mood, normal affect  : No Barone    Vital Signs Last 24 Hrs  T(C): 36.2 (29 Dec 2017 12:18), Max: 36.6 (28 Dec 2017 20:36)  T(F): 97.2 (29 Dec 2017 12:18), Max: 97.8 (28 Dec 2017 20:36)  HR: 98 (29 Dec 2017 12:18) (95 - 111)  BP: 132/80 (29 Dec 2017 12:18) (110/71 - 132/80)  BP(mean): --  RR: 18 (29 Dec 2017 12:18) (18 - 18)  SpO2: 97% (29 Dec 2017 12:18) (93% - 97%)  I&O's Summary    28 Dec 2017 07:01  -  29 Dec 2017 07:00  --------------------------------------------------------  IN: 720 mL / OUT: 350 mL / NET: 370 mL    29 Dec 2017 07:01  -  29 Dec 2017 15:58  --------------------------------------------------------  IN: 480 mL / OUT: 0 mL / NET: 480 mL

## 2017-12-29 NOTE — PROGRESS NOTE ADULT - SUBJECTIVE AND OBJECTIVE BOX
Events noted. NP and RN tell me pt. has had periods of trying to get out of the chair and walk on his own. When staff approach him during those episodes, he swings his arms as if to hit the staff. Calms down with Seroquel. He complains of ear pain.       Vital Signs Last 24 Hrs  T(C): 36.2 (29 Dec 2017 12:18), Max: 36.6 (28 Dec 2017 20:36)  T(F): 97.2 (29 Dec 2017 12:18), Max: 97.8 (28 Dec 2017 20:36)  HR: 98 (29 Dec 2017 12:18) (95 - 111)  BP: 132/80 (29 Dec 2017 12:18) (110/71 - 132/80)  BP(mean): --  RR: 18 (29 Dec 2017 12:18) (18 - 18)  SpO2: 97% (29 Dec 2017 12:18) (93% - 97%)                        MEDICATIONS  (STANDING):  aspirin enteric coated 81 milliGRAM(s) Oral daily  cholecalciferol 1000 Unit(s) Oral two times a day  docusate sodium 100 milliGRAM(s) Oral three times a day  famotidine    Tablet 20 milliGRAM(s) Oral two times a day  heparin  Injectable 5000 Unit(s) SubCutaneous every 8 hours  influenza   Vaccine 0.5 milliLiter(s) IntraMuscular once  memantine 5 milliGRAM(s) Oral daily  memantine 10 milliGRAM(s) Oral at bedtime  midodrine 5 milliGRAM(s) Oral two times a day  multivitamin 1 Tablet(s) Oral daily  nystatin Powder 1 Application(s) Topical two times a day  polyethylene glycol 3350 17 Gram(s) Oral daily  QUEtiapine 25 milliGRAM(s) Oral at bedtime  sodium chloride 0.65% Nasal 1 Spray(s) Both Nostrils daily  tamsulosin 0.4 milliGRAM(s) Oral at bedtime    MEDICATIONS  (PRN):  acetaminophen   Tablet 650 milliGRAM(s) Oral every 6 hours PRN For Temp greater than 38 C (100.4 F)  ALBUTerol/ipratropium for Nebulization 3 milliLiter(s) Nebulizer every 6 hours PRN Shortness of Breath and/or Wheezing  aluminum hydroxide/magnesium hydroxide/simethicone Suspension 30 milliLiter(s) Oral every 6 hours PRN Dyspepsia  ondansetron Injectable 4 milliGRAM(s) IV Push every 6 hours PRN Nausea      Elderly WM in chair, awake, alert and oriented x 1-2 (not to name of hospital). No cogwheel rigidity. Insight and judgment are poor.  Speech is minimal with normal rate and volume. No hallucinations nor delusions. The patient denied suicidal and homicidal ideation and plan. Mood is euthymic and affect irritable. Attention and concentration impaired as is short term memory; his long term memory is within normal limits.

## 2017-12-30 LAB
ANION GAP SERPL CALC-SCNC: 14 MMOL/L — SIGNIFICANT CHANGE UP (ref 5–17)
BUN SERPL-MCNC: 17 MG/DL — SIGNIFICANT CHANGE UP (ref 7–23)
CALCIUM SERPL-MCNC: 10.4 MG/DL — SIGNIFICANT CHANGE UP (ref 8.4–10.5)
CHLORIDE SERPL-SCNC: 102 MMOL/L — SIGNIFICANT CHANGE UP (ref 96–108)
CO2 SERPL-SCNC: 26 MMOL/L — SIGNIFICANT CHANGE UP (ref 22–31)
CREAT SERPL-MCNC: 1.09 MG/DL — SIGNIFICANT CHANGE UP (ref 0.5–1.3)
GLUCOSE SERPL-MCNC: 83 MG/DL — SIGNIFICANT CHANGE UP (ref 70–99)
HCT VFR BLD CALC: 44.2 % — SIGNIFICANT CHANGE UP (ref 39–50)
HGB BLD-MCNC: 14.5 G/DL — SIGNIFICANT CHANGE UP (ref 13–17)
MAGNESIUM SERPL-MCNC: 2.3 MG/DL — SIGNIFICANT CHANGE UP (ref 1.6–2.6)
MCHC RBC-ENTMCNC: 32.8 GM/DL — SIGNIFICANT CHANGE UP (ref 32–36)
MCHC RBC-ENTMCNC: 33.6 PG — SIGNIFICANT CHANGE UP (ref 27–34)
MCV RBC AUTO: 102.6 FL — HIGH (ref 80–100)
PHOSPHATE SERPL-MCNC: 4.1 MG/DL — SIGNIFICANT CHANGE UP (ref 2.5–4.5)
PLATELET # BLD AUTO: 250 K/UL — SIGNIFICANT CHANGE UP (ref 150–400)
POTASSIUM SERPL-MCNC: 4.6 MMOL/L — SIGNIFICANT CHANGE UP (ref 3.5–5.3)
POTASSIUM SERPL-SCNC: 4.6 MMOL/L — SIGNIFICANT CHANGE UP (ref 3.5–5.3)
RBC # BLD: 4.31 M/UL — SIGNIFICANT CHANGE UP (ref 4.2–5.8)
RBC # FLD: 14.7 % — HIGH (ref 10.3–14.5)
SODIUM SERPL-SCNC: 142 MMOL/L — SIGNIFICANT CHANGE UP (ref 135–145)
WBC # BLD: 7.16 K/UL — SIGNIFICANT CHANGE UP (ref 3.8–10.5)
WBC # FLD AUTO: 7.16 K/UL — SIGNIFICANT CHANGE UP (ref 3.8–10.5)

## 2017-12-30 RX ORDER — MIDODRINE HYDROCHLORIDE 2.5 MG/1
5 TABLET ORAL THREE TIMES A DAY
Qty: 0 | Refills: 0 | Status: DISCONTINUED | OUTPATIENT
Start: 2017-12-30 | End: 2018-01-08

## 2017-12-30 RX ADMIN — NYSTATIN CREAM 1 APPLICATION(S): 100000 CREAM TOPICAL at 17:39

## 2017-12-30 RX ADMIN — MEMANTINE HYDROCHLORIDE 5 MILLIGRAM(S): 10 TABLET ORAL at 14:00

## 2017-12-30 RX ADMIN — HEPARIN SODIUM 5000 UNIT(S): 5000 INJECTION INTRAVENOUS; SUBCUTANEOUS at 21:07

## 2017-12-30 RX ADMIN — Medication 1 TABLET(S): at 14:00

## 2017-12-30 RX ADMIN — HEPARIN SODIUM 5000 UNIT(S): 5000 INJECTION INTRAVENOUS; SUBCUTANEOUS at 05:27

## 2017-12-30 RX ADMIN — NYSTATIN CREAM 1 APPLICATION(S): 100000 CREAM TOPICAL at 05:27

## 2017-12-30 RX ADMIN — Medication 100 MILLIGRAM(S): at 21:07

## 2017-12-30 RX ADMIN — Medication 1000 UNIT(S): at 05:27

## 2017-12-30 RX ADMIN — Medication 81 MILLIGRAM(S): at 13:59

## 2017-12-30 RX ADMIN — POLYETHYLENE GLYCOL 3350 17 GRAM(S): 17 POWDER, FOR SOLUTION ORAL at 14:00

## 2017-12-30 RX ADMIN — HEPARIN SODIUM 5000 UNIT(S): 5000 INJECTION INTRAVENOUS; SUBCUTANEOUS at 14:01

## 2017-12-30 RX ADMIN — MIDODRINE HYDROCHLORIDE 5 MILLIGRAM(S): 2.5 TABLET ORAL at 15:02

## 2017-12-30 RX ADMIN — MIDODRINE HYDROCHLORIDE 5 MILLIGRAM(S): 2.5 TABLET ORAL at 21:07

## 2017-12-30 RX ADMIN — Medication 100 MILLIGRAM(S): at 14:01

## 2017-12-30 RX ADMIN — MIDODRINE HYDROCHLORIDE 5 MILLIGRAM(S): 2.5 TABLET ORAL at 05:27

## 2017-12-30 RX ADMIN — FAMOTIDINE 20 MILLIGRAM(S): 10 INJECTION INTRAVENOUS at 05:27

## 2017-12-30 RX ADMIN — FAMOTIDINE 20 MILLIGRAM(S): 10 INJECTION INTRAVENOUS at 17:39

## 2017-12-30 RX ADMIN — QUETIAPINE FUMARATE 25 MILLIGRAM(S): 200 TABLET, FILM COATED ORAL at 10:09

## 2017-12-30 RX ADMIN — MEMANTINE HYDROCHLORIDE 10 MILLIGRAM(S): 10 TABLET ORAL at 21:07

## 2017-12-30 RX ADMIN — TAMSULOSIN HYDROCHLORIDE 0.4 MILLIGRAM(S): 0.4 CAPSULE ORAL at 21:07

## 2017-12-30 RX ADMIN — Medication 100 MILLIGRAM(S): at 05:27

## 2017-12-30 RX ADMIN — Medication 1000 UNIT(S): at 17:38

## 2017-12-30 RX ADMIN — QUETIAPINE FUMARATE 25 MILLIGRAM(S): 200 TABLET, FILM COATED ORAL at 21:07

## 2017-12-30 RX ADMIN — Medication 1 SPRAY(S): at 14:00

## 2017-12-30 NOTE — PROGRESS NOTE ADULT - SUBJECTIVE AND OBJECTIVE BOX
MEDICINE, PROGRESS NOTE [phone]    EVELIN CHAVEZ 83y [id number]    Patient seen and examined.  Patient is a 83y old  Male who presents with a chief complaint of Loss of Consciousness with resultant fall (20 Dec 2017 11:35)  Pt pleasantly confused.    PAST MEDICAL & SURGICAL HISTORY:  Alzheimer disease: with psuedobulbular affect  BPH (benign prostatic hypertrophy)  Renal calculi  Hyperlipidemia  GERD (gastroesophageal reflux disease)  Nephrolithiasis  H/O cataract extraction, right  S/P tonsillectomy    MEDICATIONS  (STANDING):  aspirin enteric coated 81 milliGRAM(s) Oral daily  cholecalciferol 1000 Unit(s) Oral two times a day  docusate sodium 100 milliGRAM(s) Oral three times a day  famotidine    Tablet 20 milliGRAM(s) Oral two times a day  heparin  Injectable 5000 Unit(s) SubCutaneous every 8 hours  influenza   Vaccine 0.5 milliLiter(s) IntraMuscular once  memantine 5 milliGRAM(s) Oral daily  memantine 10 milliGRAM(s) Oral at bedtime  midodrine 5 milliGRAM(s) Oral three times a day  multivitamin 1 Tablet(s) Oral daily  nystatin Powder 1 Application(s) Topical two times a day  polyethylene glycol 3350 17 Gram(s) Oral daily  QUEtiapine 25 milliGRAM(s) Oral two times a day  sodium chloride 0.65% Nasal 1 Spray(s) Both Nostrils daily  tamsulosin 0.4 milliGRAM(s) Oral at bedtime    MEDICATIONS  (PRN):  acetaminophen   Tablet 650 milliGRAM(s) Oral every 6 hours PRN For Temp greater than 38 C (100.4 F)  ALBUTerol/ipratropium for Nebulization 3 milliLiter(s) Nebulizer every 6 hours PRN Shortness of Breath and/or Wheezing  aluminum hydroxide/magnesium hydroxide/simethicone Suspension 30 milliLiter(s) Oral every 6 hours PRN Dyspepsia  ondansetron Injectable 4 milliGRAM(s) IV Push every 6 hours PRN Nausea    Allergies    IV dye (Rash)  penicillin (Rash)    Intolerances        PHYSICAL EXAM:  Constitutional: NAD  HEENT: Normocephalic, EOMI  Neck:  No JVD  Respiratory: CTA B/L, No wheezes  Cardiovascular: S1, S2, RRR, + systolic murmur  Gastrointestinal: BS+, soft, NT/ND  Extremities: No peripheral edema  Neurological: AAOX2, no focal deficits  Psychiatric: Normal mood, normal affect  : No Barone    Vital Signs Last 24 Hrs  T(C): 36.4 (30 Dec 2017 14:42), Max: 36.7 (29 Dec 2017 20:31)  T(F): 97.5 (30 Dec 2017 14:42), Max: 98 (29 Dec 2017 20:31)  HR: 98 (30 Dec 2017 14:42) (83 - 98)  BP: 114/73 (30 Dec 2017 14:42) (100/66 - 116/75)  BP(mean): --  RR: 18 (30 Dec 2017 14:42) (18 - 18)  SpO2: 95% (30 Dec 2017 14:42) (95% - 97%)  I&O's Summary    29 Dec 2017 07:01  -  30 Dec 2017 07:00  --------------------------------------------------------  IN: 720 mL / OUT: 300 mL / NET: 420 mL    30 Dec 2017 07:01  -  30 Dec 2017 17:30  --------------------------------------------------------  IN: 480 mL / OUT: 100 mL / NET: 380 mL        LABS:                        14.5   7.16  )-----------( 250      ( 30 Dec 2017 07:21 )             44.2     12-30    142  |  102  |  17  ----------------------------<  83  4.6   |  26  |  1.09    Ca    10.4      30 Dec 2017 07:14  Phos  4.1     12-30  Mg     2.3     12-30          Magnesium, Serum: 2.3 mg/dL (12-30 @ 07:14)

## 2017-12-30 NOTE — PROVIDER CONTACT NOTE (OTHER) - ASSESSMENT
Pt sleeping peacefully during event, AAOx1, Unable to verbalize if he felt palpitation, chest pn, or SOB.  /77, 87 BPM.

## 2017-12-31 LAB
ANION GAP SERPL CALC-SCNC: 15 MMOL/L — SIGNIFICANT CHANGE UP (ref 5–17)
BUN SERPL-MCNC: 23 MG/DL — SIGNIFICANT CHANGE UP (ref 7–23)
CALCIUM SERPL-MCNC: 10 MG/DL — SIGNIFICANT CHANGE UP (ref 8.4–10.5)
CHLORIDE SERPL-SCNC: 102 MMOL/L — SIGNIFICANT CHANGE UP (ref 96–108)
CO2 SERPL-SCNC: 26 MMOL/L — SIGNIFICANT CHANGE UP (ref 22–31)
CREAT SERPL-MCNC: 1.09 MG/DL — SIGNIFICANT CHANGE UP (ref 0.5–1.3)
GLUCOSE SERPL-MCNC: 101 MG/DL — HIGH (ref 70–99)
HCT VFR BLD CALC: 43.5 % — SIGNIFICANT CHANGE UP (ref 39–50)
HGB BLD-MCNC: 14.6 G/DL — SIGNIFICANT CHANGE UP (ref 13–17)
MCHC RBC-ENTMCNC: 33.2 PG — SIGNIFICANT CHANGE UP (ref 27–34)
MCHC RBC-ENTMCNC: 33.6 GM/DL — SIGNIFICANT CHANGE UP (ref 32–36)
MCV RBC AUTO: 98.9 FL — SIGNIFICANT CHANGE UP (ref 80–100)
PLATELET # BLD AUTO: 265 K/UL — SIGNIFICANT CHANGE UP (ref 150–400)
POTASSIUM SERPL-MCNC: 4.4 MMOL/L — SIGNIFICANT CHANGE UP (ref 3.5–5.3)
POTASSIUM SERPL-SCNC: 4.4 MMOL/L — SIGNIFICANT CHANGE UP (ref 3.5–5.3)
RBC # BLD: 4.4 M/UL — SIGNIFICANT CHANGE UP (ref 4.2–5.8)
RBC # FLD: 14.4 % — SIGNIFICANT CHANGE UP (ref 10.3–14.5)
SODIUM SERPL-SCNC: 143 MMOL/L — SIGNIFICANT CHANGE UP (ref 135–145)
WBC # BLD: 7.58 K/UL — SIGNIFICANT CHANGE UP (ref 3.8–10.5)
WBC # FLD AUTO: 7.58 K/UL — SIGNIFICANT CHANGE UP (ref 3.8–10.5)

## 2017-12-31 RX ADMIN — MEMANTINE HYDROCHLORIDE 10 MILLIGRAM(S): 10 TABLET ORAL at 21:45

## 2017-12-31 RX ADMIN — HEPARIN SODIUM 5000 UNIT(S): 5000 INJECTION INTRAVENOUS; SUBCUTANEOUS at 05:21

## 2017-12-31 RX ADMIN — QUETIAPINE FUMARATE 25 MILLIGRAM(S): 200 TABLET, FILM COATED ORAL at 21:45

## 2017-12-31 RX ADMIN — HEPARIN SODIUM 5000 UNIT(S): 5000 INJECTION INTRAVENOUS; SUBCUTANEOUS at 21:45

## 2017-12-31 RX ADMIN — QUETIAPINE FUMARATE 25 MILLIGRAM(S): 200 TABLET, FILM COATED ORAL at 08:53

## 2017-12-31 RX ADMIN — MIDODRINE HYDROCHLORIDE 5 MILLIGRAM(S): 2.5 TABLET ORAL at 21:45

## 2017-12-31 RX ADMIN — Medication 1 TABLET(S): at 08:54

## 2017-12-31 RX ADMIN — Medication 100 MILLIGRAM(S): at 05:21

## 2017-12-31 RX ADMIN — Medication 100 MILLIGRAM(S): at 21:45

## 2017-12-31 RX ADMIN — MIDODRINE HYDROCHLORIDE 5 MILLIGRAM(S): 2.5 TABLET ORAL at 13:16

## 2017-12-31 RX ADMIN — Medication 100 MILLIGRAM(S): at 13:16

## 2017-12-31 RX ADMIN — HEPARIN SODIUM 5000 UNIT(S): 5000 INJECTION INTRAVENOUS; SUBCUTANEOUS at 13:16

## 2017-12-31 RX ADMIN — Medication 1000 UNIT(S): at 05:21

## 2017-12-31 RX ADMIN — Medication 81 MILLIGRAM(S): at 08:54

## 2017-12-31 RX ADMIN — Medication 1 SPRAY(S): at 08:54

## 2017-12-31 RX ADMIN — NYSTATIN CREAM 1 APPLICATION(S): 100000 CREAM TOPICAL at 05:21

## 2017-12-31 RX ADMIN — NYSTATIN CREAM 1 APPLICATION(S): 100000 CREAM TOPICAL at 17:15

## 2017-12-31 RX ADMIN — POLYETHYLENE GLYCOL 3350 17 GRAM(S): 17 POWDER, FOR SOLUTION ORAL at 08:54

## 2017-12-31 RX ADMIN — MIDODRINE HYDROCHLORIDE 5 MILLIGRAM(S): 2.5 TABLET ORAL at 05:21

## 2017-12-31 RX ADMIN — FAMOTIDINE 20 MILLIGRAM(S): 10 INJECTION INTRAVENOUS at 05:21

## 2017-12-31 RX ADMIN — TAMSULOSIN HYDROCHLORIDE 0.4 MILLIGRAM(S): 0.4 CAPSULE ORAL at 21:45

## 2017-12-31 RX ADMIN — MEMANTINE HYDROCHLORIDE 5 MILLIGRAM(S): 10 TABLET ORAL at 08:54

## 2017-12-31 NOTE — PROGRESS NOTE ADULT - SUBJECTIVE AND OBJECTIVE BOX
Events noted. Nurse's aid tells me that pt. has been sleeping most of this morning, ate his breakfast, and occasionally tries to get out of his chair. Pt. did not offer a complaint to me.       Vital Signs Last 24 Hrs  T(C): 36.3 (31 Dec 2017 11:41), Max: 37.1 (31 Dec 2017 04:22)  T(F): 97.3 (31 Dec 2017 11:41), Max: 98.7 (31 Dec 2017 04:22)  HR: 76 (31 Dec 2017 11:41) (76 - 98)  BP: 107/74 (31 Dec 2017 11:41) (103/64 - 114/73)  BP(mean): --  RR: 18 (31 Dec 2017 11:41) (18 - 18)  SpO2: 95% (31 Dec 2017 11:41) (94% - 95%)                          14.6   7.58  )-----------( 265      ( 31 Dec 2017 09:16 )             43.5       12-31    143  |  102  |  23  ----------------------------<  101<H>  4.4   |  26  |  1.09    Ca    10.0      31 Dec 2017 09:11  Phos  4.1     12-30  Mg     2.3     12-30                MEDICATIONS  (STANDING):  aspirin enteric coated 81 milliGRAM(s) Oral daily  cholecalciferol 1000 Unit(s) Oral two times a day  docusate sodium 100 milliGRAM(s) Oral three times a day  famotidine    Tablet 20 milliGRAM(s) Oral two times a day  heparin  Injectable 5000 Unit(s) SubCutaneous every 8 hours  influenza   Vaccine 0.5 milliLiter(s) IntraMuscular once  memantine 5 milliGRAM(s) Oral daily  memantine 10 milliGRAM(s) Oral at bedtime  midodrine 5 milliGRAM(s) Oral three times a day  multivitamin 1 Tablet(s) Oral daily  nystatin Powder 1 Application(s) Topical two times a day  polyethylene glycol 3350 17 Gram(s) Oral daily  QUEtiapine 25 milliGRAM(s) Oral two times a day  sodium chloride 0.65% Nasal 1 Spray(s) Both Nostrils daily  tamsulosin 0.4 milliGRAM(s) Oral at bedtime    MEDICATIONS  (PRN):  acetaminophen   Tablet 650 milliGRAM(s) Oral every 6 hours PRN For Temp greater than 38 C (100.4 F)  ALBUTerol/ipratropium for Nebulization 3 milliLiter(s) Nebulizer every 6 hours PRN Shortness of Breath and/or Wheezing  aluminum hydroxide/magnesium hydroxide/simethicone Suspension 30 milliLiter(s) Oral every 6 hours PRN Dyspepsia  ondansetron Injectable 4 milliGRAM(s) IV Push every 6 hours PRN Nausea      Elderly WM in chair, calm, cooperative, alert and oriented x 2 .  No psychomotor abnormalities. No EPS.  Insight and judgment are poor. Speech is coherent, sparse, with normal rate and low volume. No hallucinations nor delusions. The patient denied suicidal and homicidal ideation and plan. Mood is euthymic and affect constricted to flat. Attention and concentration, short term memory both reduced and long term memory within normal limits.

## 2017-12-31 NOTE — PROGRESS NOTE ADULT - SUBJECTIVE AND OBJECTIVE BOX
MEDICINE, PROGRESS NOTE [phone]    EVELIN CHAVEZ 83y [id number]    Patient seen and examined.  Patient is a 83y old  Male who presents with a chief complaint of Loss of Consciousness with resultant fall (20 Dec 2017 11:35)  Pt has no current complaints. pleasantly confused.    PAST MEDICAL & SURGICAL HISTORY:  Alzheimer disease: with psuedobulbular affect  BPH (benign prostatic hypertrophy)  Renal calculi  Hyperlipidemia  GERD (gastroesophageal reflux disease)  Nephrolithiasis  H/O cataract extraction, right  S/P tonsillectomy    MEDICATIONS  (STANDING):  aspirin enteric coated 81 milliGRAM(s) Oral daily  cholecalciferol 1000 Unit(s) Oral two times a day  docusate sodium 100 milliGRAM(s) Oral three times a day  famotidine    Tablet 20 milliGRAM(s) Oral two times a day  heparin  Injectable 5000 Unit(s) SubCutaneous every 8 hours  influenza   Vaccine 0.5 milliLiter(s) IntraMuscular once  memantine 5 milliGRAM(s) Oral daily  memantine 10 milliGRAM(s) Oral at bedtime  midodrine 5 milliGRAM(s) Oral three times a day  multivitamin 1 Tablet(s) Oral daily  nystatin Powder 1 Application(s) Topical two times a day  polyethylene glycol 3350 17 Gram(s) Oral daily  QUEtiapine 25 milliGRAM(s) Oral two times a day  sodium chloride 0.65% Nasal 1 Spray(s) Both Nostrils daily  tamsulosin 0.4 milliGRAM(s) Oral at bedtime    MEDICATIONS  (PRN):  acetaminophen   Tablet 650 milliGRAM(s) Oral every 6 hours PRN For Temp greater than 38 C (100.4 F)  ALBUTerol/ipratropium for Nebulization 3 milliLiter(s) Nebulizer every 6 hours PRN Shortness of Breath and/or Wheezing  aluminum hydroxide/magnesium hydroxide/simethicone Suspension 30 milliLiter(s) Oral every 6 hours PRN Dyspepsia  ondansetron Injectable 4 milliGRAM(s) IV Push every 6 hours PRN Nausea    Allergies    IV dye (Rash)  penicillin (Rash)    Intolerances        PHYSICAL EXAM:  Constitutional: NAD  HEENT: Normocephalic, EOMI  Neck:  No JVD  Respiratory: CTA B/L, No wheezes  Cardiovascular: S1, S2, RRR, + systolic murmur  Gastrointestinal: BS+, soft, NT/ND  Extremities: No peripheral edema  Neurological: AAOX2, no focal deficits  Psychiatric: Normal mood, normal affect  : No Barone    Vital Signs Last 24 Hrs  T(C): 36.3 (31 Dec 2017 11:41), Max: 37.1 (31 Dec 2017 04:22)  T(F): 97.3 (31 Dec 2017 11:41), Max: 98.7 (31 Dec 2017 04:22)  HR: 76 (31 Dec 2017 11:41) (76 - 95)  BP: 107/74 (31 Dec 2017 11:41) (103/64 - 108/73)  BP(mean): --  RR: 18 (31 Dec 2017 11:41) (18 - 18)  SpO2: 95% (31 Dec 2017 11:41) (94% - 95%)  I&O's Summary    30 Dec 2017 07:01  -  31 Dec 2017 07:00  --------------------------------------------------------  IN: 1080 mL / OUT: 800 mL / NET: 280 mL    31 Dec 2017 07:01  -  31 Dec 2017 16:45  --------------------------------------------------------  IN: 240 mL / OUT: 200 mL / NET: 40 mL        LABS:                        14.6   7.58  )-----------( 265      ( 31 Dec 2017 09:16 )             43.5     12-31    143  |  102  |  23  ----------------------------<  101<H>  4.4   |  26  |  1.09    Ca    10.0      31 Dec 2017 09:11  Phos  4.1     12-30  Mg     2.3     12-30

## 2017-12-31 NOTE — PROGRESS NOTE ADULT - SUBJECTIVE AND OBJECTIVE BOX
CARDIOLOGY     PROGRESS  NOTE   ________________________________________________    CHIEF COMPLAINT:Patient is a 83y old  Male who presents with a chief complaint of Loss of Consciousness with resultant fall (20 Dec 2017 11:35)  doing well agitated.  	  REVIEW OF SYSTEMS:  CONSTITUTIONAL: No fever, weight loss, or fatigue  EYES: No eye pain, visual disturbances, or discharge  ENT:  No difficulty hearing, tinnitus, vertigo; No sinus or throat pain  NECK: No pain or stiffness  RESPIRATORY: No cough, wheezing, chills or hemoptysis; No Shortness of Breath  CARDIOVASCULAR: No chest pain, palpitations, passing out, dizziness, or leg swelling  GASTROINTESTINAL: No abdominal or epigastric pain. No nausea, vomiting, or hematemesis; No diarrhea or constipation. No melena or hematochezia.  GENITOURINARY: No dysuria, frequency, hematuria, or incontinence  NEUROLOGICAL: No headaches, memory loss, loss of strength, numbness, or tremors  SKIN: No itching, burning, rashes, or lesions   LYMPH Nodes: No enlarged glands  ENDOCRINE: No heat or cold intolerance; No hair loss  MUSCULOSKELETAL: No joint pain or swelling; No muscle, back, or extremity pain  PSYCHIATRIC: No depression, anxiety, mood swings, or difficulty sleeping  HEME/LYMPH: No easy bruising, or bleeding gums  ALLERGY AND IMMUNOLOGIC: No hives or eczema	    [ ] All others negative	  [ ] Unable to obtain    PHYSICAL EXAM:  T(C): 37.1 (12-31-17 @ 04:22), Max: 37.1 (12-31-17 @ 04:22)  HR: 91 (12-31-17 @ 04:22) (91 - 98)  BP: 103/64 (12-31-17 @ 04:22) (103/64 - 114/73)  RR: 18 (12-31-17 @ 04:22) (18 - 18)  SpO2: 94% (12-31-17 @ 04:22) (94% - 95%)  Wt(kg): --  I&O's Summary    30 Dec 2017 07:01  -  31 Dec 2017 07:00  --------------------------------------------------------  IN: 1080 mL / OUT: 800 mL / NET: 280 mL    31 Dec 2017 07:01  -  31 Dec 2017 08:45  --------------------------------------------------------  IN: 0 mL / OUT: 150 mL / NET: -150 mL        Appearance: Normal	  HEENT:   Normal oral mucosa, PERRL, EOMI	  Lymphatic: No lymphadenopathy  Cardiovascular: Normal S1 S2, No JVD, + murmurs, No edema  Respiratory: Lungs clear to auscultation	  Psychiatry: A & O x 3, Mood & affect appropriate  Gastrointestinal:  Soft, Non-tender, + BS	  Skin: No rashes, No ecchymoses, No cyanosis	  Neurologic: Non-focal  Extremities: Normal range of motion, No clubbing, cyanosis or edema  Vascular: Peripheral pulses palpable 2+ bilaterally    MEDICATIONS  (STANDING):  aspirin enteric coated 81 milliGRAM(s) Oral daily  cholecalciferol 1000 Unit(s) Oral two times a day  docusate sodium 100 milliGRAM(s) Oral three times a day  famotidine    Tablet 20 milliGRAM(s) Oral two times a day  heparin  Injectable 5000 Unit(s) SubCutaneous every 8 hours  influenza   Vaccine 0.5 milliLiter(s) IntraMuscular once  memantine 5 milliGRAM(s) Oral daily  memantine 10 milliGRAM(s) Oral at bedtime  midodrine 5 milliGRAM(s) Oral three times a day  multivitamin 1 Tablet(s) Oral daily  nystatin Powder 1 Application(s) Topical two times a day  polyethylene glycol 3350 17 Gram(s) Oral daily  QUEtiapine 25 milliGRAM(s) Oral two times a day  sodium chloride 0.65% Nasal 1 Spray(s) Both Nostrils daily  tamsulosin 0.4 milliGRAM(s) Oral at bedtime      TELEMETRY: 	    ECG:  	  RADIOLOGY:  OTHER: 	  	  LABS:	 	    CARDIAC MARKERS:                                14.5   7.16  )-----------( 250      ( 30 Dec 2017 07:21 )             44.2     12-30    142  |  102  |  17  ----------------------------<  83  4.6   |  26  |  1.09    Ca    10.4      30 Dec 2017 07:14  Phos  4.1     12-30  Mg     2.3     12-30      proBNP: Serum Pro-Brain Natriuretic Peptide: 114 pg/mL (12-20 @ 04:25)    Lipid Profile:   HgA1c:   TSH: Thyroid Stimulating Hormone, Serum: 1.89 uIU/mL (12-21 @ 07:36)          Assessment and plan  ---------------------------  DOING BETTER  BP IS BETTER CONTINUE MIDODRINE

## 2018-01-01 PROCEDURE — 93010 ELECTROCARDIOGRAM REPORT: CPT

## 2018-01-01 RX ADMIN — MIDODRINE HYDROCHLORIDE 5 MILLIGRAM(S): 2.5 TABLET ORAL at 22:25

## 2018-01-01 RX ADMIN — MEMANTINE HYDROCHLORIDE 10 MILLIGRAM(S): 10 TABLET ORAL at 22:25

## 2018-01-01 RX ADMIN — HEPARIN SODIUM 5000 UNIT(S): 5000 INJECTION INTRAVENOUS; SUBCUTANEOUS at 05:49

## 2018-01-01 RX ADMIN — FAMOTIDINE 20 MILLIGRAM(S): 10 INJECTION INTRAVENOUS at 05:49

## 2018-01-01 RX ADMIN — HEPARIN SODIUM 5000 UNIT(S): 5000 INJECTION INTRAVENOUS; SUBCUTANEOUS at 13:51

## 2018-01-01 RX ADMIN — FAMOTIDINE 20 MILLIGRAM(S): 10 INJECTION INTRAVENOUS at 16:21

## 2018-01-01 RX ADMIN — MEMANTINE HYDROCHLORIDE 5 MILLIGRAM(S): 10 TABLET ORAL at 10:55

## 2018-01-01 RX ADMIN — TAMSULOSIN HYDROCHLORIDE 0.4 MILLIGRAM(S): 0.4 CAPSULE ORAL at 22:25

## 2018-01-01 RX ADMIN — QUETIAPINE FUMARATE 25 MILLIGRAM(S): 200 TABLET, FILM COATED ORAL at 10:55

## 2018-01-01 RX ADMIN — NYSTATIN CREAM 1 APPLICATION(S): 100000 CREAM TOPICAL at 16:21

## 2018-01-01 RX ADMIN — Medication 1000 UNIT(S): at 05:49

## 2018-01-01 RX ADMIN — Medication 1000 UNIT(S): at 16:21

## 2018-01-01 RX ADMIN — NYSTATIN CREAM 1 APPLICATION(S): 100000 CREAM TOPICAL at 05:49

## 2018-01-01 RX ADMIN — QUETIAPINE FUMARATE 25 MILLIGRAM(S): 200 TABLET, FILM COATED ORAL at 22:25

## 2018-01-01 RX ADMIN — Medication 1 MILLIGRAM(S): at 10:55

## 2018-01-01 RX ADMIN — MIDODRINE HYDROCHLORIDE 5 MILLIGRAM(S): 2.5 TABLET ORAL at 16:21

## 2018-01-01 RX ADMIN — Medication 1 TABLET(S): at 10:55

## 2018-01-01 RX ADMIN — Medication 100 MILLIGRAM(S): at 05:49

## 2018-01-01 RX ADMIN — Medication 81 MILLIGRAM(S): at 10:55

## 2018-01-01 RX ADMIN — Medication 100 MILLIGRAM(S): at 22:25

## 2018-01-01 RX ADMIN — MIDODRINE HYDROCHLORIDE 5 MILLIGRAM(S): 2.5 TABLET ORAL at 05:49

## 2018-01-01 RX ADMIN — HEPARIN SODIUM 5000 UNIT(S): 5000 INJECTION INTRAVENOUS; SUBCUTANEOUS at 22:25

## 2018-01-01 NOTE — CHART NOTE - NSCHARTNOTEFT_GEN_A_CORE
Patient very agitation, flinging at the care givers, pushing the tray and throwing things, refusing medications, attempting to get OOB of the chair and refusing to be helped, uncooperative and is at risk for falls.   Plan Ativan 1 mg IVP x 1          EKG to check QTc - last QTc 454 Patient very agitation, flinging at the care givers, pushing the tray and throwing things, refusing medications, attempting to get OOB of the chair and refusing to be helped, uncooperative and is at risk for falls.  Plan Ativan 1 mg IVP x 1          QTc 446 on 1/1/18         Daily EKG to monitor Qtc    # [id number]

## 2018-01-01 NOTE — PROGRESS NOTE ADULT - SUBJECTIVE AND OBJECTIVE BOX
- Patient seen and examined.  - In summary, patient is a 83y year old man who presented with Loss of Consciousness with resultant fall (20 Dec 2017 11:35)  - Today, patient is without complaints.         *****MEDICATIONS:    MEDICATIONS  (STANDING):  aspirin enteric coated 81 milliGRAM(s) Oral daily  cholecalciferol 1000 Unit(s) Oral two times a day  docusate sodium 100 milliGRAM(s) Oral three times a day  famotidine    Tablet 20 milliGRAM(s) Oral two times a day  heparin  Injectable 5000 Unit(s) SubCutaneous every 8 hours  influenza   Vaccine 0.5 milliLiter(s) IntraMuscular once  memantine 5 milliGRAM(s) Oral daily  memantine 10 milliGRAM(s) Oral at bedtime  midodrine 5 milliGRAM(s) Oral three times a day  multivitamin 1 Tablet(s) Oral daily  nystatin Powder 1 Application(s) Topical two times a day  polyethylene glycol 3350 17 Gram(s) Oral daily  QUEtiapine 25 milliGRAM(s) Oral two times a day  sodium chloride 0.65% Nasal 1 Spray(s) Both Nostrils daily  tamsulosin 0.4 milliGRAM(s) Oral at bedtime    MEDICATIONS  (PRN):  acetaminophen   Tablet 650 milliGRAM(s) Oral every 6 hours PRN For Temp greater than 38 C (100.4 F)  ALBUTerol/ipratropium for Nebulization 3 milliLiter(s) Nebulizer every 6 hours PRN Shortness of Breath and/or Wheezing  aluminum hydroxide/magnesium hydroxide/simethicone Suspension 30 milliLiter(s) Oral every 6 hours PRN Dyspepsia  ondansetron Injectable 4 milliGRAM(s) IV Push every 6 hours PRN Nausea             ***** REVIEW OF SYSTEM:  GEN: no fever, no chills, no pain  RESP: no SOB, no cough, no sputum  CVS: no chest pain, no palpitations, no edema  GI: no abdominal pain, no nausea, no vomiting, no constipation, no diarrhea  : no dysurea, no frequency  NEURO: no headache, no diziness  PSYCH: no depression, not anxious  Derm : no itching, no rash         ***** VITAL SIGNS:    T(F): 98.4 (18 @ 04:21), Max: 98.4 (18 @ 04:21)  HR: 84 (18 @ 04:21) (76 - 89)  BP: 120/79 (18 @ 04:21) (107/74 - 120/79)  RR: 18 (18 @ 04:21) (18 - 18)  SpO2: 94% (18 @ 04:21) (94% - 96%)  Wt(kg): --  ,   I&O's Summary    31 Dec 2017 07:01  -  2018 07:00  --------------------------------------------------------  IN: 960 mL / OUT: 750 mL / NET: 210 mL                   *****PHYSICAL EXAM:  GEN: A&O X 3 , NAD , comfortable  HEENT: NCAT, EOMI, MMM, no icterus  NECK: Supple, No JVD  CVS: S1S2 , regular , No M/R/G appreciated  PULM: CTA B/L,  no W/R/R appreciated  ABD.: soft. non tender, non distended,  bowel sounds present  Extrem: intact pulses , no edema noted  Derm: No rash or ecchymosis noted  PSYCH: normal mood, no depression, not anxious         *****LAB AND IMAGIN.6   7.58  )-----------( 265      ( 31 Dec 2017 09:16 )             43.5               12-    143  |  102  |  23  ----------------------------<  101<H>  4.4   |  26  |  1.09    Ca    10.0      31 Dec 2017 09:11          [All pertinent recent Imaging/Reports reviewed]  < from: Transthoracic Echocardiogram (17 @ 15:00) >  Conclusions:  1. Normal left ventricular internal dimensions and wall  thicknesses.  2. Normal left ventricular systolic function. No segmental  wall motion abnormalities.  3. Normal diastolic function  4. Normal right ventricular size and function.  *** Compared with echocardiogram of 2017, no  significant changes noted.    < end of copied text >         *****A S S E S S M E N T   A N D   P L A N :  83M viral URI, fall vs syncope  pt baseline dementia, unable to give details of episode  ILR with brief episodes of bradycardia as well as PAT/SVT  no events on tele  echo  noted  supportive tx for URI  VSS  DCP awaiting rehab    __________________________  A. EVGENY Dunlap.

## 2018-01-01 NOTE — PROGRESS NOTE ADULT - SUBJECTIVE AND OBJECTIVE BOX
MEDICINE, PROGRESS NOTE [phone]    EVELIN CHAVEZ 83y [id number]    Patient seen and examined.  Patient is a 83y old  Male who presents with a chief complaint of Loss of Consciousness with resultant fall (20 Dec 2017 11:35)  Pt pleasantly confused, agitation events noted.    PAST MEDICAL & SURGICAL HISTORY:  Alzheimer disease: with psuedobulbular affect  BPH (benign prostatic hypertrophy)  Renal calculi  Hyperlipidemia  GERD (gastroesophageal reflux disease)  Nephrolithiasis  H/O cataract extraction, right  S/P tonsillectomy    MEDICATIONS  (STANDING):  aspirin enteric coated 81 milliGRAM(s) Oral daily  cholecalciferol 1000 Unit(s) Oral two times a day  docusate sodium 100 milliGRAM(s) Oral three times a day  famotidine    Tablet 20 milliGRAM(s) Oral two times a day  heparin  Injectable 5000 Unit(s) SubCutaneous every 8 hours  influenza   Vaccine 0.5 milliLiter(s) IntraMuscular once  memantine 5 milliGRAM(s) Oral daily  memantine 10 milliGRAM(s) Oral at bedtime  midodrine 5 milliGRAM(s) Oral three times a day  multivitamin 1 Tablet(s) Oral daily  nystatin Powder 1 Application(s) Topical two times a day  polyethylene glycol 3350 17 Gram(s) Oral daily  QUEtiapine 25 milliGRAM(s) Oral two times a day  sodium chloride 0.65% Nasal 1 Spray(s) Both Nostrils daily  tamsulosin 0.4 milliGRAM(s) Oral at bedtime    MEDICATIONS  (PRN):  acetaminophen   Tablet 650 milliGRAM(s) Oral every 6 hours PRN For Temp greater than 38 C (100.4 F)  ALBUTerol/ipratropium for Nebulization 3 milliLiter(s) Nebulizer every 6 hours PRN Shortness of Breath and/or Wheezing  aluminum hydroxide/magnesium hydroxide/simethicone Suspension 30 milliLiter(s) Oral every 6 hours PRN Dyspepsia  ondansetron Injectable 4 milliGRAM(s) IV Push every 6 hours PRN Nausea    Allergies    IV dye (Rash)  penicillin (Rash)    Intolerances        PHYSICAL EXAM:  Constitutional: NAD  HEENT: Normocephalic, EOMI  Neck:  No JVD  Respiratory: CTA B/L, No wheezes  Cardiovascular: S1, S2, RRR, + systolic murmur  Gastrointestinal: BS+, soft, NT/ND  Extremities: No peripheral edema  Neurological: AAOX1, no focal deficits  Psychiatric: Normal mood, normal affect  : No Barone    Vital Signs Last 24 Hrs  T(C): 36.4 (01 Jan 2018 13:38), Max: 36.9 (01 Jan 2018 04:21)  T(F): 97.6 (01 Jan 2018 13:38), Max: 98.4 (01 Jan 2018 04:21)  HR: 87 (01 Jan 2018 13:38) (84 - 89)  BP: 105/73 (01 Jan 2018 13:38) (105/73 - 120/79)  BP(mean): --  RR: 18 (01 Jan 2018 13:38) (18 - 18)  SpO2: 96% (01 Jan 2018 13:38) (94% - 96%)  I&O's Summary    31 Dec 2017 07:01  -  01 Jan 2018 07:00  --------------------------------------------------------  IN: 960 mL / OUT: 750 mL / NET: 210 mL    01 Jan 2018 07:01  -  01 Jan 2018 19:46  --------------------------------------------------------  IN: 0 mL / OUT: 300 mL / NET: -300 mL        LABS:                        14.6   7.58  )-----------( 265      ( 31 Dec 2017 09:16 )             43.5     12-31    143  |  102  |  23  ----------------------------<  101<H>  4.4   |  26  |  1.09    Ca    10.0      31 Dec 2017 09:11

## 2018-01-02 RX ADMIN — QUETIAPINE FUMARATE 25 MILLIGRAM(S): 200 TABLET, FILM COATED ORAL at 22:09

## 2018-01-02 RX ADMIN — Medication 1000 UNIT(S): at 05:28

## 2018-01-02 RX ADMIN — NYSTATIN CREAM 1 APPLICATION(S): 100000 CREAM TOPICAL at 05:29

## 2018-01-02 RX ADMIN — HEPARIN SODIUM 5000 UNIT(S): 5000 INJECTION INTRAVENOUS; SUBCUTANEOUS at 13:17

## 2018-01-02 RX ADMIN — Medication 1000 UNIT(S): at 18:12

## 2018-01-02 RX ADMIN — Medication 100 MILLIGRAM(S): at 05:29

## 2018-01-02 RX ADMIN — Medication 100 MILLIGRAM(S): at 22:09

## 2018-01-02 RX ADMIN — POLYETHYLENE GLYCOL 3350 17 GRAM(S): 17 POWDER, FOR SOLUTION ORAL at 13:16

## 2018-01-02 RX ADMIN — Medication 1 TABLET(S): at 13:16

## 2018-01-02 RX ADMIN — Medication 100 MILLIGRAM(S): at 13:18

## 2018-01-02 RX ADMIN — Medication 1 SPRAY(S): at 13:17

## 2018-01-02 RX ADMIN — MIDODRINE HYDROCHLORIDE 5 MILLIGRAM(S): 2.5 TABLET ORAL at 22:09

## 2018-01-02 RX ADMIN — MEMANTINE HYDROCHLORIDE 5 MILLIGRAM(S): 10 TABLET ORAL at 13:18

## 2018-01-02 RX ADMIN — FAMOTIDINE 20 MILLIGRAM(S): 10 INJECTION INTRAVENOUS at 18:12

## 2018-01-02 RX ADMIN — HEPARIN SODIUM 5000 UNIT(S): 5000 INJECTION INTRAVENOUS; SUBCUTANEOUS at 05:28

## 2018-01-02 RX ADMIN — FAMOTIDINE 20 MILLIGRAM(S): 10 INJECTION INTRAVENOUS at 05:28

## 2018-01-02 RX ADMIN — MIDODRINE HYDROCHLORIDE 5 MILLIGRAM(S): 2.5 TABLET ORAL at 16:14

## 2018-01-02 RX ADMIN — MEMANTINE HYDROCHLORIDE 10 MILLIGRAM(S): 10 TABLET ORAL at 22:09

## 2018-01-02 RX ADMIN — Medication 81 MILLIGRAM(S): at 13:16

## 2018-01-02 RX ADMIN — NYSTATIN CREAM 1 APPLICATION(S): 100000 CREAM TOPICAL at 18:12

## 2018-01-02 RX ADMIN — QUETIAPINE FUMARATE 25 MILLIGRAM(S): 200 TABLET, FILM COATED ORAL at 09:19

## 2018-01-02 RX ADMIN — TAMSULOSIN HYDROCHLORIDE 0.4 MILLIGRAM(S): 0.4 CAPSULE ORAL at 22:09

## 2018-01-02 RX ADMIN — MIDODRINE HYDROCHLORIDE 5 MILLIGRAM(S): 2.5 TABLET ORAL at 05:29

## 2018-01-02 RX ADMIN — HEPARIN SODIUM 5000 UNIT(S): 5000 INJECTION INTRAVENOUS; SUBCUTANEOUS at 22:09

## 2018-01-02 NOTE — PROGRESS NOTE ADULT - SUBJECTIVE AND OBJECTIVE BOX
- Patient seen and examined.  - In summary, patient is a 83y year old man who presented with Loss of Consciousness with resultant fall (20 Dec 2017 11:35)  - Today, patient is without complaints.         *****MEDICATIONS:    MEDICATIONS  (STANDING):  aspirin enteric coated 81 milliGRAM(s) Oral daily  cholecalciferol 1000 Unit(s) Oral two times a day  docusate sodium 100 milliGRAM(s) Oral three times a day  famotidine    Tablet 20 milliGRAM(s) Oral two times a day  heparin  Injectable 5000 Unit(s) SubCutaneous every 8 hours  influenza   Vaccine 0.5 milliLiter(s) IntraMuscular once  memantine 5 milliGRAM(s) Oral daily  memantine 10 milliGRAM(s) Oral at bedtime  midodrine 5 milliGRAM(s) Oral three times a day  multivitamin 1 Tablet(s) Oral daily  nystatin Powder 1 Application(s) Topical two times a day  polyethylene glycol 3350 17 Gram(s) Oral daily  QUEtiapine 25 milliGRAM(s) Oral two times a day  sodium chloride 0.65% Nasal 1 Spray(s) Both Nostrils daily  tamsulosin 0.4 milliGRAM(s) Oral at bedtime    MEDICATIONS  (PRN):  acetaminophen   Tablet 650 milliGRAM(s) Oral every 6 hours PRN For Temp greater than 38 C (100.4 F)  ALBUTerol/ipratropium for Nebulization 3 milliLiter(s) Nebulizer every 6 hours PRN Shortness of Breath and/or Wheezing  aluminum hydroxide/magnesium hydroxide/simethicone Suspension 30 milliLiter(s) Oral every 6 hours PRN Dyspepsia  ondansetron Injectable 4 milliGRAM(s) IV Push every 6 hours PRN Nausea               ***** REVIEW OF SYSTEM:  GEN: no fever, no chills, no pain  RESP: no SOB, no cough, no sputum  CVS: no chest pain, no palpitations, no edema  GI: no abdominal pain, no nausea, no vomiting, no constipation, no diarrhea  : no dysurea, no frequency  NEURO: no headache, no diziness  PSYCH: no depression, not anxious  Derm : no itching, no rash         ***** VITAL SIGNS:    T(F): 97.4 (01-02-18 @ 03:57), Max: 98.1 (01-01-18 @ 20:19)  HR: 97 (01-02-18 @ 03:57) (85 - 97)  BP: 106/63 (01-02-18 @ 03:57) (105/73 - 121/73)  RR: 18 (01-02-18 @ 03:57) (18 - 18)  SpO2: 95% (01-02-18 @ 03:57) (94% - 96%)  Wt(kg): --  ,   I&O's Summary    01 Jan 2018 07:01  -  02 Jan 2018 07:00  --------------------------------------------------------  IN: 390 mL / OUT: 500 mL / NET: -110 mL    02 Jan 2018 07:01  -  02 Jan 2018 09:52  --------------------------------------------------------  IN: 100 mL / OUT: 0 mL / NET: 100 mL                 *****PHYSICAL EXAM:  GEN: A&O X 3 , NAD , comfortable  HEENT: NCAT, EOMI, MMM, no icterus  NECK: Supple, No JVD  CVS: S1S2 , regular , No M/R/G appreciated  PULM: CTA B/L,  no W/R/R appreciated  ABD.: soft. non tender, non distended,  bowel sounds present  Extrem: intact pulses , no edema noted  Derm: No rash or ecchymosis noted  PSYCH: normal mood, no depression, not anxious         *****LAB AND IMAGING:                                [All pertinent recent Imaging/Reports reviewed]  < from: Transthoracic Echocardiogram (12.21.17 @ 15:00) >  Conclusions:  1. Normal left ventricular internal dimensions and wall  thicknesses.  2. Normal left ventricular systolic function. No segmental  wall motion abnormalities.  3. Normal diastolic function  4. Normal right ventricular size and function.  *** Compared with echocardiogram of 6/27/2017, no  significant changes noted.    < end of copied text >         *****A S S E S S M E N T   A N D   P L A N :  83M viral URI, fall vs syncope  pt baseline dementia, unable to give details of episode  ILR with brief episodes of bradycardia as well as PAT/SVT  no events on tele  echo 6/17 noted  supportive tx for URI  VSS  DCP  awaiting rehab    __________________________  A. EVGENY Dunlap.

## 2018-01-02 NOTE — PROGRESS NOTE ADULT - SUBJECTIVE AND OBJECTIVE BOX
MEDICINE, PROGRESS NOTE [phone]    EVELIN CHAVEZ 83y [id number]    Patient seen and examined.  Patient is a 83y old  Male who presents with a chief complaint of Loss of Consciousness with resultant fall (20 Dec 2017 11:35)  pt pleasantly confused.    PAST MEDICAL & SURGICAL HISTORY:  Alzheimer disease: with psuedobulbular affect  BPH (benign prostatic hypertrophy)  Renal calculi  Hyperlipidemia  GERD (gastroesophageal reflux disease)  Nephrolithiasis  H/O cataract extraction, right  S/P tonsillectomy    MEDICATIONS  (STANDING):  aspirin enteric coated 81 milliGRAM(s) Oral daily  cholecalciferol 1000 Unit(s) Oral two times a day  docusate sodium 100 milliGRAM(s) Oral three times a day  famotidine    Tablet 20 milliGRAM(s) Oral two times a day  heparin  Injectable 5000 Unit(s) SubCutaneous every 8 hours  influenza   Vaccine 0.5 milliLiter(s) IntraMuscular once  memantine 5 milliGRAM(s) Oral daily  memantine 10 milliGRAM(s) Oral at bedtime  midodrine 5 milliGRAM(s) Oral three times a day  multivitamin 1 Tablet(s) Oral daily  nystatin Powder 1 Application(s) Topical two times a day  polyethylene glycol 3350 17 Gram(s) Oral daily  QUEtiapine 25 milliGRAM(s) Oral two times a day  sodium chloride 0.65% Nasal 1 Spray(s) Both Nostrils daily  tamsulosin 0.4 milliGRAM(s) Oral at bedtime    MEDICATIONS  (PRN):  acetaminophen   Tablet 650 milliGRAM(s) Oral every 6 hours PRN For Temp greater than 38 C (100.4 F)  ALBUTerol/ipratropium for Nebulization 3 milliLiter(s) Nebulizer every 6 hours PRN Shortness of Breath and/or Wheezing  aluminum hydroxide/magnesium hydroxide/simethicone Suspension 30 milliLiter(s) Oral every 6 hours PRN Dyspepsia  ondansetron Injectable 4 milliGRAM(s) IV Push every 6 hours PRN Nausea    Allergies    IV dye (Rash)  penicillin (Rash)    Intolerances        PHYSICAL EXAM:  Constitutional: NAD  HEENT: Normocephalic, EOMI  Neck:  No JVD  Respiratory: CTA B/L, No wheezes  Cardiovascular: S1, S2, RRR, + systolic murmur  Gastrointestinal: BS+, soft, NT/ND  Extremities: No peripheral edema  Neurological: AAOX3, no focal deficits  Psychiatric: Normal mood, normal affect  : No Barone    Vital Signs Last 24 Hrs  T(C): 36.2 (02 Jan 2018 13:50), Max: 36.7 (01 Jan 2018 20:19)  T(F): 97.1 (02 Jan 2018 13:50), Max: 98.1 (01 Jan 2018 20:19)  HR: 96 (02 Jan 2018 13:50) (87 - 97)  BP: 103/63 (02 Jan 2018 13:50) (103/63 - 121/73)  BP(mean): --  RR: 18 (02 Jan 2018 13:50) (18 - 18)  SpO2: 94% (02 Jan 2018 13:50) (94% - 95%)  I&O's Summary    01 Jan 2018 07:01  -  02 Jan 2018 07:00  --------------------------------------------------------  IN: 390 mL / OUT: 500 mL / NET: -110 mL    02 Jan 2018 07:01  -  02 Jan 2018 19:44  --------------------------------------------------------  IN: 200 mL / OUT: 250 mL / NET: -50 mL

## 2018-01-03 PROCEDURE — 93010 ELECTROCARDIOGRAM REPORT: CPT

## 2018-01-03 RX ORDER — HALOPERIDOL DECANOATE 100 MG/ML
1 INJECTION INTRAMUSCULAR ONCE
Qty: 0 | Refills: 0 | Status: COMPLETED | OUTPATIENT
Start: 2018-01-03 | End: 2018-01-03

## 2018-01-03 RX ORDER — MEMANTINE HYDROCHLORIDE 10 MG/1
10 TABLET ORAL DAILY
Qty: 0 | Refills: 0 | Status: DISCONTINUED | OUTPATIENT
Start: 2018-01-03 | End: 2018-01-08

## 2018-01-03 RX ORDER — QUETIAPINE FUMARATE 200 MG/1
12.5 TABLET, FILM COATED ORAL ONCE
Qty: 0 | Refills: 0 | Status: DISCONTINUED | OUTPATIENT
Start: 2018-01-03 | End: 2018-01-04

## 2018-01-03 RX ADMIN — MEMANTINE HYDROCHLORIDE 5 MILLIGRAM(S): 10 TABLET ORAL at 12:01

## 2018-01-03 RX ADMIN — MIDODRINE HYDROCHLORIDE 5 MILLIGRAM(S): 2.5 TABLET ORAL at 17:15

## 2018-01-03 RX ADMIN — FAMOTIDINE 20 MILLIGRAM(S): 10 INJECTION INTRAVENOUS at 07:40

## 2018-01-03 RX ADMIN — Medication 100 MILLIGRAM(S): at 07:40

## 2018-01-03 RX ADMIN — NYSTATIN CREAM 1 APPLICATION(S): 100000 CREAM TOPICAL at 07:42

## 2018-01-03 RX ADMIN — Medication 1000 UNIT(S): at 07:40

## 2018-01-03 RX ADMIN — HEPARIN SODIUM 5000 UNIT(S): 5000 INJECTION INTRAVENOUS; SUBCUTANEOUS at 17:15

## 2018-01-03 RX ADMIN — HEPARIN SODIUM 5000 UNIT(S): 5000 INJECTION INTRAVENOUS; SUBCUTANEOUS at 07:40

## 2018-01-03 RX ADMIN — Medication 1 SPRAY(S): at 12:01

## 2018-01-03 RX ADMIN — POLYETHYLENE GLYCOL 3350 17 GRAM(S): 17 POWDER, FOR SOLUTION ORAL at 12:01

## 2018-01-03 RX ADMIN — Medication 1 TABLET(S): at 12:01

## 2018-01-03 RX ADMIN — Medication 81 MILLIGRAM(S): at 12:01

## 2018-01-03 RX ADMIN — NYSTATIN CREAM 1 APPLICATION(S): 100000 CREAM TOPICAL at 18:48

## 2018-01-03 RX ADMIN — QUETIAPINE FUMARATE 25 MILLIGRAM(S): 200 TABLET, FILM COATED ORAL at 18:26

## 2018-01-03 RX ADMIN — MIDODRINE HYDROCHLORIDE 5 MILLIGRAM(S): 2.5 TABLET ORAL at 07:40

## 2018-01-03 NOTE — PROGRESS NOTE ADULT - SUBJECTIVE AND OBJECTIVE BOX
- Patient seen and examined.  - In summary, patient is a 83y year old man who presented with Loss of Consciousness with resultant fall (20 Dec 2017 11:35)  - Today, patient is without complaints.         *****MEDICATIONS:    MEDICATIONS  (STANDING):  aspirin enteric coated 81 milliGRAM(s) Oral daily  cholecalciferol 1000 Unit(s) Oral two times a day  docusate sodium 100 milliGRAM(s) Oral three times a day  famotidine    Tablet 20 milliGRAM(s) Oral two times a day  heparin  Injectable 5000 Unit(s) SubCutaneous every 8 hours  influenza   Vaccine 0.5 milliLiter(s) IntraMuscular once  memantine 5 milliGRAM(s) Oral daily  memantine 10 milliGRAM(s) Oral at bedtime  midodrine 5 milliGRAM(s) Oral three times a day  multivitamin 1 Tablet(s) Oral daily  nystatin Powder 1 Application(s) Topical two times a day  polyethylene glycol 3350 17 Gram(s) Oral daily  QUEtiapine 25 milliGRAM(s) Oral two times a day  sodium chloride 0.65% Nasal 1 Spray(s) Both Nostrils daily  tamsulosin 0.4 milliGRAM(s) Oral at bedtime    MEDICATIONS  (PRN):  acetaminophen   Tablet 650 milliGRAM(s) Oral every 6 hours PRN For Temp greater than 38 C (100.4 F)  ALBUTerol/ipratropium for Nebulization 3 milliLiter(s) Nebulizer every 6 hours PRN Shortness of Breath and/or Wheezing  aluminum hydroxide/magnesium hydroxide/simethicone Suspension 30 milliLiter(s) Oral every 6 hours PRN Dyspepsia  ondansetron Injectable 4 milliGRAM(s) IV Push every 6 hours PRN Nausea               ***** REVIEW OF SYSTEM:  GEN: no fever, no chills, no pain  RESP: no SOB, no cough, no sputum  CVS: no chest pain, no palpitations, no edema  GI: no abdominal pain, no nausea, no vomiting, no constipation, no diarrhea  : no dysurea, no frequency  NEURO: no headache, no diziness  PSYCH: no depression, not anxious  Derm : no itching, no rash         ***** VITAL SIGNS:    T(F): 97.5 (01-03-18 @ 05:05), Max: 97.9 (01-02-18 @ 21:12)  HR: 109 (01-03-18 @ 05:05) (86 - 109)  BP: 122/75 (01-03-18 @ 05:05) (103/63 - 122/75)  RR: 18 (01-03-18 @ 05:05) (18 - 18)  SpO2: 94% (01-03-18 @ 05:05) (94% - 97%)  Wt(kg): --  ,   I&O's Summary    02 Jan 2018 07:01  -  03 Jan 2018 07:00  --------------------------------------------------------  IN: 1720 mL / OUT: 525 mL / NET: 1195 mL    03 Jan 2018 07:01  -  03 Jan 2018 10:14  --------------------------------------------------------  IN: 120 mL / OUT: 0 mL / NET: 120 mL                 *****PHYSICAL EXAM:  GEN: A&O X 3 , NAD , comfortable  HEENT: NCAT, EOMI, MMM, no icterus  NECK: Supple, No JVD  CVS: S1S2 , regular , No M/R/G appreciated  PULM: CTA B/L,  no W/R/R appreciated  ABD.: soft. non tender, non distended,  bowel sounds present  Extrem: intact pulses , no edema noted  Derm: No rash or ecchymosis noted  PSYCH: normal mood, no depression, not anxious         *****LAB AND IMAGING:                                [All pertinent recent Imaging/Reports reviewed]  < from: Transthoracic Echocardiogram (12.21.17 @ 15:00) >  Conclusions:  1. Normal left ventricular internal dimensions and wall  thicknesses.  2. Normal left ventricular systolic function. No segmental  wall motion abnormalities.  3. Normal diastolic function  4. Normal right ventricular size and function.  *** Compared with echocardiogram of 6/27/2017, no  significant changes noted.    < end of copied text >         *****A S S E S S M E N T   A N D   P L A N :  83M viral URI, fall vs syncope  pt baseline dementia, unable to give details of episode  ILR with brief episodes of bradycardia as well as PAT/SVT  no events on tele  echo 6/17 noted  supportive tx for URI  VSS  DCP  awaiting rehab    __________________________  AAruna Dunlap D.O.

## 2018-01-03 NOTE — CHART NOTE - NSCHARTNOTEFT_GEN_A_CORE
Notified by RN to notify that patient is very agitated. Haldol 1 mg IV X 1 given. Seroquel 25 mg po given. Patient is still agitated 2 hrs later d/w Dr Braxton who recommended 12.5 mg of Seroquel X1. Will continue to monitor patient. EKG done and Qtc WNL    Vital Signs Last 24 Hrs  T(C): 36.2 (03 Jan 2018 13:12), Max: 36.6 (02 Jan 2018 21:12)  T(F): 97.2 (03 Jan 2018 13:12), Max: 97.9 (02 Jan 2018 21:12)  HR: 105 (03 Jan 2018 13:12) (86 - 109)  BP: 102/67 (03 Jan 2018 13:12) (102/67 - 122/75)  BP(mean): --  RR: 18 (03 Jan 2018 13:12) (18 - 18)  SpO2: 94% (03 Jan 2018 13:12) (94% - 97%)

## 2018-01-03 NOTE — PROGRESS NOTE ADULT - SUBJECTIVE AND OBJECTIVE BOX
MEDICINE, PROGRESS NOTE [phone]    EVELIN CHAVEZ 83y [id number]    Patient seen and examined.  Patient is a 83y old  Male who presents with a chief complaint of Loss of Consciousness with resultant fall (20 Dec 2017 11:35)  Pt pleasantly confused.    PAST MEDICAL & SURGICAL HISTORY:  Alzheimer disease: with psuedobulbular affect  BPH (benign prostatic hypertrophy)  Renal calculi  Hyperlipidemia  GERD (gastroesophageal reflux disease)  Nephrolithiasis  H/O cataract extraction, right  S/P tonsillectomy    MEDICATIONS  (STANDING):  aspirin enteric coated 81 milliGRAM(s) Oral daily  cholecalciferol 1000 Unit(s) Oral two times a day  docusate sodium 100 milliGRAM(s) Oral three times a day  famotidine    Tablet 20 milliGRAM(s) Oral two times a day  heparin  Injectable 5000 Unit(s) SubCutaneous every 8 hours  influenza   Vaccine 0.5 milliLiter(s) IntraMuscular once  memantine 10 milliGRAM(s) Oral daily  memantine 10 milliGRAM(s) Oral at bedtime  midodrine 5 milliGRAM(s) Oral three times a day  multivitamin 1 Tablet(s) Oral daily  nystatin Powder 1 Application(s) Topical two times a day  polyethylene glycol 3350 17 Gram(s) Oral daily  QUEtiapine 25 milliGRAM(s) Oral two times a day  sodium chloride 0.65% Nasal 1 Spray(s) Both Nostrils daily  tamsulosin 0.4 milliGRAM(s) Oral at bedtime    MEDICATIONS  (PRN):  acetaminophen   Tablet 650 milliGRAM(s) Oral every 6 hours PRN For Temp greater than 38 C (100.4 F)  ALBUTerol/ipratropium for Nebulization 3 milliLiter(s) Nebulizer every 6 hours PRN Shortness of Breath and/or Wheezing  aluminum hydroxide/magnesium hydroxide/simethicone Suspension 30 milliLiter(s) Oral every 6 hours PRN Dyspepsia  ondansetron Injectable 4 milliGRAM(s) IV Push every 6 hours PRN Nausea    Allergies    IV dye (Rash)  penicillin (Rash)    Intolerances        PHYSICAL EXAM:  Constitutional: NAD  HEENT: Normocephalic, EOMI  Neck:  No JVD  Respiratory: CTA B/L, No wheezes  Cardiovascular: S1, S2, RRR, + systolic murmur  Gastrointestinal: BS+, soft, NT/ND  Extremities: No peripheral edema  Neurological: AAOX3, no focal deficits  Psychiatric: Normal mood, normal affect  : No Barone    Vital Signs Last 24 Hrs  T(C): 36.2 (03 Jan 2018 13:12), Max: 36.6 (02 Jan 2018 21:12)  T(F): 97.2 (03 Jan 2018 13:12), Max: 97.9 (02 Jan 2018 21:12)  HR: 105 (03 Jan 2018 13:12) (86 - 109)  BP: 102/67 (03 Jan 2018 13:12) (102/67 - 122/75)  BP(mean): --  RR: 18 (03 Jan 2018 13:12) (18 - 18)  SpO2: 94% (03 Jan 2018 13:12) (94% - 97%)  I&O's Summary    02 Jan 2018 07:01  -  03 Jan 2018 07:00  --------------------------------------------------------  IN: 1720 mL / OUT: 525 mL / NET: 1195 mL    03 Jan 2018 07:01  -  03 Jan 2018 18:15  --------------------------------------------------------  IN: 240 mL / OUT: 0 mL / NET: 240 mL

## 2018-01-03 NOTE — PROGRESS NOTE ADULT - SUBJECTIVE AND OBJECTIVE BOX
Events noted. Nursing aid states pt. ate his breakfast today and he tried to walk on his own.       Vital Signs Last 24 Hrs  T(C): 36.4 (03 Jan 2018 05:05), Max: 36.6 (02 Jan 2018 21:12)  T(F): 97.5 (03 Jan 2018 05:05), Max: 97.9 (02 Jan 2018 21:12)  HR: 109 (03 Jan 2018 05:05) (86 - 109)  BP: 122/75 (03 Jan 2018 05:05) (103/63 - 122/75)  BP(mean): --  RR: 18 (03 Jan 2018 05:05) (18 - 18)  SpO2: 94% (03 Jan 2018 05:05) (94% - 97%)                        MEDICATIONS  (STANDING):  aspirin enteric coated 81 milliGRAM(s) Oral daily  cholecalciferol 1000 Unit(s) Oral two times a day  docusate sodium 100 milliGRAM(s) Oral three times a day  famotidine    Tablet 20 milliGRAM(s) Oral two times a day  heparin  Injectable 5000 Unit(s) SubCutaneous every 8 hours  influenza   Vaccine 0.5 milliLiter(s) IntraMuscular once  memantine 5 milliGRAM(s) Oral daily  memantine 10 milliGRAM(s) Oral at bedtime  midodrine 5 milliGRAM(s) Oral three times a day  multivitamin 1 Tablet(s) Oral daily  nystatin Powder 1 Application(s) Topical two times a day  polyethylene glycol 3350 17 Gram(s) Oral daily  QUEtiapine 25 milliGRAM(s) Oral two times a day  sodium chloride 0.65% Nasal 1 Spray(s) Both Nostrils daily  tamsulosin 0.4 milliGRAM(s) Oral at bedtime    MEDICATIONS  (PRN):  acetaminophen   Tablet 650 milliGRAM(s) Oral every 6 hours PRN For Temp greater than 38 C (100.4 F)  ALBUTerol/ipratropium for Nebulization 3 milliLiter(s) Nebulizer every 6 hours PRN Shortness of Breath and/or Wheezing  aluminum hydroxide/magnesium hydroxide/simethicone Suspension 30 milliLiter(s) Oral every 6 hours PRN Dyspepsia  ondansetron Injectable 4 milliGRAM(s) IV Push every 6 hours PRN Nausea      Elderly WF in chair. Initially asleep. When he awakened to my voice he was mute. No EPS.

## 2018-01-04 PROCEDURE — 93010 ELECTROCARDIOGRAM REPORT: CPT

## 2018-01-04 RX ORDER — HALOPERIDOL DECANOATE 100 MG/ML
2 INJECTION INTRAMUSCULAR ONCE
Qty: 0 | Refills: 0 | Status: COMPLETED | OUTPATIENT
Start: 2018-01-04 | End: 2018-01-04

## 2018-01-04 RX ORDER — QUETIAPINE FUMARATE 200 MG/1
25 TABLET, FILM COATED ORAL ONCE
Qty: 0 | Refills: 0 | Status: COMPLETED | OUTPATIENT
Start: 2018-01-04 | End: 2018-01-04

## 2018-01-04 RX ORDER — HALOPERIDOL DECANOATE 100 MG/ML
1 INJECTION INTRAMUSCULAR ONCE
Qty: 0 | Refills: 0 | Status: COMPLETED | OUTPATIENT
Start: 2018-01-04 | End: 2018-01-04

## 2018-01-04 RX ADMIN — HALOPERIDOL DECANOATE 1 MILLIGRAM(S): 100 INJECTION INTRAMUSCULAR at 00:43

## 2018-01-04 RX ADMIN — Medication 1000 UNIT(S): at 08:44

## 2018-01-04 RX ADMIN — FAMOTIDINE 20 MILLIGRAM(S): 10 INJECTION INTRAVENOUS at 08:44

## 2018-01-04 RX ADMIN — MIDODRINE HYDROCHLORIDE 5 MILLIGRAM(S): 2.5 TABLET ORAL at 21:53

## 2018-01-04 RX ADMIN — Medication 1 SPRAY(S): at 14:01

## 2018-01-04 RX ADMIN — HEPARIN SODIUM 5000 UNIT(S): 5000 INJECTION INTRAVENOUS; SUBCUTANEOUS at 14:01

## 2018-01-04 RX ADMIN — TAMSULOSIN HYDROCHLORIDE 0.4 MILLIGRAM(S): 0.4 CAPSULE ORAL at 21:53

## 2018-01-04 RX ADMIN — QUETIAPINE FUMARATE 25 MILLIGRAM(S): 200 TABLET, FILM COATED ORAL at 14:40

## 2018-01-04 RX ADMIN — Medication 1 TABLET(S): at 14:01

## 2018-01-04 RX ADMIN — POLYETHYLENE GLYCOL 3350 17 GRAM(S): 17 POWDER, FOR SOLUTION ORAL at 14:01

## 2018-01-04 RX ADMIN — HEPARIN SODIUM 5000 UNIT(S): 5000 INJECTION INTRAVENOUS; SUBCUTANEOUS at 08:44

## 2018-01-04 RX ADMIN — Medication 100 MILLIGRAM(S): at 21:53

## 2018-01-04 RX ADMIN — MIDODRINE HYDROCHLORIDE 5 MILLIGRAM(S): 2.5 TABLET ORAL at 14:01

## 2018-01-04 RX ADMIN — Medication 100 MILLIGRAM(S): at 08:44

## 2018-01-04 RX ADMIN — MEMANTINE HYDROCHLORIDE 10 MILLIGRAM(S): 10 TABLET ORAL at 21:53

## 2018-01-04 RX ADMIN — Medication 81 MILLIGRAM(S): at 14:01

## 2018-01-04 RX ADMIN — QUETIAPINE FUMARATE 25 MILLIGRAM(S): 200 TABLET, FILM COATED ORAL at 21:53

## 2018-01-04 RX ADMIN — MEMANTINE HYDROCHLORIDE 10 MILLIGRAM(S): 10 TABLET ORAL at 14:01

## 2018-01-04 RX ADMIN — MIDODRINE HYDROCHLORIDE 5 MILLIGRAM(S): 2.5 TABLET ORAL at 08:44

## 2018-01-04 RX ADMIN — HEPARIN SODIUM 5000 UNIT(S): 5000 INJECTION INTRAVENOUS; SUBCUTANEOUS at 21:53

## 2018-01-04 RX ADMIN — QUETIAPINE FUMARATE 25 MILLIGRAM(S): 200 TABLET, FILM COATED ORAL at 08:44

## 2018-01-04 RX ADMIN — HALOPERIDOL DECANOATE 2 MILLIGRAM(S): 100 INJECTION INTRAMUSCULAR at 22:10

## 2018-01-04 RX ADMIN — NYSTATIN CREAM 1 APPLICATION(S): 100000 CREAM TOPICAL at 08:47

## 2018-01-04 NOTE — PROGRESS NOTE ADULT - SUBJECTIVE AND OBJECTIVE BOX
MEDICINE, PROGRESS NOTE [phone]    EVELIN CHAVEZ 83y [id number]    Patient seen and examined.  Patient is a 83y old  Male who presents with a chief complaint of Loss of Consciousness with resultant fall (20 Dec 2017 11:35)  pt pleasantly confused was agitated today.    PAST MEDICAL & SURGICAL HISTORY:  Alzheimer disease: with psuedobulbular affect  BPH (benign prostatic hypertrophy)  Renal calculi  Hyperlipidemia  GERD (gastroesophageal reflux disease)  Nephrolithiasis  H/O cataract extraction, right  S/P tonsillectomy    MEDICATIONS  (STANDING):  aspirin enteric coated 81 milliGRAM(s) Oral daily  cholecalciferol 1000 Unit(s) Oral two times a day  docusate sodium 100 milliGRAM(s) Oral three times a day  famotidine    Tablet 20 milliGRAM(s) Oral two times a day  heparin  Injectable 5000 Unit(s) SubCutaneous every 8 hours  influenza   Vaccine 0.5 milliLiter(s) IntraMuscular once  memantine 10 milliGRAM(s) Oral daily  memantine 10 milliGRAM(s) Oral at bedtime  midodrine 5 milliGRAM(s) Oral three times a day  multivitamin 1 Tablet(s) Oral daily  nystatin Powder 1 Application(s) Topical two times a day  polyethylene glycol 3350 17 Gram(s) Oral daily  QUEtiapine 25 milliGRAM(s) Oral two times a day  sodium chloride 0.65% Nasal 1 Spray(s) Both Nostrils daily  tamsulosin 0.4 milliGRAM(s) Oral at bedtime    MEDICATIONS  (PRN):  acetaminophen   Tablet 650 milliGRAM(s) Oral every 6 hours PRN For Temp greater than 38 C (100.4 F)  ALBUTerol/ipratropium for Nebulization 3 milliLiter(s) Nebulizer every 6 hours PRN Shortness of Breath and/or Wheezing  aluminum hydroxide/magnesium hydroxide/simethicone Suspension 30 milliLiter(s) Oral every 6 hours PRN Dyspepsia  ondansetron Injectable 4 milliGRAM(s) IV Push every 6 hours PRN Nausea    Allergies    IV dye (Rash)  penicillin (Rash)    Intolerances        PHYSICAL EXAM:  Constitutional: NAD  HEENT: Normocephalic, EOMI  Neck:  No JVD  Respiratory: CTA B/L, No wheezes  Cardiovascular: S1, S2, RRR, + systolic murmur  Gastrointestinal: BS+, soft, NT/ND  Extremities: No peripheral edema  Neurological: AAOX1, no focal deficits  Psychiatric: Normal mood, normal affect  : No Barone    Vital Signs Last 24 Hrs  T(C): 36.7 (04 Jan 2018 12:11), Max: 36.7 (04 Jan 2018 12:11)  T(F): 98.1 (04 Jan 2018 12:11), Max: 98.1 (04 Jan 2018 12:11)  HR: 82 (04 Jan 2018 12:11) (82 - 82)  BP: 118/71 (04 Jan 2018 12:11) (118/71 - 136/82)  BP(mean): --  RR: 18 (04 Jan 2018 12:11) (18 - 18)  SpO2: 94% (04 Jan 2018 12:11) (94% - 95%)  I&O's Summary    03 Jan 2018 07:01  -  04 Jan 2018 07:00  --------------------------------------------------------  IN: 540 mL / OUT: 0 mL / NET: 540 mL    04 Jan 2018 07:01  -  04 Jan 2018 19:00  --------------------------------------------------------  IN: 480 mL / OUT: 0 mL / NET: 480 mL

## 2018-01-04 NOTE — PROGRESS NOTE ADULT - SUBJECTIVE AND OBJECTIVE BOX
- Patient seen and examined.  - In summary, patient is a 83y year old man who presented with Loss of Consciousness with resultant fall (20 Dec 2017 11:35)  - Today, patient is without complaints.         *****MEDICATIONS:    MEDICATIONS  (STANDING):  aspirin enteric coated 81 milliGRAM(s) Oral daily  cholecalciferol 1000 Unit(s) Oral two times a day  docusate sodium 100 milliGRAM(s) Oral three times a day  famotidine    Tablet 20 milliGRAM(s) Oral two times a day  heparin  Injectable 5000 Unit(s) SubCutaneous every 8 hours  influenza   Vaccine 0.5 milliLiter(s) IntraMuscular once  memantine 10 milliGRAM(s) Oral daily  memantine 10 milliGRAM(s) Oral at bedtime  midodrine 5 milliGRAM(s) Oral three times a day  multivitamin 1 Tablet(s) Oral daily  nystatin Powder 1 Application(s) Topical two times a day  polyethylene glycol 3350 17 Gram(s) Oral daily  QUEtiapine 25 milliGRAM(s) Oral two times a day  sodium chloride 0.65% Nasal 1 Spray(s) Both Nostrils daily  tamsulosin 0.4 milliGRAM(s) Oral at bedtime    MEDICATIONS  (PRN):  acetaminophen   Tablet 650 milliGRAM(s) Oral every 6 hours PRN For Temp greater than 38 C (100.4 F)  ALBUTerol/ipratropium for Nebulization 3 milliLiter(s) Nebulizer every 6 hours PRN Shortness of Breath and/or Wheezing  aluminum hydroxide/magnesium hydroxide/simethicone Suspension 30 milliLiter(s) Oral every 6 hours PRN Dyspepsia  ondansetron Injectable 4 milliGRAM(s) IV Push every 6 hours PRN Nausea               ***** REVIEW OF SYSTEM:  GEN: no fever, no chills, no pain  RESP: no SOB, no cough, no sputum  CVS: no chest pain, no palpitations, no edema  GI: no abdominal pain, no nausea, no vomiting, no constipation, no diarrhea  : no dysurea, no frequency  NEURO: no headache, no diziness  PSYCH: no depression, not anxious  Derm : no itching, no rash         ***** VITAL SIGNS:    T(F): 97.6 (01-04-18 @ 03:48), Max: 97.6 (01-04-18 @ 03:48)  HR: 82 (01-04-18 @ 03:48) (82 - 105)  BP: 136/82 (01-04-18 @ 03:48) (102/67 - 136/82)  RR: 18 (01-04-18 @ 03:48) (18 - 18)  SpO2: 95% (01-04-18 @ 03:48) (94% - 95%)  Wt(kg): --  ,   I&O's Summary    03 Jan 2018 07:01  -  04 Jan 2018 07:00  --------------------------------------------------------  IN: 540 mL / OUT: 0 mL / NET: 540 mL                 *****PHYSICAL EXAM:  GEN: A&O X 3 , NAD , comfortable  HEENT: NCAT, EOMI, MMM, no icterus  NECK: Supple, No JVD  CVS: S1S2 , regular , No M/R/G appreciated  PULM: CTA B/L,  no W/R/R appreciated  ABD.: soft. non tender, non distended,  bowel sounds present  Extrem: intact pulses , no edema noted  Derm: No rash or ecchymosis noted  PSYCH: normal mood, no depression, not anxious         *****LAB AND IMAGING:                                [All pertinent recent Imaging/Reports reviewed]  < from: Transthoracic Echocardiogram (12.21.17 @ 15:00) >  Conclusions:  1. Normal left ventricular internal dimensions and wall  thicknesses.  2. Normal left ventricular systolic function. No segmental  wall motion abnormalities.  3. Normal diastolic function  4. Normal right ventricular size and function.  *** Compared with echocardiogram of 6/27/2017, no  significant changes noted.    < end of copied text >         *****A S S E S S M E N T   A N D   P L A N :  83M viral URI, fall vs syncope  pt baseline dementia, unable to give details of episode  ILR with brief episodes of bradycardia as well as PAT/SVT  no events on tele  echo 6/17 as above  supportive tx for URI  VSS  DCP  awaiting rehab  f/u psych    __________________________  A. Fabi, D.O.

## 2018-01-04 NOTE — PROVIDER CONTACT NOTE (OTHER) - ASSESSMENT
pt A&Ox1, vss, pt agitated & climbing out of bed. Kicking & scratching. Refusing medications, requesting back rub.

## 2018-01-04 NOTE — PROVIDER CONTACT NOTE (OTHER) - ASSESSMENT
Pt very agitated despite having received Haldol IVP earlier for agitation. Pt continually trying to climb out of bed/out of his chair. Pt hits and kicks when RN and PCA attempt to reorient and prevent pt from falling.

## 2018-01-04 NOTE — CHART NOTE - NSCHARTNOTEFT_GEN_A_CORE
Called by RN for Patient with increased agitation with no relief from standing meds  Spoke with Dr mercado for directives in medication adjustments, he recommends additional dose of seroquel 25mg and adding haldol 2mg IV prn, with follow ekg for QTC monitoring  Discussed above with Dr Tafoya above [facility]  Medicine NP SAUL Brown [id number]

## 2018-01-04 NOTE — CHART NOTE - NSCHARTNOTEFT_GEN_A_CORE
Notified by RN - pt is very combative and repeatedly trying to get out of bed. Pt required IV haldol for agitation earlier in night but only calmed him for short period time. Pt seen and examined at bedside. Pt is A&Ox4. Pt responds to commands but repeatedly wants to get out of bed. RN very concerned for fall risk and combative behavior earlier requesting 1:1. Pt became calmer once seen and is now currently sleeping so will defer 1:1 for now. Will remained on enhanced supervision for fall risk. IV Haldol prn q8 hrs recommended by psych per day team. Will f/u primary team in AM and can reconsider 1:1 if pt becomes agitated again. Monitor QTc. Will continue to monitor.    Wilber Booker PA-C  Department of Medicine  #[id number] Notified by RN - pt is very combative and repeatedly trying to get out of bed. Pt required IV haldol for agitation earlier in night but only calmed him for short period time. Pt seen and examined at bedside. Pt is A&Ox4. Pt responds to commands but repeatedly wants to get out of bed. RN very concerned for fall risk and combative behavior earlier requesting 1:1. Pt became calmer once seen and is now currently sleeping so will defer 1:1 for now. Will remained on enhanced supervision for fall risk. IV Haldol prn q8 hrs recommended by psych per day team. Continue seroquel. Will f/u primary team in AM and can reconsider 1:1 if pt becomes agitated again. Monitor QTc. Will continue to monitor.    Wilber Booker PA-C  Department of Medicine  #[id number]

## 2018-01-04 NOTE — PROVIDER CONTACT NOTE (OTHER) - BACKGROUND
Pt with history of falls and dementia with agitation, confused at baseline. Admitted on 12/20/17 after an unwitnessed fall at home.

## 2018-01-05 PROCEDURE — 93010 ELECTROCARDIOGRAM REPORT: CPT

## 2018-01-05 RX ORDER — QUETIAPINE FUMARATE 200 MG/1
25 TABLET, FILM COATED ORAL THREE TIMES A DAY
Qty: 0 | Refills: 0 | Status: DISCONTINUED | OUTPATIENT
Start: 2018-01-05 | End: 2018-01-08

## 2018-01-05 RX ADMIN — Medication 1 SPRAY(S): at 12:20

## 2018-01-05 RX ADMIN — MEMANTINE HYDROCHLORIDE 10 MILLIGRAM(S): 10 TABLET ORAL at 12:16

## 2018-01-05 RX ADMIN — Medication 100 MILLIGRAM(S): at 14:11

## 2018-01-05 RX ADMIN — MIDODRINE HYDROCHLORIDE 5 MILLIGRAM(S): 2.5 TABLET ORAL at 14:11

## 2018-01-05 RX ADMIN — NYSTATIN CREAM 1 APPLICATION(S): 100000 CREAM TOPICAL at 18:24

## 2018-01-05 RX ADMIN — NYSTATIN CREAM 1 APPLICATION(S): 100000 CREAM TOPICAL at 05:25

## 2018-01-05 RX ADMIN — FAMOTIDINE 20 MILLIGRAM(S): 10 INJECTION INTRAVENOUS at 05:31

## 2018-01-05 RX ADMIN — QUETIAPINE FUMARATE 25 MILLIGRAM(S): 200 TABLET, FILM COATED ORAL at 22:12

## 2018-01-05 RX ADMIN — QUETIAPINE FUMARATE 25 MILLIGRAM(S): 200 TABLET, FILM COATED ORAL at 12:15

## 2018-01-05 RX ADMIN — TAMSULOSIN HYDROCHLORIDE 0.4 MILLIGRAM(S): 0.4 CAPSULE ORAL at 22:12

## 2018-01-05 RX ADMIN — Medication 81 MILLIGRAM(S): at 12:16

## 2018-01-05 RX ADMIN — HEPARIN SODIUM 5000 UNIT(S): 5000 INJECTION INTRAVENOUS; SUBCUTANEOUS at 05:30

## 2018-01-05 RX ADMIN — MEMANTINE HYDROCHLORIDE 10 MILLIGRAM(S): 10 TABLET ORAL at 22:12

## 2018-01-05 RX ADMIN — HEPARIN SODIUM 5000 UNIT(S): 5000 INJECTION INTRAVENOUS; SUBCUTANEOUS at 22:11

## 2018-01-05 RX ADMIN — Medication 1 TABLET(S): at 12:16

## 2018-01-05 RX ADMIN — FAMOTIDINE 20 MILLIGRAM(S): 10 INJECTION INTRAVENOUS at 18:23

## 2018-01-05 RX ADMIN — Medication 1000 UNIT(S): at 05:31

## 2018-01-05 RX ADMIN — Medication 1000 UNIT(S): at 18:23

## 2018-01-05 RX ADMIN — POLYETHYLENE GLYCOL 3350 17 GRAM(S): 17 POWDER, FOR SOLUTION ORAL at 12:16

## 2018-01-05 RX ADMIN — Medication 100 MILLIGRAM(S): at 22:12

## 2018-01-05 RX ADMIN — HEPARIN SODIUM 5000 UNIT(S): 5000 INJECTION INTRAVENOUS; SUBCUTANEOUS at 14:11

## 2018-01-05 RX ADMIN — MIDODRINE HYDROCHLORIDE 5 MILLIGRAM(S): 2.5 TABLET ORAL at 05:31

## 2018-01-05 RX ADMIN — Medication 100 MILLIGRAM(S): at 05:31

## 2018-01-05 RX ADMIN — MIDODRINE HYDROCHLORIDE 5 MILLIGRAM(S): 2.5 TABLET ORAL at 22:12

## 2018-01-05 NOTE — PROGRESS NOTE ADULT - SUBJECTIVE AND OBJECTIVE BOX
- Patient seen and examined.  - In summary, patient is a 83y year old man who presented with Loss of Consciousness with resultant fall (20 Dec 2017 11:35)  - Today, patient is without complaints.         *****MEDICATIONS:    MEDICATIONS  (STANDING):  aspirin enteric coated 81 milliGRAM(s) Oral daily  cholecalciferol 1000 Unit(s) Oral two times a day  docusate sodium 100 milliGRAM(s) Oral three times a day  famotidine    Tablet 20 milliGRAM(s) Oral two times a day  heparin  Injectable 5000 Unit(s) SubCutaneous every 8 hours  influenza   Vaccine 0.5 milliLiter(s) IntraMuscular once  memantine 10 milliGRAM(s) Oral daily  memantine 10 milliGRAM(s) Oral at bedtime  midodrine 5 milliGRAM(s) Oral three times a day  multivitamin 1 Tablet(s) Oral daily  nystatin Powder 1 Application(s) Topical two times a day  polyethylene glycol 3350 17 Gram(s) Oral daily  QUEtiapine 25 milliGRAM(s) Oral two times a day  sodium chloride 0.65% Nasal 1 Spray(s) Both Nostrils daily  tamsulosin 0.4 milliGRAM(s) Oral at bedtime    MEDICATIONS  (PRN):  acetaminophen   Tablet 650 milliGRAM(s) Oral every 6 hours PRN For Temp greater than 38 C (100.4 F)  ALBUTerol/ipratropium for Nebulization 3 milliLiter(s) Nebulizer every 6 hours PRN Shortness of Breath and/or Wheezing  aluminum hydroxide/magnesium hydroxide/simethicone Suspension 30 milliLiter(s) Oral every 6 hours PRN Dyspepsia  ondansetron Injectable 4 milliGRAM(s) IV Push every 6 hours PRN Nausea                 ***** REVIEW OF SYSTEM:  GEN: no fever, no chills, no pain  RESP: no SOB, no cough, no sputum  CVS: no chest pain, no palpitations, no edema  GI: no abdominal pain, no nausea, no vomiting, no constipation, no diarrhea  : no dysurea, no frequency  NEURO: no headache, no diziness  PSYCH: no depression, not anxious  Derm : no itching, no rash         ***** VITAL SIGNS:    T(F): 98 (01-05-18 @ 04:33), Max: 98.1 (01-04-18 @ 12:11)  HR: 84 (01-05-18 @ 04:33) (74 - 84)  BP: 109/67 (01-05-18 @ 04:33) (109/67 - 118/71)  RR: 18 (01-05-18 @ 04:33) (18 - 18)  SpO2: 97% (01-05-18 @ 04:33) (94% - 97%)  Wt(kg): --  ,   I&O's Summary    04 Jan 2018 07:01  -  05 Jan 2018 07:00  --------------------------------------------------------  IN: 795 mL / OUT: 300 mL / NET: 495 mL                   *****PHYSICAL EXAM:  GEN: A&O X 3 , NAD , comfortable  HEENT: NCAT, EOMI, MMM, no icterus  NECK: Supple, No JVD  CVS: S1S2 , regular , No M/R/G appreciated  PULM: CTA B/L,  no W/R/R appreciated  ABD.: soft. non tender, non distended,  bowel sounds present  Extrem: intact pulses , no edema noted  Derm: No rash or ecchymosis noted  PSYCH: normal mood, no depression, not anxious         *****LAB AND IMAGING:                                [All pertinent recent Imaging/Reports reviewed]  < from: Transthoracic Echocardiogram (12.21.17 @ 15:00) >  Conclusions:  1. Normal left ventricular internal dimensions and wall  thicknesses.  2. Normal left ventricular systolic function. No segmental  wall motion abnormalities.  3. Normal diastolic function  4. Normal right ventricular size and function.  *** Compared with echocardiogram of 6/27/2017, no  significant changes noted.    < end of copied text >         *****A S S E S S M E N T   A N D   P L A N :  83M viral URI, fall vs syncope  pt baseline dementia, unable to give details of episode  ILR with brief episodes of bradycardia as well as PAT/SVT  no events on tele  echo 6/17 as above  supportive tx for URI  VSS  f/u psych  DCP  awaiting rehab    __________________________  A. Fabi, D.O.

## 2018-01-05 NOTE — PROGRESS NOTE ADULT - SUBJECTIVE AND OBJECTIVE BOX
Events noted. Required Haldol 2mg last night. Sleeping now. Nursing aid tells me pt. is less agitated today, eats well, and walks with assistance.       Vital Signs Last 24 Hrs  T(C): 36.7 (05 Jan 2018 04:33), Max: 36.7 (04 Jan 2018 21:08)  T(F): 98 (05 Jan 2018 04:33), Max: 98 (04 Jan 2018 21:08)  HR: 84 (05 Jan 2018 04:33) (74 - 84)  BP: 109/67 (05 Jan 2018 04:33) (109/67 - 115/74)  BP(mean): --  RR: 18 (05 Jan 2018 04:33) (18 - 18)  SpO2: 97% (05 Jan 2018 04:33) (95% - 97%)                        MEDICATIONS  (STANDING):  aspirin enteric coated 81 milliGRAM(s) Oral daily  cholecalciferol 1000 Unit(s) Oral two times a day  docusate sodium 100 milliGRAM(s) Oral three times a day  famotidine    Tablet 20 milliGRAM(s) Oral two times a day  heparin  Injectable 5000 Unit(s) SubCutaneous every 8 hours  influenza   Vaccine 0.5 milliLiter(s) IntraMuscular once  memantine 10 milliGRAM(s) Oral daily  memantine 10 milliGRAM(s) Oral at bedtime  midodrine 5 milliGRAM(s) Oral three times a day  multivitamin 1 Tablet(s) Oral daily  nystatin Powder 1 Application(s) Topical two times a day  polyethylene glycol 3350 17 Gram(s) Oral daily  QUEtiapine 25 milliGRAM(s) Oral two times a day  sodium chloride 0.65% Nasal 1 Spray(s) Both Nostrils daily  tamsulosin 0.4 milliGRAM(s) Oral at bedtime    MEDICATIONS  (PRN):  acetaminophen   Tablet 650 milliGRAM(s) Oral every 6 hours PRN For Temp greater than 38 C (100.4 F)  ALBUTerol/ipratropium for Nebulization 3 milliLiter(s) Nebulizer every 6 hours PRN Shortness of Breath and/or Wheezing  aluminum hydroxide/magnesium hydroxide/simethicone Suspension 30 milliLiter(s) Oral every 6 hours PRN Dyspepsia  ondansetron Injectable 4 milliGRAM(s) IV Push every 6 hours PRN Nausea      Elderly WM in chair asleep. Does not open eyes to my voice.

## 2018-01-05 NOTE — PROGRESS NOTE ADULT - SUBJECTIVE AND OBJECTIVE BOX
MEDICINE, PROGRESS NOTE [phone]    EVELIN CHAVEZ 83y [id number]    Patient seen and examined.  Patient is a 83y old  Male who presents with a chief complaint of Loss of Consciousness with resultant fall (20 Dec 2017 11:35)  Pt pleasantly confused.    PAST MEDICAL & SURGICAL HISTORY:  Alzheimer disease: with psuedobulbular affect  BPH (benign prostatic hypertrophy)  Renal calculi  Hyperlipidemia  GERD (gastroesophageal reflux disease)  Nephrolithiasis  H/O cataract extraction, right  S/P tonsillectomy    MEDICATIONS  (STANDING):  aspirin enteric coated 81 milliGRAM(s) Oral daily  cholecalciferol 1000 Unit(s) Oral two times a day  docusate sodium 100 milliGRAM(s) Oral three times a day  famotidine    Tablet 20 milliGRAM(s) Oral two times a day  heparin  Injectable 5000 Unit(s) SubCutaneous every 8 hours  influenza   Vaccine 0.5 milliLiter(s) IntraMuscular once  memantine 10 milliGRAM(s) Oral daily  memantine 10 milliGRAM(s) Oral at bedtime  midodrine 5 milliGRAM(s) Oral three times a day  multivitamin 1 Tablet(s) Oral daily  nystatin Powder 1 Application(s) Topical two times a day  polyethylene glycol 3350 17 Gram(s) Oral daily  QUEtiapine 25 milliGRAM(s) Oral two times a day  sodium chloride 0.65% Nasal 1 Spray(s) Both Nostrils daily  tamsulosin 0.4 milliGRAM(s) Oral at bedtime    MEDICATIONS  (PRN):  acetaminophen   Tablet 650 milliGRAM(s) Oral every 6 hours PRN For Temp greater than 38 C (100.4 F)  ALBUTerol/ipratropium for Nebulization 3 milliLiter(s) Nebulizer every 6 hours PRN Shortness of Breath and/or Wheezing  aluminum hydroxide/magnesium hydroxide/simethicone Suspension 30 milliLiter(s) Oral every 6 hours PRN Dyspepsia  ondansetron Injectable 4 milliGRAM(s) IV Push every 6 hours PRN Nausea    Allergies    IV dye (Rash)  penicillin (Rash)    Intolerances        PHYSICAL EXAM:  Constitutional: NAD  HEENT: Normocephalic, EOMI  Neck:  No JVD  Respiratory: CTA B/L, No wheezes  Cardiovascular: S1, S2, RRR, + systolic murmur  Gastrointestinal: BS+, soft, NT/ND  Extremities: No peripheral edema  Neurological: AAOX2, no focal deficits  Psychiatric: Normal mood, normal affect  : No Barone    Vital Signs Last 24 Hrs  T(C): 36.3 (05 Jan 2018 14:44), Max: 36.7 (04 Jan 2018 21:08)  T(F): 97.3 (05 Jan 2018 14:44), Max: 98 (04 Jan 2018 21:08)  HR: 94 (05 Jan 2018 14:44) (74 - 94)  BP: 113/81 (05 Jan 2018 14:44) (109/67 - 115/74)  BP(mean): --  RR: 17 (05 Jan 2018 14:44) (17 - 18)  SpO2: 97% (05 Jan 2018 14:44) (95% - 97%)  I&O's Summary    04 Jan 2018 07:01  -  05 Jan 2018 07:00  --------------------------------------------------------  IN: 795 mL / OUT: 300 mL / NET: 495 mL

## 2018-01-05 NOTE — CHART NOTE - NSCHARTNOTEFT_GEN_A_CORE
Source: Patient [ ]    Family [ ]     other [ X] RN, PCA, Medical record     Pt seen for nutrition follow up. Pt sitting in chair sleeping. Per chart Pt has been very agitated, PCA request I do not wake Pt up. Per PCA Pt eats well, usually 100% of meals. No noted GI distress.     Pt admitted for s/p fall. Pt found with right 9th Rx non displaced Fx with no need for surgical intervention. Pt with viral URI and alzheimer dementia. Pt being followed by psych for agitation. Discharge planning.     Diet : DASH      Patient reports no GI distress     PO intake:  100%     Source for PO intake : staff and chart      Current Weight: 153.8lbs. No new Wt. RN and PVA made aware.   % Weight Change    Pertinent Medications: MEDICATIONS  (STANDING):  aspirin enteric coated 81 milliGRAM(s) Oral daily  cholecalciferol 1000 Unit(s) Oral two times a day  docusate sodium 100 milliGRAM(s) Oral three times a day  famotidine    Tablet 20 milliGRAM(s) Oral two times a day  heparin  Injectable 5000 Unit(s) SubCutaneous every 8 hours  influenza   Vaccine 0.5 milliLiter(s) IntraMuscular once  memantine 10 milliGRAM(s) Oral daily  memantine 10 milliGRAM(s) Oral at bedtime  midodrine 5 milliGRAM(s) Oral three times a day  multivitamin 1 Tablet(s) Oral daily  nystatin Powder 1 Application(s) Topical two times a day  polyethylene glycol 3350 17 Gram(s) Oral daily  QUEtiapine 25 milliGRAM(s) Oral two times a day  sodium chloride 0.65% Nasal 1 Spray(s) Both Nostrils daily  tamsulosin 0.4 milliGRAM(s) Oral at bedtime    MEDICATIONS  (PRN):  acetaminophen   Tablet 650 milliGRAM(s) Oral every 6 hours PRN For Temp greater than 38 C (100.4 F)  ALBUTerol/ipratropium for Nebulization 3 milliLiter(s) Nebulizer every 6 hours PRN Shortness of Breath and/or Wheezing  aluminum hydroxide/magnesium hydroxide/simethicone Suspension 30 milliLiter(s) Oral every 6 hours PRN Dyspepsia  ondansetron Injectable 4 milliGRAM(s) IV Push every 6 hours PRN Nausea    Pertinent Labs:  None     Skin: intact, +1 yamila edema     Estimated Needs:   [X ] no change since previous assessment  [ ] recalculated:       Previous Nutrition Diagnosis: None            New Nutrition Diagnosis: [X ] not applicable    Recommend    1. Provide food preferences as requested by Pt/family within diet restrictions    2. Encourage PO intake during meal times   3. Provide feeding assistance as medically feasible      Monitoring and Evaluation:     [ X] PO intake [ X] Tolerance to diet prescription [ X] weights [ X] follow up per protocol    [X ] other: RD remains available Sarah Siegler RD. Pager #[phone]

## 2018-01-06 LAB
ANION GAP SERPL CALC-SCNC: 15 MMOL/L — SIGNIFICANT CHANGE UP (ref 5–17)
BASOPHILS # BLD AUTO: 0.03 K/UL — SIGNIFICANT CHANGE UP (ref 0–0.2)
BASOPHILS NFR BLD AUTO: 0.3 % — SIGNIFICANT CHANGE UP (ref 0–2)
BUN SERPL-MCNC: 17 MG/DL — SIGNIFICANT CHANGE UP (ref 7–23)
CALCIUM SERPL-MCNC: 9.9 MG/DL — SIGNIFICANT CHANGE UP (ref 8.4–10.5)
CHLORIDE SERPL-SCNC: 102 MMOL/L — SIGNIFICANT CHANGE UP (ref 96–108)
CO2 SERPL-SCNC: 24 MMOL/L — SIGNIFICANT CHANGE UP (ref 22–31)
CREAT SERPL-MCNC: 1.04 MG/DL — SIGNIFICANT CHANGE UP (ref 0.5–1.3)
EOSINOPHIL # BLD AUTO: 0.04 K/UL — SIGNIFICANT CHANGE UP (ref 0–0.5)
EOSINOPHIL NFR BLD AUTO: 0.4 % — SIGNIFICANT CHANGE UP (ref 0–6)
GLUCOSE SERPL-MCNC: 108 MG/DL — HIGH (ref 70–99)
HCT VFR BLD CALC: 43.8 % — SIGNIFICANT CHANGE UP (ref 39–50)
HGB BLD-MCNC: 14.9 G/DL — SIGNIFICANT CHANGE UP (ref 13–17)
IMM GRANULOCYTES NFR BLD AUTO: 0.3 % — SIGNIFICANT CHANGE UP (ref 0–1.5)
LYMPHOCYTES # BLD AUTO: 2.23 K/UL — SIGNIFICANT CHANGE UP (ref 1–3.3)
LYMPHOCYTES # BLD AUTO: 24.9 % — SIGNIFICANT CHANGE UP (ref 13–44)
MCHC RBC-ENTMCNC: 34 GM/DL — SIGNIFICANT CHANGE UP (ref 32–36)
MCHC RBC-ENTMCNC: 34 PG — SIGNIFICANT CHANGE UP (ref 27–34)
MCV RBC AUTO: 100 FL — SIGNIFICANT CHANGE UP (ref 80–100)
MONOCYTES # BLD AUTO: 1.01 K/UL — HIGH (ref 0–0.9)
MONOCYTES NFR BLD AUTO: 11.3 % — SIGNIFICANT CHANGE UP (ref 2–14)
NEUTROPHILS # BLD AUTO: 5.63 K/UL — SIGNIFICANT CHANGE UP (ref 1.8–7.4)
NEUTROPHILS NFR BLD AUTO: 62.8 % — SIGNIFICANT CHANGE UP (ref 43–77)
PLATELET # BLD AUTO: 241 K/UL — SIGNIFICANT CHANGE UP (ref 150–400)
POTASSIUM SERPL-MCNC: 4.1 MMOL/L — SIGNIFICANT CHANGE UP (ref 3.5–5.3)
POTASSIUM SERPL-SCNC: 4.1 MMOL/L — SIGNIFICANT CHANGE UP (ref 3.5–5.3)
RBC # BLD: 4.38 M/UL — SIGNIFICANT CHANGE UP (ref 4.2–5.8)
RBC # FLD: 14.1 % — SIGNIFICANT CHANGE UP (ref 10.3–14.5)
SODIUM SERPL-SCNC: 141 MMOL/L — SIGNIFICANT CHANGE UP (ref 135–145)
WBC # BLD: 8.97 K/UL — SIGNIFICANT CHANGE UP (ref 3.8–10.5)
WBC # FLD AUTO: 8.97 K/UL — SIGNIFICANT CHANGE UP (ref 3.8–10.5)

## 2018-01-06 PROCEDURE — 93010 ELECTROCARDIOGRAM REPORT: CPT

## 2018-01-06 RX ADMIN — HEPARIN SODIUM 5000 UNIT(S): 5000 INJECTION INTRAVENOUS; SUBCUTANEOUS at 13:52

## 2018-01-06 RX ADMIN — FAMOTIDINE 20 MILLIGRAM(S): 10 INJECTION INTRAVENOUS at 17:34

## 2018-01-06 RX ADMIN — MEMANTINE HYDROCHLORIDE 10 MILLIGRAM(S): 10 TABLET ORAL at 21:50

## 2018-01-06 RX ADMIN — MIDODRINE HYDROCHLORIDE 5 MILLIGRAM(S): 2.5 TABLET ORAL at 21:50

## 2018-01-06 RX ADMIN — Medication 1 TABLET(S): at 13:51

## 2018-01-06 RX ADMIN — QUETIAPINE FUMARATE 25 MILLIGRAM(S): 200 TABLET, FILM COATED ORAL at 06:00

## 2018-01-06 RX ADMIN — NYSTATIN CREAM 1 APPLICATION(S): 100000 CREAM TOPICAL at 17:35

## 2018-01-06 RX ADMIN — FAMOTIDINE 20 MILLIGRAM(S): 10 INJECTION INTRAVENOUS at 13:51

## 2018-01-06 RX ADMIN — MEMANTINE HYDROCHLORIDE 10 MILLIGRAM(S): 10 TABLET ORAL at 17:34

## 2018-01-06 RX ADMIN — FAMOTIDINE 20 MILLIGRAM(S): 10 INJECTION INTRAVENOUS at 06:00

## 2018-01-06 RX ADMIN — Medication 100 MILLIGRAM(S): at 06:01

## 2018-01-06 RX ADMIN — HEPARIN SODIUM 5000 UNIT(S): 5000 INJECTION INTRAVENOUS; SUBCUTANEOUS at 21:50

## 2018-01-06 RX ADMIN — Medication 1000 UNIT(S): at 17:34

## 2018-01-06 RX ADMIN — Medication 100 MILLIGRAM(S): at 13:52

## 2018-01-06 RX ADMIN — HEPARIN SODIUM 5000 UNIT(S): 5000 INJECTION INTRAVENOUS; SUBCUTANEOUS at 06:00

## 2018-01-06 RX ADMIN — Medication 81 MILLIGRAM(S): at 13:51

## 2018-01-06 RX ADMIN — Medication 1 SPRAY(S): at 13:52

## 2018-01-06 RX ADMIN — NYSTATIN CREAM 1 APPLICATION(S): 100000 CREAM TOPICAL at 05:59

## 2018-01-06 RX ADMIN — MIDODRINE HYDROCHLORIDE 5 MILLIGRAM(S): 2.5 TABLET ORAL at 06:00

## 2018-01-06 RX ADMIN — QUETIAPINE FUMARATE 25 MILLIGRAM(S): 200 TABLET, FILM COATED ORAL at 21:50

## 2018-01-06 RX ADMIN — QUETIAPINE FUMARATE 25 MILLIGRAM(S): 200 TABLET, FILM COATED ORAL at 13:51

## 2018-01-06 RX ADMIN — Medication 1000 UNIT(S): at 06:00

## 2018-01-06 RX ADMIN — NYSTATIN CREAM 1 APPLICATION(S): 100000 CREAM TOPICAL at 13:52

## 2018-01-06 RX ADMIN — TAMSULOSIN HYDROCHLORIDE 0.4 MILLIGRAM(S): 0.4 CAPSULE ORAL at 21:50

## 2018-01-06 RX ADMIN — MIDODRINE HYDROCHLORIDE 5 MILLIGRAM(S): 2.5 TABLET ORAL at 13:51

## 2018-01-06 RX ADMIN — Medication 100 MILLIGRAM(S): at 21:50

## 2018-01-06 NOTE — PROGRESS NOTE ADULT - SUBJECTIVE AND OBJECTIVE BOX
Patient is a 83y old  Male who presents with a chief complaint of Loss of Consciousness with resultant fall (20 Dec 2017 11:35)      SUBJECTIVE / OVERNIGHT EVENTS:  No dizziness  Review of Systems:   CONSTITUTIONAL: No fever, weight loss, or fatigue  EYES: No eye pain, visual disturbances, or discharge  ENMT:  No difficulty hearing, tinnitus, vertigo; No sinus or throat pain  NECK: No pain or stiffness  BREASTS: No pain, masses, or nipple discharge  RESPIRATORY: No cough, wheezing, chills or hemoptysis; No shortness of breath  CARDIOVASCULAR: No chest pain, palpitations, dizziness, or leg swelling  GASTROINTESTINAL: No abdominal or epigastric pain. No nausea, vomiting, or hematemesis; No diarrhea or constipation. No melena or hematochezia.  GENITOURINARY: No dysuria, frequency, hematuria, or incontinence  NEUROLOGICAL: No headaches, memory loss, loss of strength, numbness, or tremors  SKIN: No itching, burning, rashes, or lesions   LYMPH NODES: No enlarged glands  ENDOCRINE: No heat or cold intolerance; No hair loss  MUSCULOSKELETAL: No joint pain or swelling; No muscle, back, or extremity pain  PSYCHIATRIC: No depression, anxiety, mood swings, or difficulty sleeping  HEME/LYMPH: No easy bruising, or bleeding gums  ALLERY AND IMMUNOLOGIC: No hives or eczema    MEDICATIONS  (STANDING):  aspirin enteric coated 81 milliGRAM(s) Oral daily  cholecalciferol 1000 Unit(s) Oral two times a day  docusate sodium 100 milliGRAM(s) Oral three times a day  famotidine    Tablet 20 milliGRAM(s) Oral two times a day  heparin  Injectable 5000 Unit(s) SubCutaneous every 8 hours  influenza   Vaccine 0.5 milliLiter(s) IntraMuscular once  memantine 10 milliGRAM(s) Oral daily  memantine 10 milliGRAM(s) Oral at bedtime  midodrine 5 milliGRAM(s) Oral three times a day  multivitamin 1 Tablet(s) Oral daily  nystatin Powder 1 Application(s) Topical two times a day  polyethylene glycol 3350 17 Gram(s) Oral daily  QUEtiapine 25 milliGRAM(s) Oral three times a day  sodium chloride 0.65% Nasal 1 Spray(s) Both Nostrils daily  tamsulosin 0.4 milliGRAM(s) Oral at bedtime    MEDICATIONS  (PRN):  acetaminophen   Tablet 650 milliGRAM(s) Oral every 6 hours PRN For Temp greater than 38 C (100.4 F)  ALBUTerol/ipratropium for Nebulization 3 milliLiter(s) Nebulizer every 6 hours PRN Shortness of Breath and/or Wheezing  aluminum hydroxide/magnesium hydroxide/simethicone Suspension 30 milliLiter(s) Oral every 6 hours PRN Dyspepsia  ondansetron Injectable 4 milliGRAM(s) IV Push every 6 hours PRN Nausea      PHYSICAL EXAM:  Vital Signs Last 24 Hrs  T(C): 36.8 (06 Jan 2018 11:23), Max: 36.8 (06 Jan 2018 11:23)  T(F): 98.2 (06 Jan 2018 11:23), Max: 98.2 (06 Jan 2018 11:23)  HR: 89 (06 Jan 2018 11:23) (89 - 100)  BP: 127/87 (06 Jan 2018 11:23) (100/66 - 135/72)  BP(mean): --  RR: 18 (06 Jan 2018 11:23) (18 - 18)  SpO2: 97% (06 Jan 2018 11:23) (95% - 97%)  I&O's Summary    05 Jan 2018 07:01  -  06 Jan 2018 07:00  --------------------------------------------------------  IN: 720 mL / OUT: 0 mL / NET: 720 mL    06 Jan 2018 07:01  -  06 Jan 2018 19:58  --------------------------------------------------------  IN: 700 mL / OUT: 300 mL / NET: 400 mL      GENERAL: NAD, well-developed  HEAD:  Atraumatic, Normocephalic  EYES: EOMI, PERRLA, conjunctiva and sclera clear  NECK: Supple, No JVD  CHEST/LUNG: Clear to auscultation bilaterally; No wheeze  HEART: Regular rate and rhythm; No murmurs, rubs, or gallops  ABDOMEN: Soft, Nontender, Nondistended; Bowel sounds present  EXTREMITIES:  2+ Peripheral Pulses, No clubbing, cyanosis, or edema  PSYCH: AAOx3  NEUROLOGY: non-focal  SKIN: No rashes or lesions    LABS:  CAPILLARY BLOOD GLUCOSE                              14.9   8.97  )-----------( 241      ( 06 Jan 2018 08:33 )             43.8     01-06    141  |  102  |  17  ----------------------------<  108<H>  4.1   |  24  |  1.04    Ca    9.9      06 Jan 2018 08:33                RADIOLOGY & ADDITIONAL TESTS:    Imaging Personally Reviewed:    Consultant(s) Notes Reviewed:      Care Discussed with Consultants/Other Providers:

## 2018-01-06 NOTE — PROGRESS NOTE ADULT - SUBJECTIVE AND OBJECTIVE BOX
CARDIOLOGY     PROGRESS  NOTE   ________________________________________________    CHIEF COMPLAINT:Patient is a 83y old  Male who presents with a chief complaint of Loss of Consciousness with resultant fall (20 Dec 2017 11:35)  doing well.  	  REVIEW OF SYSTEMS:  CONSTITUTIONAL: No fever, weight loss, or fatigue  EYES: No eye pain, visual disturbances, or discharge  ENT:  No difficulty hearing, tinnitus, vertigo; No sinus or throat pain  NECK: No pain or stiffness  RESPIRATORY: No cough, wheezing, chills or hemoptysis; No Shortness of Breath  CARDIOVASCULAR: No chest pain, palpitations, passing out, dizziness, or leg swelling  GASTROINTESTINAL: No abdominal or epigastric pain. No nausea, vomiting, or hematemesis; No diarrhea or constipation. No melena or hematochezia.  GENITOURINARY: No dysuria, frequency, hematuria, or incontinence  NEUROLOGICAL: No headaches, memory loss, loss of strength, numbness, or tremors  SKIN: No itching, burning, rashes, or lesions   LYMPH Nodes: No enlarged glands  ENDOCRINE: No heat or cold intolerance; No hair loss  MUSCULOSKELETAL: No joint pain or swelling; No muscle, back, or extremity pain  PSYCHIATRIC: No depression, anxiety, mood swings, or difficulty sleeping  HEME/LYMPH: No easy bruising, or bleeding gums  ALLERGY AND IMMUNOLOGIC: No hives or eczema	    [ ] All others negative	  [ ] Unable to obtain    PHYSICAL EXAM:  T(C): 36.8 (01-06-18 @ 11:23), Max: 36.8 (01-06-18 @ 11:23)  HR: 89 (01-06-18 @ 11:23) (89 - 100)  BP: 127/87 (01-06-18 @ 11:23) (100/66 - 135/72)  RR: 18 (01-06-18 @ 11:23) (17 - 18)  SpO2: 97% (01-06-18 @ 11:23) (95% - 97%)  Wt(kg): --  I&O's Summary    05 Jan 2018 07:01  -  06 Jan 2018 07:00  --------------------------------------------------------  IN: 720 mL / OUT: 0 mL / NET: 720 mL    06 Jan 2018 07:01  -  06 Jan 2018 11:38  --------------------------------------------------------  IN: 240 mL / OUT: 0 mL / NET: 240 mL        Appearance: Normal	  HEENT:   Normal oral mucosa, PERRL, EOMI	  Lymphatic: No lymphadenopathy  Cardiovascular: Normal S1 S2, No JVD, No murmurs, No edema  Respiratory: Lungs clear to auscultation	  Psychiatry: A & O x 3, Mood & affect appropriate  Gastrointestinal:  Soft, Non-tender, + BS	  Skin: No rashes, No ecchymoses, No cyanosis	  Neurologic: Non-focal  Extremities: Normal range of motion, No clubbing, cyanosis or edema  Vascular: Peripheral pulses palpable 2+ bilaterally    MEDICATIONS  (STANDING):  aspirin enteric coated 81 milliGRAM(s) Oral daily  cholecalciferol 1000 Unit(s) Oral two times a day  docusate sodium 100 milliGRAM(s) Oral three times a day  famotidine    Tablet 20 milliGRAM(s) Oral two times a day  heparin  Injectable 5000 Unit(s) SubCutaneous every 8 hours  influenza   Vaccine 0.5 milliLiter(s) IntraMuscular once  memantine 10 milliGRAM(s) Oral daily  memantine 10 milliGRAM(s) Oral at bedtime  midodrine 5 milliGRAM(s) Oral three times a day  multivitamin 1 Tablet(s) Oral daily  nystatin Powder 1 Application(s) Topical two times a day  polyethylene glycol 3350 17 Gram(s) Oral daily  QUEtiapine 25 milliGRAM(s) Oral three times a day  sodium chloride 0.65% Nasal 1 Spray(s) Both Nostrils daily  tamsulosin 0.4 milliGRAM(s) Oral at bedtime      TELEMETRY: 	    ECG:  	  RADIOLOGY:  OTHER: 	  	  LABS:	 	    CARDIAC MARKERS:                                14.9   8.97  )-----------( 241      ( 06 Jan 2018 08:33 )             43.8     01-06    141  |  102  |  17  ----------------------------<  108<H>  4.1   |  24  |  1.04    Ca    9.9      06 Jan 2018 08:33      proBNP: Serum Pro-Brain Natriuretic Peptide: 114 pg/mL (12-20 @ 04:25)    Lipid Profile:   HgA1c:   TSH: Thyroid Stimulating Hormone, Serum: 1.89 uIU/mL (12-21 @ 07:36)          Assessment and plan  ---------------------------  bp is better controlled if still orthostatic will add florinef in am  dvt prophylaxis  continue current meds.

## 2018-01-06 NOTE — PROGRESS NOTE ADULT - SUBJECTIVE AND OBJECTIVE BOX
Events noted. PA denies agitation overnight and today. On Seroquel 25mg tid and tolerating it. A few days ago the pt. tried kicking staff and is calmer now on higher dose Seroquel.       Vital Signs Last 24 Hrs  T(C): 36.8 (06 Jan 2018 11:23), Max: 36.8 (06 Jan 2018 11:23)  T(F): 98.2 (06 Jan 2018 11:23), Max: 98.2 (06 Jan 2018 11:23)  HR: 89 (06 Jan 2018 11:23) (89 - 100)  BP: 127/87 (06 Jan 2018 11:23) (100/66 - 135/72)  BP(mean): --  RR: 18 (06 Jan 2018 11:23) (17 - 18)  SpO2: 97% (06 Jan 2018 11:23) (95% - 97%)                          14.9   8.97  )-----------( 241      ( 06 Jan 2018 08:33 )             43.8       01-06    141  |  102  |  17  ----------------------------<  108<H>  4.1   |  24  |  1.04    Ca    9.9      06 Jan 2018 08:33                MEDICATIONS  (STANDING):  aspirin enteric coated 81 milliGRAM(s) Oral daily  cholecalciferol 1000 Unit(s) Oral two times a day  docusate sodium 100 milliGRAM(s) Oral three times a day  famotidine    Tablet 20 milliGRAM(s) Oral two times a day  heparin  Injectable 5000 Unit(s) SubCutaneous every 8 hours  influenza   Vaccine 0.5 milliLiter(s) IntraMuscular once  memantine 10 milliGRAM(s) Oral daily  memantine 10 milliGRAM(s) Oral at bedtime  midodrine 5 milliGRAM(s) Oral three times a day  multivitamin 1 Tablet(s) Oral daily  nystatin Powder 1 Application(s) Topical two times a day  polyethylene glycol 3350 17 Gram(s) Oral daily  QUEtiapine 25 milliGRAM(s) Oral three times a day  sodium chloride 0.65% Nasal 1 Spray(s) Both Nostrils daily  tamsulosin 0.4 milliGRAM(s) Oral at bedtime    MEDICATIONS  (PRN):  acetaminophen   Tablet 650 milliGRAM(s) Oral every 6 hours PRN For Temp greater than 38 C (100.4 F)  ALBUTerol/ipratropium for Nebulization 3 milliLiter(s) Nebulizer every 6 hours PRN Shortness of Breath and/or Wheezing  aluminum hydroxide/magnesium hydroxide/simethicone Suspension 30 milliLiter(s) Oral every 6 hours PRN Dyspepsia  ondansetron Injectable 4 milliGRAM(s) IV Push every 6 hours PRN Nausea      Elderly WM in bed sitting up. Calm. No EPS. Offers no complaints. No psychosis/SI/HI. Mood is "ok" and affect constricted. Poor STM. Attn/conc and LTM fair.

## 2018-01-07 LAB
ANION GAP SERPL CALC-SCNC: 14 MMOL/L — SIGNIFICANT CHANGE UP (ref 5–17)
BUN SERPL-MCNC: 15 MG/DL — SIGNIFICANT CHANGE UP (ref 7–23)
CALCIUM SERPL-MCNC: 10.2 MG/DL — SIGNIFICANT CHANGE UP (ref 8.4–10.5)
CHLORIDE SERPL-SCNC: 103 MMOL/L — SIGNIFICANT CHANGE UP (ref 96–108)
CO2 SERPL-SCNC: 26 MMOL/L — SIGNIFICANT CHANGE UP (ref 22–31)
CREAT SERPL-MCNC: 1.11 MG/DL — SIGNIFICANT CHANGE UP (ref 0.5–1.3)
GLUCOSE SERPL-MCNC: 96 MG/DL — SIGNIFICANT CHANGE UP (ref 70–99)
HCT VFR BLD CALC: 43.5 % — SIGNIFICANT CHANGE UP (ref 39–50)
HGB BLD-MCNC: 14.6 G/DL — SIGNIFICANT CHANGE UP (ref 13–17)
MCHC RBC-ENTMCNC: 33.6 GM/DL — SIGNIFICANT CHANGE UP (ref 32–36)
MCHC RBC-ENTMCNC: 33.8 PG — SIGNIFICANT CHANGE UP (ref 27–34)
MCV RBC AUTO: 100.7 FL — HIGH (ref 80–100)
PLATELET # BLD AUTO: 230 K/UL — SIGNIFICANT CHANGE UP (ref 150–400)
POTASSIUM SERPL-MCNC: 5 MMOL/L — SIGNIFICANT CHANGE UP (ref 3.5–5.3)
POTASSIUM SERPL-SCNC: 5 MMOL/L — SIGNIFICANT CHANGE UP (ref 3.5–5.3)
RBC # BLD: 4.32 M/UL — SIGNIFICANT CHANGE UP (ref 4.2–5.8)
RBC # FLD: 14.4 % — SIGNIFICANT CHANGE UP (ref 10.3–14.5)
SODIUM SERPL-SCNC: 143 MMOL/L — SIGNIFICANT CHANGE UP (ref 135–145)
WBC # BLD: 8.03 K/UL — SIGNIFICANT CHANGE UP (ref 3.8–10.5)
WBC # FLD AUTO: 8.03 K/UL — SIGNIFICANT CHANGE UP (ref 3.8–10.5)

## 2018-01-07 RX ADMIN — Medication 100 MILLIGRAM(S): at 21:44

## 2018-01-07 RX ADMIN — Medication 1000 UNIT(S): at 18:02

## 2018-01-07 RX ADMIN — MEMANTINE HYDROCHLORIDE 10 MILLIGRAM(S): 10 TABLET ORAL at 21:44

## 2018-01-07 RX ADMIN — FAMOTIDINE 20 MILLIGRAM(S): 10 INJECTION INTRAVENOUS at 18:02

## 2018-01-07 RX ADMIN — Medication 1000 UNIT(S): at 05:47

## 2018-01-07 RX ADMIN — QUETIAPINE FUMARATE 25 MILLIGRAM(S): 200 TABLET, FILM COATED ORAL at 21:45

## 2018-01-07 RX ADMIN — FAMOTIDINE 20 MILLIGRAM(S): 10 INJECTION INTRAVENOUS at 05:47

## 2018-01-07 RX ADMIN — NYSTATIN CREAM 1 APPLICATION(S): 100000 CREAM TOPICAL at 05:47

## 2018-01-07 RX ADMIN — NYSTATIN CREAM 1 APPLICATION(S): 100000 CREAM TOPICAL at 18:01

## 2018-01-07 RX ADMIN — TAMSULOSIN HYDROCHLORIDE 0.4 MILLIGRAM(S): 0.4 CAPSULE ORAL at 21:44

## 2018-01-07 RX ADMIN — HEPARIN SODIUM 5000 UNIT(S): 5000 INJECTION INTRAVENOUS; SUBCUTANEOUS at 05:47

## 2018-01-07 RX ADMIN — MIDODRINE HYDROCHLORIDE 5 MILLIGRAM(S): 2.5 TABLET ORAL at 05:47

## 2018-01-07 RX ADMIN — Medication 1 TABLET(S): at 13:18

## 2018-01-07 RX ADMIN — QUETIAPINE FUMARATE 25 MILLIGRAM(S): 200 TABLET, FILM COATED ORAL at 05:47

## 2018-01-07 RX ADMIN — QUETIAPINE FUMARATE 25 MILLIGRAM(S): 200 TABLET, FILM COATED ORAL at 13:18

## 2018-01-07 RX ADMIN — Medication 100 MILLIGRAM(S): at 05:47

## 2018-01-07 RX ADMIN — HEPARIN SODIUM 5000 UNIT(S): 5000 INJECTION INTRAVENOUS; SUBCUTANEOUS at 13:18

## 2018-01-07 RX ADMIN — Medication 81 MILLIGRAM(S): at 13:18

## 2018-01-07 RX ADMIN — Medication 1 SPRAY(S): at 13:18

## 2018-01-07 RX ADMIN — MEMANTINE HYDROCHLORIDE 10 MILLIGRAM(S): 10 TABLET ORAL at 13:18

## 2018-01-07 RX ADMIN — HEPARIN SODIUM 5000 UNIT(S): 5000 INJECTION INTRAVENOUS; SUBCUTANEOUS at 21:44

## 2018-01-07 RX ADMIN — Medication 100 MILLIGRAM(S): at 13:18

## 2018-01-07 RX ADMIN — MIDODRINE HYDROCHLORIDE 5 MILLIGRAM(S): 2.5 TABLET ORAL at 21:44

## 2018-01-07 RX ADMIN — MIDODRINE HYDROCHLORIDE 5 MILLIGRAM(S): 2.5 TABLET ORAL at 13:19

## 2018-01-07 NOTE — PROGRESS NOTE ADULT - SUBJECTIVE AND OBJECTIVE BOX
CARDIOLOGY     PROGRESS  NOTE   ________________________________________________    CHIEF COMPLAINT:Patient is a 83y old  Male who presents with a chief complaint of Loss of Consciousness with resultant fall (20 Dec 2017 11:35)    	  REVIEW OF SYSTEMS:  CONSTITUTIONAL: No fever, weight loss, or fatigue  EYES: No eye pain, visual disturbances, or discharge  ENT:  No difficulty hearing, tinnitus, vertigo; No sinus or throat pain  NECK: No pain or stiffness  RESPIRATORY: No cough, wheezing, chills or hemoptysis; No Shortness of Breath  CARDIOVASCULAR: No chest pain, palpitations, passing out, dizziness, or leg swelling  GASTROINTESTINAL: No abdominal or epigastric pain. No nausea, vomiting, or hematemesis; No diarrhea or constipation. No melena or hematochezia.  GENITOURINARY: No dysuria, frequency, hematuria, or incontinence  NEUROLOGICAL: No headaches, memory loss, loss of strength, numbness, or tremors  SKIN: No itching, burning, rashes, or lesions   LYMPH Nodes: No enlarged glands  ENDOCRINE: No heat or cold intolerance; No hair loss  MUSCULOSKELETAL: No joint pain or swelling; No muscle, back, or extremity pain  PSYCHIATRIC: No depression, anxiety, mood swings, or difficulty sleeping  HEME/LYMPH: No easy bruising, or bleeding gums  ALLERGY AND IMMUNOLOGIC: No hives or eczema	    [ ] All others negative	  [ ] Unable to obtain    PHYSICAL EXAM:  T(C): 36.5 (01-07-18 @ 11:28), Max: 36.7 (01-06-18 @ 20:52)  HR: 107 (01-07-18 @ 11:28) (63 - 107)  BP: 114/78 (01-07-18 @ 11:28) (114/78 - 117/77)  RR: 18 (01-07-18 @ 11:28) (18 - 18)  SpO2: 96% (01-07-18 @ 11:28) (96% - 97%)  Wt(kg): --  I&O's Summary    06 Jan 2018 07:01  -  07 Jan 2018 07:00  --------------------------------------------------------  IN: 1000 mL / OUT: 500 mL / NET: 500 mL        Appearance: Normal	  HEENT:   Normal oral mucosa, PERRL, EOMI	  Lymphatic: No lymphadenopathy  Cardiovascular: Normal S1 S2, No JVD, No murmurs, No edema  Respiratory: Lungs clear to auscultation	  Psychiatry: A & O x 3, Mood & affect appropriate  Gastrointestinal:  Soft, Non-tender, + BS	  Skin: No rashes, No ecchymoses, No cyanosis	  Neurologic: Non-focal  Extremities: Normal range of motion, No clubbing, cyanosis or edema  Vascular: Peripheral pulses palpable 2+ bilaterally    MEDICATIONS  (STANDING):  aspirin enteric coated 81 milliGRAM(s) Oral daily  cholecalciferol 1000 Unit(s) Oral two times a day  docusate sodium 100 milliGRAM(s) Oral three times a day  famotidine    Tablet 20 milliGRAM(s) Oral two times a day  heparin  Injectable 5000 Unit(s) SubCutaneous every 8 hours  influenza   Vaccine 0.5 milliLiter(s) IntraMuscular once  memantine 10 milliGRAM(s) Oral daily  memantine 10 milliGRAM(s) Oral at bedtime  midodrine 5 milliGRAM(s) Oral three times a day  multivitamin 1 Tablet(s) Oral daily  nystatin Powder 1 Application(s) Topical two times a day  polyethylene glycol 3350 17 Gram(s) Oral daily  QUEtiapine 25 milliGRAM(s) Oral three times a day  sodium chloride 0.65% Nasal 1 Spray(s) Both Nostrils daily  tamsulosin 0.4 milliGRAM(s) Oral at bedtime      TELEMETRY: 	    ECG:  	  RADIOLOGY:  OTHER: 	  	  LABS:	 	    CARDIAC MARKERS:                                14.6   8.03  )-----------( 230      ( 07 Jan 2018 08:57 )             43.5     01-07    143  |  103  |  15  ----------------------------<  96  5.0   |  26  |  1.11    Ca    10.2      07 Jan 2018 09:10      proBNP: Serum Pro-Brain Natriuretic Peptide: 114 pg/mL (12-20 @ 04:25)    Lipid Profile:   HgA1c:   TSH: Thyroid Stimulating Hormone, Serum: 1.89 uIU/mL (12-21 @ 07:36)          Assessment and plan  ---------------------------  pt with hx of bradycardia and s/p multiple falls most probably due to orthostatic hypotension.  pt currently on midodrine ,   may consider to change Seroquel if possible due to risk of worsening orthostatic hypotension.

## 2018-01-07 NOTE — PROGRESS NOTE ADULT - SUBJECTIVE AND OBJECTIVE BOX
Patient is a 83y old  Male who presents with a chief complaint of Loss of Consciousness with resultant fall (20 Dec 2017 11:35)      SUBJECTIVE / OVERNIGHT EVENTS:  No new dizziness  Review of Systems:   CONSTITUTIONAL: No fever, weight loss, or fatigue  EYES: No eye pain, visual disturbances, or discharge  ENMT:  No difficulty hearing, tinnitus, vertigo; No sinus or throat pain  NECK: No pain or stiffness  BREASTS: No pain, masses, or nipple discharge  RESPIRATORY: No cough, wheezing, chills or hemoptysis; No shortness of breath  CARDIOVASCULAR: No chest pain, palpitations, dizziness, or leg swelling  GASTROINTESTINAL: No abdominal or epigastric pain. No nausea, vomiting, or hematemesis; No diarrhea or constipation. No melena or hematochezia.  GENITOURINARY: No dysuria, frequency, hematuria, or incontinence  NEUROLOGICAL: No headaches, memory loss, loss of strength, numbness, or tremors  SKIN: No itching, burning, rashes, or lesions   LYMPH NODES: No enlarged glands  ENDOCRINE: No heat or cold intolerance; No hair loss  MUSCULOSKELETAL: No joint pain or swelling; No muscle, back, or extremity pain  PSYCHIATRIC: No depression, anxiety, mood swings, or difficulty sleeping  HEME/LYMPH: No easy bruising, or bleeding gums  ALLERY AND IMMUNOLOGIC: No hives or eczema    MEDICATIONS  (STANDING):  aspirin enteric coated 81 milliGRAM(s) Oral daily  cholecalciferol 1000 Unit(s) Oral two times a day  docusate sodium 100 milliGRAM(s) Oral three times a day  famotidine    Tablet 20 milliGRAM(s) Oral two times a day  heparin  Injectable 5000 Unit(s) SubCutaneous every 8 hours  influenza   Vaccine 0.5 milliLiter(s) IntraMuscular once  memantine 10 milliGRAM(s) Oral daily  memantine 10 milliGRAM(s) Oral at bedtime  midodrine 5 milliGRAM(s) Oral three times a day  multivitamin 1 Tablet(s) Oral daily  nystatin Powder 1 Application(s) Topical two times a day  polyethylene glycol 3350 17 Gram(s) Oral daily  QUEtiapine 25 milliGRAM(s) Oral three times a day  sodium chloride 0.65% Nasal 1 Spray(s) Both Nostrils daily  tamsulosin 0.4 milliGRAM(s) Oral at bedtime    MEDICATIONS  (PRN):  acetaminophen   Tablet 650 milliGRAM(s) Oral every 6 hours PRN For Temp greater than 38 C (100.4 F)  ALBUTerol/ipratropium for Nebulization 3 milliLiter(s) Nebulizer every 6 hours PRN Shortness of Breath and/or Wheezing  aluminum hydroxide/magnesium hydroxide/simethicone Suspension 30 milliLiter(s) Oral every 6 hours PRN Dyspepsia  ondansetron Injectable 4 milliGRAM(s) IV Push every 6 hours PRN Nausea      PHYSICAL EXAM:  Vital Signs Last 24 Hrs  T(C): 36.5 (07 Jan 2018 11:28), Max: 36.7 (06 Jan 2018 20:52)  T(F): 97.7 (07 Jan 2018 11:28), Max: 98.1 (06 Jan 2018 20:52)  HR: 107 (07 Jan 2018 11:28) (63 - 107)  BP: 114/78 (07 Jan 2018 11:28) (114/78 - 117/77)  BP(mean): --  RR: 18 (07 Jan 2018 11:28) (18 - 18)  SpO2: 96% (07 Jan 2018 11:28) (96% - 97%)  I&O's Summary    06 Jan 2018 07:01  -  07 Jan 2018 07:00  --------------------------------------------------------  IN: 1000 mL / OUT: 500 mL / NET: 500 mL    07 Jan 2018 07:01  -  07 Jan 2018 15:17  --------------------------------------------------------  IN: 600 mL / OUT: 0 mL / NET: 600 mL      GENERAL: NAD, well-developed  HEAD:  Atraumatic, Normocephalic  EYES: EOMI, PERRLA, conjunctiva and sclera clear  NECK: Supple, No JVD  CHEST/LUNG: Clear to auscultation bilaterally; No wheeze  HEART: Regular rate and rhythm; No murmurs, rubs, or gallops  ABDOMEN: Soft, Nontender, Nondistended; Bowel sounds present  EXTREMITIES:  2+ Peripheral Pulses, No clubbing, cyanosis, or edema  PSYCH: AAOx3  NEUROLOGY: non-focal  SKIN: No rashes or lesions    LABS:  CAPILLARY BLOOD GLUCOSE                              14.6   8.03  )-----------( 230      ( 07 Jan 2018 08:57 )             43.5     01-07    143  |  103  |  15  ----------------------------<  96  5.0   |  26  |  1.11    Ca    10.2      07 Jan 2018 09:10                RADIOLOGY & ADDITIONAL TESTS:    Imaging Personally Reviewed:    Consultant(s) Notes Reviewed:      Care Discussed with Consultants/Other Providers:

## 2018-01-08 ENCOUNTER — TRANSCRIPTION ENCOUNTER (OUTPATIENT)
Age: 83
End: 2018-01-08

## 2018-01-08 VITALS
RESPIRATION RATE: 18 BRPM | DIASTOLIC BLOOD PRESSURE: 68 MMHG | SYSTOLIC BLOOD PRESSURE: 109 MMHG | OXYGEN SATURATION: 95 % | TEMPERATURE: 98 F | HEART RATE: 90 BPM

## 2018-01-08 LAB
ANION GAP SERPL CALC-SCNC: 14 MMOL/L — SIGNIFICANT CHANGE UP (ref 5–17)
BUN SERPL-MCNC: 20 MG/DL — SIGNIFICANT CHANGE UP (ref 7–23)
CALCIUM SERPL-MCNC: 9.9 MG/DL — SIGNIFICANT CHANGE UP (ref 8.4–10.5)
CHLORIDE SERPL-SCNC: 104 MMOL/L — SIGNIFICANT CHANGE UP (ref 96–108)
CO2 SERPL-SCNC: 25 MMOL/L — SIGNIFICANT CHANGE UP (ref 22–31)
CREAT SERPL-MCNC: 1.16 MG/DL — SIGNIFICANT CHANGE UP (ref 0.5–1.3)
GLUCOSE SERPL-MCNC: 95 MG/DL — SIGNIFICANT CHANGE UP (ref 70–99)
HCT VFR BLD CALC: 42.5 % — SIGNIFICANT CHANGE UP (ref 39–50)
HGB BLD-MCNC: 14.3 G/DL — SIGNIFICANT CHANGE UP (ref 13–17)
MCHC RBC-ENTMCNC: 33.6 GM/DL — SIGNIFICANT CHANGE UP (ref 32–36)
MCHC RBC-ENTMCNC: 33.7 PG — SIGNIFICANT CHANGE UP (ref 27–34)
MCV RBC AUTO: 100.2 FL — HIGH (ref 80–100)
PLATELET # BLD AUTO: 236 K/UL — SIGNIFICANT CHANGE UP (ref 150–400)
POTASSIUM SERPL-MCNC: 4.5 MMOL/L — SIGNIFICANT CHANGE UP (ref 3.5–5.3)
POTASSIUM SERPL-SCNC: 4.5 MMOL/L — SIGNIFICANT CHANGE UP (ref 3.5–5.3)
RBC # BLD: 4.24 M/UL — SIGNIFICANT CHANGE UP (ref 4.2–5.8)
RBC # FLD: 14.5 % — SIGNIFICANT CHANGE UP (ref 10.3–14.5)
SODIUM SERPL-SCNC: 143 MMOL/L — SIGNIFICANT CHANGE UP (ref 135–145)
WBC # BLD: 7.06 K/UL — SIGNIFICANT CHANGE UP (ref 3.8–10.5)
WBC # FLD AUTO: 7.06 K/UL — SIGNIFICANT CHANGE UP (ref 3.8–10.5)

## 2018-01-08 PROCEDURE — 80053 COMPREHEN METABOLIC PANEL: CPT

## 2018-01-08 PROCEDURE — 84443 ASSAY THYROID STIM HORMONE: CPT

## 2018-01-08 PROCEDURE — 82947 ASSAY GLUCOSE BLOOD QUANT: CPT

## 2018-01-08 PROCEDURE — 84132 ASSAY OF SERUM POTASSIUM: CPT

## 2018-01-08 PROCEDURE — 85027 COMPLETE CBC AUTOMATED: CPT

## 2018-01-08 PROCEDURE — 83735 ASSAY OF MAGNESIUM: CPT

## 2018-01-08 PROCEDURE — 94640 AIRWAY INHALATION TREATMENT: CPT

## 2018-01-08 PROCEDURE — 93306 TTE W/DOPPLER COMPLETE: CPT

## 2018-01-08 PROCEDURE — 99285 EMERGENCY DEPT VISIT HI MDM: CPT | Mod: 25

## 2018-01-08 PROCEDURE — 80048 BASIC METABOLIC PNL TOTAL CA: CPT

## 2018-01-08 PROCEDURE — 84100 ASSAY OF PHOSPHORUS: CPT

## 2018-01-08 PROCEDURE — 82553 CREATINE MB FRACTION: CPT

## 2018-01-08 PROCEDURE — 85014 HEMATOCRIT: CPT

## 2018-01-08 PROCEDURE — 84436 ASSAY OF TOTAL THYROXINE: CPT

## 2018-01-08 PROCEDURE — 97116 GAIT TRAINING THERAPY: CPT

## 2018-01-08 PROCEDURE — 87486 CHLMYD PNEUM DNA AMP PROBE: CPT

## 2018-01-08 PROCEDURE — 93005 ELECTROCARDIOGRAM TRACING: CPT

## 2018-01-08 PROCEDURE — 71250 CT THORAX DX C-: CPT

## 2018-01-08 PROCEDURE — 93970 EXTREMITY STUDY: CPT

## 2018-01-08 PROCEDURE — 72125 CT NECK SPINE W/O DYE: CPT

## 2018-01-08 PROCEDURE — 84484 ASSAY OF TROPONIN QUANT: CPT

## 2018-01-08 PROCEDURE — 97110 THERAPEUTIC EXERCISES: CPT

## 2018-01-08 PROCEDURE — 97161 PT EVAL LOW COMPLEX 20 MIN: CPT

## 2018-01-08 PROCEDURE — 73562 X-RAY EXAM OF KNEE 3: CPT

## 2018-01-08 PROCEDURE — 87633 RESP VIRUS 12-25 TARGETS: CPT

## 2018-01-08 PROCEDURE — 82803 BLOOD GASES ANY COMBINATION: CPT

## 2018-01-08 PROCEDURE — 84295 ASSAY OF SERUM SODIUM: CPT

## 2018-01-08 PROCEDURE — 97530 THERAPEUTIC ACTIVITIES: CPT

## 2018-01-08 PROCEDURE — 70450 CT HEAD/BRAIN W/O DYE: CPT

## 2018-01-08 PROCEDURE — 83605 ASSAY OF LACTIC ACID: CPT

## 2018-01-08 PROCEDURE — 81001 URINALYSIS AUTO W/SCOPE: CPT

## 2018-01-08 PROCEDURE — 82550 ASSAY OF CK (CPK): CPT

## 2018-01-08 PROCEDURE — 84480 ASSAY TRIIODOTHYRONINE (T3): CPT

## 2018-01-08 PROCEDURE — 87581 M.PNEUMON DNA AMP PROBE: CPT

## 2018-01-08 PROCEDURE — 82330 ASSAY OF CALCIUM: CPT

## 2018-01-08 PROCEDURE — 83880 ASSAY OF NATRIURETIC PEPTIDE: CPT

## 2018-01-08 PROCEDURE — 82435 ASSAY OF BLOOD CHLORIDE: CPT

## 2018-01-08 PROCEDURE — 87798 DETECT AGENT NOS DNA AMP: CPT

## 2018-01-08 RX ORDER — MEMANTINE HYDROCHLORIDE 10 MG/1
1 TABLET ORAL
Qty: 0 | Refills: 0 | COMMUNITY
Start: 2018-01-08

## 2018-01-08 RX ORDER — RANITIDINE HYDROCHLORIDE 150 MG/1
1 TABLET, FILM COATED ORAL
Qty: 0 | Refills: 0 | COMMUNITY

## 2018-01-08 RX ORDER — MIDODRINE HYDROCHLORIDE 2.5 MG/1
1 TABLET ORAL
Qty: 0 | Refills: 0 | COMMUNITY

## 2018-01-08 RX ORDER — POLYETHYLENE GLYCOL 3350 17 G/17G
17 POWDER, FOR SOLUTION ORAL
Qty: 0 | Refills: 0 | COMMUNITY
Start: 2018-01-08

## 2018-01-08 RX ORDER — IPRATROPIUM/ALBUTEROL SULFATE 18-103MCG
3 AEROSOL WITH ADAPTER (GRAM) INHALATION
Qty: 0 | Refills: 0 | COMMUNITY
Start: 2018-01-08

## 2018-01-08 RX ORDER — QUETIAPINE FUMARATE 200 MG/1
1 TABLET, FILM COATED ORAL
Qty: 0 | Refills: 0 | COMMUNITY
Start: 2018-01-08

## 2018-01-08 RX ORDER — TAMSULOSIN HYDROCHLORIDE 0.4 MG/1
1 CAPSULE ORAL
Qty: 0 | Refills: 0 | COMMUNITY
Start: 2018-01-08

## 2018-01-08 RX ORDER — NYSTATIN CREAM 100000 [USP'U]/G
1 CREAM TOPICAL
Qty: 0 | Refills: 0 | COMMUNITY
Start: 2018-01-08

## 2018-01-08 RX ORDER — MIDODRINE HYDROCHLORIDE 2.5 MG/1
1 TABLET ORAL
Qty: 0 | Refills: 0 | COMMUNITY
Start: 2018-01-08

## 2018-01-08 RX ORDER — FAMOTIDINE 10 MG/ML
1 INJECTION INTRAVENOUS
Qty: 0 | Refills: 0 | COMMUNITY
Start: 2018-01-08

## 2018-01-08 RX ORDER — SODIUM CHLORIDE 0.65 %
1 AEROSOL, SPRAY (ML) NASAL
Qty: 0 | Refills: 0 | COMMUNITY
Start: 2018-01-08

## 2018-01-08 RX ORDER — CHOLECALCIFEROL (VITAMIN D3) 125 MCG
1000 CAPSULE ORAL
Qty: 0 | Refills: 0 | COMMUNITY
Start: 2018-01-08

## 2018-01-08 RX ADMIN — QUETIAPINE FUMARATE 25 MILLIGRAM(S): 200 TABLET, FILM COATED ORAL at 05:33

## 2018-01-08 RX ADMIN — HEPARIN SODIUM 5000 UNIT(S): 5000 INJECTION INTRAVENOUS; SUBCUTANEOUS at 13:16

## 2018-01-08 RX ADMIN — FAMOTIDINE 20 MILLIGRAM(S): 10 INJECTION INTRAVENOUS at 17:13

## 2018-01-08 RX ADMIN — Medication 1 TABLET(S): at 13:16

## 2018-01-08 RX ADMIN — FAMOTIDINE 20 MILLIGRAM(S): 10 INJECTION INTRAVENOUS at 05:33

## 2018-01-08 RX ADMIN — Medication 100 MILLIGRAM(S): at 13:16

## 2018-01-08 RX ADMIN — Medication 100 MILLIGRAM(S): at 05:33

## 2018-01-08 RX ADMIN — MIDODRINE HYDROCHLORIDE 5 MILLIGRAM(S): 2.5 TABLET ORAL at 05:33

## 2018-01-08 RX ADMIN — Medication 1000 UNIT(S): at 06:37

## 2018-01-08 RX ADMIN — HEPARIN SODIUM 5000 UNIT(S): 5000 INJECTION INTRAVENOUS; SUBCUTANEOUS at 05:33

## 2018-01-08 RX ADMIN — MIDODRINE HYDROCHLORIDE 5 MILLIGRAM(S): 2.5 TABLET ORAL at 13:16

## 2018-01-08 RX ADMIN — QUETIAPINE FUMARATE 25 MILLIGRAM(S): 200 TABLET, FILM COATED ORAL at 14:34

## 2018-01-08 RX ADMIN — NYSTATIN CREAM 1 APPLICATION(S): 100000 CREAM TOPICAL at 05:33

## 2018-01-08 RX ADMIN — NYSTATIN CREAM 1 APPLICATION(S): 100000 CREAM TOPICAL at 17:13

## 2018-01-08 RX ADMIN — POLYETHYLENE GLYCOL 3350 17 GRAM(S): 17 POWDER, FOR SOLUTION ORAL at 13:17

## 2018-01-08 RX ADMIN — MEMANTINE HYDROCHLORIDE 10 MILLIGRAM(S): 10 TABLET ORAL at 13:16

## 2018-01-08 RX ADMIN — Medication 1 SPRAY(S): at 13:20

## 2018-01-08 RX ADMIN — Medication 1000 UNIT(S): at 17:13

## 2018-01-08 RX ADMIN — Medication 81 MILLIGRAM(S): at 13:16

## 2018-01-08 RX ADMIN — Medication 1000 UNIT(S): at 05:33

## 2018-01-08 NOTE — DISCHARGE NOTE ADULT - PATIENT PORTAL LINK FT
“You can access the [facility] Patient Portal, offered by [facility], by registering with the following website: [email or web address]”

## 2018-01-08 NOTE — DISCHARGE NOTE ADULT - ADDITIONAL INSTRUCTIONS
Follow-up with your Primary care doctor in one week Follow-up with your Primary care doctor in one week  Follow-up with psychiatry

## 2018-01-08 NOTE — DISCHARGE NOTE ADULT - HOSPITAL COURSE
Patient is an 82 yo M w/ Alzheimer's Dementia, HLD, BPH, sinus bradycardia s/p loop recorder, multiple hospitalizations for falls now p/w un-witnessed fall.   admitted with syncope/fall/ right rib non-displaced fx, no surgical intervention, pain management, physical therapy  CT Head: No intracranial hemorrhage, mass effect, or midline shift  CT Cervical Spine: No acute displaced cervical spine fracture  B/L Knee Xray - Mild arthrosis of the bilateral knees, right greater than left  Xray pelvis- No acute pelvic fracture or dislocation.  +RVS- supportive care  Dementia- Seroquel 25mg TID (hold for sedation) being followed by psychiatry  ILR with brief episodes of bradycardia as well as PAT/SVT, No changes made by EP  Cardiology: echo 6/17, no need to repeat during this admission, supportive tx for URI

## 2018-01-08 NOTE — PROVIDER CONTACT NOTE (OTHER) - ACTION/TREATMENT ORDERED:
PA will consider 1:1 continual observation for pt. Pt currently has standing order for Haldol and Seroquel. Continue to monitor and maintain safety.
Administer 1x haldol order. will come assess pt. will continue to monitor.
Apply lotion and continue to observe
Mg and phos added to labs.
NP came to bedside to assess pt, NP aware of meds being refused, continue to monitor pt
PA notified, will order haldol 2mg IVP for agitation. Will continue to monitor.
ordered constant observation one-to-one for pt safety. Will continue to monitor.
will renew 1:1 for patient safety -- high fall risk

## 2018-01-08 NOTE — PROGRESS NOTE ADULT - SUBJECTIVE AND OBJECTIVE BOX
Events noted. RN tells me pt. was agitated overnight and calmed down when given a cookie. Discussed with nursing aid.       Vital Signs Last 24 Hrs  T(C): 36.6 (08 Jan 2018 04:06), Max: 36.7 (07 Jan 2018 20:45)  T(F): 97.9 (08 Jan 2018 04:06), Max: 98.1 (07 Jan 2018 20:45)  HR: 96 (08 Jan 2018 04:06) (95 - 107)  BP: 107/72 (08 Jan 2018 04:06) (107/72 - 114/78)  BP(mean): --  RR: 18 (08 Jan 2018 04:06) (18 - 18)  SpO2: 96% (08 Jan 2018 04:06) (94% - 96%)                          14.3   7.06  )-----------( 236      ( 08 Jan 2018 07:16 )             42.5       01-08    143  |  104  |  20  ----------------------------<  95  4.5   |  25  |  1.16    Ca    9.9      08 Jan 2018 07:22                MEDICATIONS  (STANDING):  aspirin enteric coated 81 milliGRAM(s) Oral daily  cholecalciferol 1000 Unit(s) Oral two times a day  docusate sodium 100 milliGRAM(s) Oral three times a day  famotidine    Tablet 20 milliGRAM(s) Oral two times a day  heparin  Injectable 5000 Unit(s) SubCutaneous every 8 hours  influenza   Vaccine 0.5 milliLiter(s) IntraMuscular once  memantine 10 milliGRAM(s) Oral daily  memantine 10 milliGRAM(s) Oral at bedtime  midodrine 5 milliGRAM(s) Oral three times a day  multivitamin 1 Tablet(s) Oral daily  nystatin Powder 1 Application(s) Topical two times a day  polyethylene glycol 3350 17 Gram(s) Oral daily  QUEtiapine 25 milliGRAM(s) Oral three times a day  sodium chloride 0.65% Nasal 1 Spray(s) Both Nostrils daily  tamsulosin 0.4 milliGRAM(s) Oral at bedtime    MEDICATIONS  (PRN):  acetaminophen   Tablet 650 milliGRAM(s) Oral every 6 hours PRN For Temp greater than 38 C (100.4 F)  ALBUTerol/ipratropium for Nebulization 3 milliLiter(s) Nebulizer every 6 hours PRN Shortness of Breath and/or Wheezing  aluminum hydroxide/magnesium hydroxide/simethicone Suspension 30 milliLiter(s) Oral every 6 hours PRN Dyspepsia  ondansetron Injectable 4 milliGRAM(s) IV Push every 6 hours PRN Nausea      Elderly WM sleeping in his chair. Did not awaken to my voice. No EPS.

## 2018-01-08 NOTE — DISCHARGE NOTE ADULT - PLAN OF CARE
resolved HOME CARE INSTRUCTIONS  Have someone stay with you until you feel stable.  Do not drive, operate machinery, or play sports until your caregiver says it is okay.  Keep all follow-up appointments as directed by your caregiver.   Lie down right away if you start feeling like you might faint. Breathe deeply and steadily. Wait until all the symptoms have passed.Drink enough fluids to keep your urine clear or pale yellow.  If you are taking blood pressure or heart medicine, get up slowly, taking several minutes to sit and then stand. This can reduce dizziness.  SEEK IMMEDIATE MEDICAL CARE IF:  You have a severe headache.  You have unusual pain in the chest, abdomen, or back.  You are bleeding from the mouth or rectum, or you have black or tarry stool.  You have an irregular or very fast heartbeat.  You have pain with breathing.  You have repeated fainting or seizure-like jerking during an episode.  You faint when sitting or lying down.  You have confusion.  You have difficulty walking.  You have severe weakness.  You have vision problems.  If you fainted, call your local emergency services (_____________________). Do not drive yourself to the hospital To help reduce your chance of RSV:  • Practice healthy habits, such as: •Wash your hands often, especially after touching someone who may have a cold or other RSV infection.  •Avoid touching your face and rubbing your eyes.  •Do not share items such as cups, glasses, silverware, or towels with people who may have a cold or other RSV infection.  •Avoid smoke exposure. The following suggestions are intended for the person living with, or caring for, the person with dementia. Create a safe environment. Keep the house free from clutter. Remove rugs or anything that might contribute to a fall. Remove objects that might break and hurt the person. Make sure lighting is good, both inside and outside. Install grab rails as needed. Use a monitoring device to alert you to falls or other needs for help. Reduce confusion. Keep familiar objects and people around. Use night lights or dim lights at night. Label items or areas. Use reminders, notes, or directions for daily activities or tasks. Keep a simple, consistent routine for waking, meals, bathing, dressing, and bedtime. Create a calm, quiet environment. Place large clocks and calendars prominently. Display emergency numbers and home address near all telephones. Have a consistent nighttime routine. Ensure a regular walking or physical activity schedule. Involve the person in daily activities as much as possible. Limit napping during the day. Limit caffeine. Encourage good nutrition and hydration. Monitor chewing and swallowing ability. Continue with routine vision, hearing, dental, and medical screenings. SEEK MEDICAL CARE IF: New behavioral problems start such as moodiness, aggressiveness, or seeing things that are not there (hallucinations). Any new problem with brain function happens. This includes problems with balance, speech, or falling a lot. Problems with swallowing develop. Any symptoms of other illness happen. Small changes or worsening in any aspect of brain function can be a sign that the illness is getting worse. It can also be a sign of another medical illness such as infection. Seeing a caregiver right away is important. no acute thoracic surgery intervention  pain control Take medication as directed  The following suggestions are intended for the person living with, or caring for, the person with dementia. Create a safe environment. Keep the house free from clutter. Remove rugs or anything that might contribute to a fall. Remove objects that might break and hurt the person. Make sure lighting is good, both inside and outside. Install grab rails as needed. Use a monitoring device to alert you to falls or other needs for help. Reduce confusion. Keep familiar objects and people around. Use night lights or dim lights at night. Label items or areas. Use reminders, notes, or directions for daily activities or tasks. Keep a simple, consistent routine for waking, meals, bathing, dressing, and bedtime. Create a calm, quiet environment. Place large clocks and calendars prominently. Display emergency numbers and home address near all telephones. Have a consistent nighttime routine. Ensure a regular walking or physical activity schedule. Involve the person in daily activities as much as possible. Limit napping during the day. Limit caffeine. Encourage good nutrition and hydration. Monitor chewing and swallowing ability. Continue with routine vision, hearing, dental, and medical screenings. SEEK MEDICAL CARE IF: New behavioral problems start such as moodiness, aggressiveness, or seeing things that are not there (hallucinations). Any new problem with brain function happens. This includes problems with balance, speech, or falling a lot. Problems with swallowing develop. Any symptoms of other illness happen. Small changes or worsening in any aspect of brain function can be a sign that the illness is getting worse. It can also be a sign of another medical illness such as infection. Seeing a caregiver right away is important. no acute thoracic surgery intervention  pain control  Physical therapy

## 2018-01-08 NOTE — PROGRESS NOTE ADULT - SUBJECTIVE AND OBJECTIVE BOX
MEDICINE, PROGRESS NOTE [phone]    EVELIN CHAVEZ 83y [id number]    Patient seen and examined.  Patient is a 83y old  Male who presents with a chief complaint of Syncope (08 Jan 2018 09:11)      PAST MEDICAL & SURGICAL HISTORY:  Alzheimer disease: with psuedobulbular affect  BPH (benign prostatic hypertrophy)  Renal calculi  Hyperlipidemia  GERD (gastroesophageal reflux disease)  Nephrolithiasis  H/O cataract extraction, right  S/P tonsillectomy    MEDICATIONS  (STANDING):  aspirin enteric coated 81 milliGRAM(s) Oral daily  cholecalciferol 1000 Unit(s) Oral two times a day  docusate sodium 100 milliGRAM(s) Oral three times a day  famotidine    Tablet 20 milliGRAM(s) Oral two times a day  heparin  Injectable 5000 Unit(s) SubCutaneous every 8 hours  influenza   Vaccine 0.5 milliLiter(s) IntraMuscular once  memantine 10 milliGRAM(s) Oral daily  memantine 10 milliGRAM(s) Oral at bedtime  midodrine 5 milliGRAM(s) Oral three times a day  multivitamin 1 Tablet(s) Oral daily  nystatin Powder 1 Application(s) Topical two times a day  polyethylene glycol 3350 17 Gram(s) Oral daily  QUEtiapine 25 milliGRAM(s) Oral three times a day  sodium chloride 0.65% Nasal 1 Spray(s) Both Nostrils daily  tamsulosin 0.4 milliGRAM(s) Oral at bedtime    MEDICATIONS  (PRN):  acetaminophen   Tablet 650 milliGRAM(s) Oral every 6 hours PRN For Temp greater than 38 C (100.4 F)  ALBUTerol/ipratropium for Nebulization 3 milliLiter(s) Nebulizer every 6 hours PRN Shortness of Breath and/or Wheezing  aluminum hydroxide/magnesium hydroxide/simethicone Suspension 30 milliLiter(s) Oral every 6 hours PRN Dyspepsia  ondansetron Injectable 4 milliGRAM(s) IV Push every 6 hours PRN Nausea    Allergies    IV dye (Rash)  penicillin (Rash)    Intolerances        PHYSICAL EXAM:  Constitutional: NAD  HEENT: Normocephalic, EOMI  Neck:  No JVD  Respiratory: CTA B/L, No wheezes  Cardiovascular: S1, S2, RRR, + systolic murmur  Gastrointestinal: BS+, soft, NT/ND  Extremities: No peripheral edema  Neurological: AAOX2, no focal deficits  Psychiatric: Normal mood, normal affect  : No Barone    Vital Signs Last 24 Hrs  T(C): 36.2 (08 Jan 2018 12:37), Max: 36.7 (07 Jan 2018 20:45)  T(F): 97.2 (08 Jan 2018 12:37), Max: 98.1 (07 Jan 2018 20:45)  HR: 90 (08 Jan 2018 16:45) (84 - 117)  BP: 107/70 (08 Jan 2018 14:57) (107/70 - 108/71)  BP(mean): --  RR: 18 (08 Jan 2018 12:37) (18 - 18)  SpO2: 95% (08 Jan 2018 12:37) (94% - 96%)  I&O's Summary    07 Jan 2018 07:01  -  08 Jan 2018 07:00  --------------------------------------------------------  IN: 1140 mL / OUT: 0 mL / NET: 1140 mL    08 Jan 2018 07:01  -  08 Jan 2018 17:18  --------------------------------------------------------  IN: 720 mL / OUT: 0 mL / NET: 720 mL        LABS:                        14.3   7.06  )-----------( 236      ( 08 Jan 2018 07:16 )             42.5     01-08    143  |  104  |  20  ----------------------------<  95  4.5   |  25  |  1.16    Ca    9.9      08 Jan 2018 07:22

## 2018-01-08 NOTE — DISCHARGE NOTE ADULT - CARE PROVIDER_API CALL
Dalton Tafoya (MD), Internal Medicine  [address]  [address]  [place] [address]  Phone: [phone]  Fax: [phone]    Walter Riley (), Cardiology Medicine  [address]  [address]  [place] [address]  Phone: [phone]  Fax: [phone]    Filemon Braxton), Psychiatry  [address]  [address]  [place] [address]  Phone: [phone]  Fax: [phone]

## 2018-01-08 NOTE — PROGRESS NOTE ADULT - PROVIDER SPECIALTY LIST ADULT
Cardiology
Electrophysiology
Internal Medicine
Psychiatry
Cardiology
Cardiology
Internal Medicine
Psychiatry
Psychiatry

## 2018-01-08 NOTE — PROGRESS NOTE ADULT - ASSESSMENT
Dementia with behavioral disturbance  Needs Seroquel to treat agitation    Recommend  Hold individual doses of Seroquel if sedated or hypotensive  Have pt. seen by psychiatrist at the rehab.    Filemon Braxton M.D.  Psychiatry  [phone]
No signs of akathisia.  Cognitive Disorder NOS      Recommend  Continue with Seroquel. Follow QTc and hold Seroquel if QTc is 500ms or greater.  Discussed with NP.     Filemon Braxton M.D.  Psychiatry  [phone]
rsv viral syndrome  falls  deconditioning  Dementia with behavioral disturbance  restlessness    continue current care,   appreciate psych input for agitation.  Card ANTWON noted  d/c planning to rehab
Dementia with behavioral disturbance    Recommend  Continue with Seroquel 25mg tid  Followup QTc    Filemon Braxton M.D.  Psychiatry  [phone]
Dementia with behavioral disturbance    Recommend  Continue with Seroquel as he needs it for behavior control. He fails to appreciate his risk of falls and tries to walk unassisted as a result.  Follow QTc.  At his rehab, have psychiatrist there evaluate for need to continue Seroquel.    Filemon Braxton M.D.  Psychiatry  [phone]
Dementia with behavioral disturbance    Recommend  Followup QTc and hold Seroquel if QTc 500ms or greater or if BP less than 90/60.  Namenda 10mg bid      Filemon Braxton M.D.  Psychiatry  [phone]
Dementia with behavioral disturbance  Tolerating Seroquel and it helps with his agitation (trying to get out of his chair and walk on his own less often).    Recommend  Continue with Seroquel.   Followup QTc  Memantine change to 10mg bid     Filemon Braxton M.D.  Psychiatry  [phone]
Dementia with behavioral disturbance. He was agitated even prior to dose increase of Namenda so I don't think Namenda is the cause of agitation.    Recommend  Check QTc and if less than 500ms, give Seroquel 25mg p.o. tid (as he has required Haldol). Hold Seroquel doses if he is sedated.   Filemon Braxton M.D.  Psychiatry  [phone]
Dementia. No sx. of akathisia now.    Recommend  Continue with Seroquel and prn Ativan. Follow QTc.  Discussed with QUINTON Rivera.    Filemon Braxton M.D.  Psychiatry  [phone]  '
Neurocognitive disorder. He still tries to walk on his own and has impaired insight and judgment. He fails to recognize he is unsteady on his feet and can fall again.    Recommend  Follow QTc. Seroquel 25mg bid (hold individual doses if hypotensive, sedated, or if QTc is 500ms or greater).    Filemon Braxton M.D.  Psychiatry  [phone]
rsv viral syndrome  falls  deconditioning  Dementia with behavioral disturbance    continue current care  PT  d/c planning
rsv viral syndrome  falls  deconditioning  Dementia with behavioral disturbance    continue current care  PT while in hospital   d/c planning to rehab
rsv viral syndrome  falls  deconditioning  Dementia with behavioral disturbance    continue current care  appreciate cardio and psych input  d/c planning
rsv viral syndrome  falls  deconditioning  Dementia with behavioral disturbance    continue current care  d/c planning to rehab
rsv viral syndrome  falls  deconditioning  Dementia with behavioral disturbance    continue current care  enhanced supervision  appreciate psych input  monitor pt closely as appears sedated from haldol
rsv viral syndrome  falls  deconditioning  Dementia with behavioral disturbance    continue current care  may check knee xrays per wifes request  d/c planning
rsv viral syndrome  falls  deconditioning  Dementia with behavioral disturbance  restlessness    appreciate psych and cardio input  psych meds per Dr. mercado  continue current care  await d/c planning to rehab
rsv viral syndrome  falls  deconditioning  Dementia with behavioral disturbance  restlessness    continue current care  agitation meds per psych  d/c planning when able to rehab
rsv viral syndrome  falls  deconditioning  Dementia with behavioral disturbance  restlessness    continue current care  appreciate psych and cardio input, will check ekg in am  d/c planning to rehab
rsv viral syndrome  falls  deconditioning  Dementia with behavioral disturbance  restlessness    continue current care  appreciate psych input  observation  d/c planning to rehab
rsv viral syndrome  falls  deconditioning  Dementia with behavioral disturbance  restlessness    continue current care  appreciate psych input for agitation.  Card FU noted  d/c planning to rehab
rsv viral syndrome  falls  deconditioning  Dementia with behavioral disturbance  restlessness    continue current care  appreciate psych input for agitation.  d/c planning to rehab
rsv viral syndrome  falls  deconditioning  Dementia with behavioral disturbance  restlessness    continue current care  d/c planning to rehab
rsv viral syndrome  falls  deconditioning  Dementia with behavioral disturbance  restlessness    continue current care  discussed with dr mercado  d/c planning to rehab once bed available
rsv viral syndrome  falls  deconditioning  Dementia with behavioral disturbance  restlessness    continue current care  discussed with dr mercado  d/tasha planning
rsv viral syndrome  falls  deconditioning  Dementia with behavioral disturbance  restlessness    continue current care  follow psych recs for agitation  d/c planning to rehab
rsv viral syndrome  falls  deconditioning  Dementia with behavioral disturbance  restlessness    continue current care  no medical objection to d/c to rehab  appreciate psych and cardio input
rsv viral syndrome  falls  deconditioning  mild dementia    continue current care  appreciate cardio input  pt  d/c planning
Patient is a 83y old  Male who presents was admitted with of Loss of Consciousness with resultant fall (20 Dec 2017 11:35)., Asked to see pt who was sitting on the floor. No fall. No injury. PE unremarkable. Pt's wife and son notified at bedside. Placed on enhanced supervision

## 2018-01-08 NOTE — DISCHARGE NOTE ADULT - CARE PLAN
Principal Discharge DX:	Syncope  Goal:	resolved  Secondary Diagnosis:	RSV infection  Goal:	resolved  Secondary Diagnosis:	Alzheimer disease Principal Discharge DX:	Syncope  Goal:	resolved  Instructions for follow-up, activity and diet:	HOME CARE INSTRUCTIONS  Have someone stay with you until you feel stable.  Do not drive, operate machinery, or play sports until your caregiver says it is okay.  Keep all follow-up appointments as directed by your caregiver.   Lie down right away if you start feeling like you might faint. Breathe deeply and steadily. Wait until all the symptoms have passed.Drink enough fluids to keep your urine clear or pale yellow.  If you are taking blood pressure or heart medicine, get up slowly, taking several minutes to sit and then stand. This can reduce dizziness.  SEEK IMMEDIATE MEDICAL CARE IF:  You have a severe headache.  You have unusual pain in the chest, abdomen, or back.  You are bleeding from the mouth or rectum, or you have black or tarry stool.  You have an irregular or very fast heartbeat.  You have pain with breathing.  You have repeated fainting or seizure-like jerking during an episode.  You faint when sitting or lying down.  You have confusion.  You have difficulty walking.  You have severe weakness.  You have vision problems.  If you fainted, call your local emergency services (_____________________). Do not drive yourself to the hospital  Secondary Diagnosis:	RSV infection  Goal:	resolved  Secondary Diagnosis:	Alzheimer disease Principal Discharge DX:	Syncope  Goal:	resolved  Instructions for follow-up, activity and diet:	HOME CARE INSTRUCTIONS  Have someone stay with you until you feel stable.  Do not drive, operate machinery, or play sports until your caregiver says it is okay.  Keep all follow-up appointments as directed by your caregiver.   Lie down right away if you start feeling like you might faint. Breathe deeply and steadily. Wait until all the symptoms have passed.Drink enough fluids to keep your urine clear or pale yellow.  If you are taking blood pressure or heart medicine, get up slowly, taking several minutes to sit and then stand. This can reduce dizziness.  SEEK IMMEDIATE MEDICAL CARE IF:  You have a severe headache.  You have unusual pain in the chest, abdomen, or back.  You are bleeding from the mouth or rectum, or you have black or tarry stool.  You have an irregular or very fast heartbeat.  You have pain with breathing.  You have repeated fainting or seizure-like jerking during an episode.  You faint when sitting or lying down.  You have confusion.  You have difficulty walking.  You have severe weakness.  You have vision problems.  If you fainted, call your local emergency services (_____________________). Do not drive yourself to the hospital  Secondary Diagnosis:	RSV infection  Goal:	resolved  Instructions for follow-up, activity and diet:	To help reduce your chance of RSV:  • Practice healthy habits, such as: •Wash your hands often, especially after touching someone who may have a cold or other RSV infection.  •Avoid touching your face and rubbing your eyes.  •Do not share items such as cups, glasses, silverware, or towels with people who may have a cold or other RSV infection.  •Avoid smoke exposure.  Secondary Diagnosis:	Alzheimer disease  Instructions for follow-up, activity and diet:	The following suggestions are intended for the person living with, or caring for, the person with dementia. Create a safe environment. Keep the house free from clutter. Remove rugs or anything that might contribute to a fall. Remove objects that might break and hurt the person. Make sure lighting is good, both inside and outside. Install grab rails as needed. Use a monitoring device to alert you to falls or other needs for help. Reduce confusion. Keep familiar objects and people around. Use night lights or dim lights at night. Label items or areas. Use reminders, notes, or directions for daily activities or tasks. Keep a simple, consistent routine for waking, meals, bathing, dressing, and bedtime. Create a calm, quiet environment. Place large clocks and calendars prominently. Display emergency numbers and home address near all telephones. Have a consistent nighttime routine. Ensure a regular walking or physical activity schedule. Involve the person in daily activities as much as possible. Limit napping during the day. Limit caffeine. Encourage good nutrition and hydration. Monitor chewing and swallowing ability. Continue with routine vision, hearing, dental, and medical screenings. SEEK MEDICAL CARE IF: New behavioral problems start such as moodiness, aggressiveness, or seeing things that are not there (hallucinations). Any new problem with brain function happens. This includes problems with balance, speech, or falling a lot. Problems with swallowing develop. Any symptoms of other illness happen. Small changes or worsening in any aspect of brain function can be a sign that the illness is getting worse. It can also be a sign of another medical illness such as infection. Seeing a caregiver right away is important.  Secondary Diagnosis:	Closed fracture of one rib of right side, initial encounter  Instructions for follow-up, activity and diet:	no acute thoracic surgery intervention  pain control Principal Discharge DX:	Syncope  Goal:	resolved  Instructions for follow-up, activity and diet:	HOME CARE INSTRUCTIONS  Have someone stay with you until you feel stable.  Do not drive, operate machinery, or play sports until your caregiver says it is okay.  Keep all follow-up appointments as directed by your caregiver.   Lie down right away if you start feeling like you might faint. Breathe deeply and steadily. Wait until all the symptoms have passed.Drink enough fluids to keep your urine clear or pale yellow.  If you are taking blood pressure or heart medicine, get up slowly, taking several minutes to sit and then stand. This can reduce dizziness.  SEEK IMMEDIATE MEDICAL CARE IF:  You have a severe headache.  You have unusual pain in the chest, abdomen, or back.  You are bleeding from the mouth or rectum, or you have black or tarry stool.  You have an irregular or very fast heartbeat.  You have pain with breathing.  You have repeated fainting or seizure-like jerking during an episode.  You faint when sitting or lying down.  You have confusion.  You have difficulty walking.  You have severe weakness.  You have vision problems.  If you fainted, call your local emergency services (_____________________). Do not drive yourself to the hospital  Secondary Diagnosis:	RSV infection  Instructions for follow-up, activity and diet:	To help reduce your chance of RSV:  • Practice healthy habits, such as: •Wash your hands often, especially after touching someone who may have a cold or other RSV infection.  •Avoid touching your face and rubbing your eyes.  •Do not share items such as cups, glasses, silverware, or towels with people who may have a cold or other RSV infection.  •Avoid smoke exposure.  Secondary Diagnosis:	Alzheimer disease  Instructions for follow-up, activity and diet:	Take medication as directed  The following suggestions are intended for the person living with, or caring for, the person with dementia. Create a safe environment. Keep the house free from clutter. Remove rugs or anything that might contribute to a fall. Remove objects that might break and hurt the person. Make sure lighting is good, both inside and outside. Install grab rails as needed. Use a monitoring device to alert you to falls or other needs for help. Reduce confusion. Keep familiar objects and people around. Use night lights or dim lights at night. Label items or areas. Use reminders, notes, or directions for daily activities or tasks. Keep a simple, consistent routine for waking, meals, bathing, dressing, and bedtime. Create a calm, quiet environment. Place large clocks and calendars prominently. Display emergency numbers and home address near all telephones. Have a consistent nighttime routine. Ensure a regular walking or physical activity schedule. Involve the person in daily activities as much as possible. Limit napping during the day. Limit caffeine. Encourage good nutrition and hydration. Monitor chewing and swallowing ability. Continue with routine vision, hearing, dental, and medical screenings. SEEK MEDICAL CARE IF: New behavioral problems start such as moodiness, aggressiveness, or seeing things that are not there (hallucinations). Any new problem with brain function happens. This includes problems with balance, speech, or falling a lot. Problems with swallowing develop. Any symptoms of other illness happen. Small changes or worsening in any aspect of brain function can be a sign that the illness is getting worse. It can also be a sign of another medical illness such as infection. Seeing a caregiver right away is important.  Secondary Diagnosis:	Closed fracture of one rib of right side, initial encounter  Instructions for follow-up, activity and diet:	no acute thoracic surgery intervention  pain control  Physical therapy

## 2018-01-08 NOTE — DISCHARGE NOTE ADULT - MEDICATION SUMMARY - MEDICATIONS TO CHANGE
I will SWITCH the dose or number of times a day I take the medications listed below when I get home from the hospital:    QUEtiapine 25 mg oral tablet  -- 1 tab(s) by mouth once a day (at bedtime) - NEED FOR THIS MEDICATION TO BE REASSESSED BY FACILITY PSYCHIATRIST ON OR ABOUT 11/25/2017

## 2018-01-08 NOTE — DISCHARGE NOTE ADULT - MEDICATION SUMMARY - MEDICATIONS TO TAKE
I will START or STAY ON the medications listed below when I get home from the hospital:    aspirin 81 mg oral delayed release tablet  -- 1 tab(s) by mouth once a day  -- Indication: For CAD    Preparation H 0.25% rectal ointment  -- 1 application rectally 2 times a day, As Needed For Hemorrhoidal Pain  -- Check with your doctor before becoming pregnant.  For rectal use only.    -- Indication: For hemorrhoids    aluminum hydroxide-magnesium hydroxide 200 mg-200 mg/5 mL oral suspension  -- 30 milliliter(s) by mouth every 6 hours, As needed, Dyspepsia  -- Indication: For heartburn    tamsulosin 0.4 mg oral capsule  -- 1 cap(s) by mouth once a day (at bedtime)  -- Indication: For bph     Zetia 10 mg oral tablet  -- 1 tab(s) by mouth once a day  -- Indication: For Cholesterol    QUEtiapine 25 mg oral tablet  -- 1 tab(s) by mouth 3 times a day  -- Indication: For Agitation    ipratropium-albuterol 0.5 mg-2.5 mg/3 mLinhalation solution  -- 3 milliliter(s) inhaled every 6 hours, As needed, Shortness of Breath and/or Wheezing  -- Indication: For Asthma    nystatin 100,000 units/g topical powder  -- 1 application on skin 2 times a day  -- Indication: For .    famotidine 20 mg oral tablet  -- 1 tab(s) by mouth 2 times a day  -- Indication: For gerd    polyethylene glycol 3350 oral powder for reconstitution  -- 17 gram(s) by mouth once a day  -- Indication: For laxative    docusate sodium 100 mg oral capsule  -- 1 cap(s) by mouth 3 times a day  -- Indication: For Stool softner    MiraLax oral powder for reconstitution  -- 1 gram(s) by mouth once a day   -- Dilute this medication with liquid before administration.  It is very important that you take or use this exactly as directed.  Do not skip doses or discontinue unless directed by your doctor.    -- Indication: For laxative    midodrine 5 mg oral tablet  -- 1 tab(s) by mouth 3 times a day  -- Indication: For low blood pressure    memantine 10 mg oral tablet  -- 1 tab(s) by mouth once a day (at bedtime)  -- Indication: For Alzheimer's dementia with behavioral disturbance, unspecified timing of dementia onset    memantine 10 mg oral tablet  -- 1 tab(s) by mouth once a day  -- Indication: For Alzheimer disease    sodium chloride 0.65% nasal spray  -- 1 spray(s) in each affected nostril once a day  -- Indication: For dry nose    Multiple Vitamins oral tablet  -- 1 tab(s) by mouth once a day  -- Indication: For vitamin    cholecalciferol oral tablet  -- 1000 unit(s) by mouth 2 times a day  -- Indication: For vitamin

## 2018-03-01 ENCOUNTER — OUTPATIENT (OUTPATIENT)
Dept: OUTPATIENT SERVICES | Facility: HOSPITAL | Age: 83
LOS: 1 days | End: 2018-03-01
Payer: MEDICAID

## 2018-03-01 DIAGNOSIS — Z98.89 OTHER SPECIFIED POSTPROCEDURAL STATES: Chronic | ICD-10-CM

## 2018-03-01 DIAGNOSIS — N20.0 CALCULUS OF KIDNEY: Chronic | ICD-10-CM

## 2018-03-01 PROCEDURE — G9001: CPT

## 2018-03-06 DIAGNOSIS — R69 ILLNESS, UNSPECIFIED: ICD-10-CM

## 2018-03-12 ENCOUNTER — EMERGENCY (EMERGENCY)
Facility: HOSPITAL | Age: 83
LOS: 1 days | Discharge: HOME | End: 2018-03-12
Attending: EMERGENCY MEDICINE | Admitting: EMERGENCY MEDICINE
Payer: MEDICARE

## 2018-03-12 VITALS
DIASTOLIC BLOOD PRESSURE: 88 MMHG | OXYGEN SATURATION: 98 % | HEART RATE: 79 BPM | SYSTOLIC BLOOD PRESSURE: 129 MMHG | TEMPERATURE: 98 F | RESPIRATION RATE: 20 BRPM

## 2018-03-12 VITALS
DIASTOLIC BLOOD PRESSURE: 89 MMHG | RESPIRATION RATE: 17 BRPM | HEART RATE: 79 BPM | OXYGEN SATURATION: 97 % | SYSTOLIC BLOOD PRESSURE: 145 MMHG | TEMPERATURE: 98 F

## 2018-03-12 DIAGNOSIS — Z98.89 OTHER SPECIFIED POSTPROCEDURAL STATES: Chronic | ICD-10-CM

## 2018-03-12 DIAGNOSIS — N20.0 CALCULUS OF KIDNEY: Chronic | ICD-10-CM

## 2018-03-12 PROCEDURE — 99282 EMERGENCY DEPT VISIT SF MDM: CPT

## 2018-03-12 NOTE — ED PROVIDER NOTE - PROGRESS NOTE DETAILS
spoke with social work to get patient wound care set up for home Apply AllevynLife 1x q3 days or sooner prn. prior to putting on the dressing apply a small amount of Desitin. patient family declining ambulette. ready for dc. social work has arranged for home wound care. - Marisol Sultana PA-C

## 2018-03-12 NOTE — ED PROVIDER NOTE - PLAN OF CARE
Follow up with your Primary Care Physician within the next 2-3 days  Bring a copy of your test results with you to your appointment  Continue your current medication regimen  Return to the Emergency Room if you experience new or worsening symptoms  You will be contacted regarding home care and wound care - they have the instructions on how to manage the wound.

## 2018-03-12 NOTE — ED ADULT NURSE NOTE - CAS TRG GEN SKIN CONDITION
Xray was normal. It did not show any significant constipation or clear evidence for kidney stones.    Warm

## 2018-03-12 NOTE — ED ADULT NURSE NOTE - OBJECTIVE STATEMENT
84 y/o male A&Ox2 confused, PMH CVA, HLD, BPH, GI Bleed, nephrolithiasis, sinus bradycardia s/p multiple hospitalizations for falls, presents to ED from home with sacral pressure ulcers and worsening weakness. As per family bedside, pt was in rehab where family states "they weren't taking him to the bathroom" and his pressure ulcers were worsening. Pt was taken home, no improvement noted, brought ot ED today for worsening decubiti. Upon further assessment, airway patent and intact, denies chest pain/SOB. ABD soft nontender, denies n/v/d. Bilateral stage 2 sacral ulcers noted. Peripheral pulses strong and normal baseline sensation present x4. Safety and comfort measures maintained.

## 2018-03-12 NOTE — ED PROVIDER NOTE - OBJECTIVE STATEMENT
Patient is an 84 yo M w/ Alzheimer's Dementia, HLD, BPH, GI Bleed, nephrolithiasis, sinus bradycardia s/p loop recorder, multiple hospitalizations for falls now p/w decubitus ulcers. patient has been acting normally, no fever chills. noted these ulcerations over sacral area 4 days ago. no drainage from the site. family takes care of patient Patient is an 84 yo M w/ CVA, HLD, BPH, GI Bleed, nephrolithiasis, sinus bradycardia s/p loop recorder, multiple hospitalizations for falls now p/w decubitus ulcers. patient has been acting normally, no fever chills. noted these ulcerations over sacral area 4 days ago. no drainage from the site. family takes care of patient    Attendinyo male presents with stage 2 decubitus ulcer that was noted a few days ago.  pt was in a rehab facility and the family was not happy that patient was not being repositioned frequently enough.  no fever/chills.  no change in mental status.

## 2018-03-12 NOTE — ED PROVIDER NOTE - CARE PLAN
Principal Discharge DX:	Sacral ulcer  Assessment and plan of treatment:	Follow up with your Primary Care Physician within the next 2-3 days  Bring a copy of your test results with you to your appointment  Continue your current medication regimen  Return to the Emergency Room if you experience new or worsening symptoms  You will be contacted regarding home care and wound care - they have the instructions on how to manage the wound.

## 2018-03-12 NOTE — ED PROVIDER NOTE - PHYSICAL EXAMINATION
2 small 1 cm x 1 cm ulcerations over sacral area without significant surrounding cellulitis or drainage.

## 2018-10-09 NOTE — ED PROVIDER NOTE - CONSTITUTIONAL APPEARANCE HYGIENE, MLM
Spoke with Rachael informed her to cancel the Hpylori IGG blood test.     Dr. Palomino please confirm that you wanted this test canceled.     thanks Dory   well appearing

## 2018-12-22 NOTE — ED ADULT NURSE REASSESSMENT NOTE - NS ED NURSE REASSESS COMMENT FT1
Discharged summary & instruction was faxed over to the facility [phone]
Pt asleep on assigned stretcher shows no signs of any distress, no pain noted and vs WNL. Pt awaiting transportation at this time. Safety maintained & continue monitor
range of motion is not limited and there is no muscle tenderness.

## 2019-01-01 NOTE — ED ADULT NURSE NOTE - PUPILS PERRL
Subjective:      Patient ID: Glo Leung is a 2 wk. o. female. HPI  Informant: Mom-Cheryl    2 week [facility]    Switched to [employer] OAveso the same day as her last visit (They have [address]). She's not spitting up as much and she's eating well (4.5-6 oz at a time; parents mix the formula into an old 3 oz bottle but they thought it was a 2 oz bottle so they would fill it with water and do 1 scoop, though dad said he did 1.5 scoops). She's stooling daily now, not straining as much but seems to have hard stools, small volumes. Sister with HFM recently and then mom got fever and ulcers on her lips. No fevers or rash noted for [facility] yet. Diet History:  Formula:  Landen Lints Start Soothe   Oz per bottle:  3   Bottles per Day: 6-8; every 3 hours timed. Breast feeding:   no   Feedings every 2-3 hours   Spitting up:  mild    Sleep History:  Sleeps in :  Own bed?  yes    Parents bed? no    Back? yes    All night? yes    Awakens? 2 times to feed    Problems:  none    Development Screening:   Responds to face: yes   Responds to voice, sound: yes   Flexed posture: yes   Equal extremity movement: yes    Medications: All medications have been reviewed. Currently is not taking over-the-counter medication(s). Medication(s) currently being used have been reviewed and added to the medication list.    Review of Systems   Constitutional: Negative for fever. Cardiovascular: Negative for cyanosis. Gastrointestinal: Negative for diarrhea and vomiting. Skin: Negative for rash. Objective:   Physical Exam   Constitutional: She appears well-developed and well-nourished. She is active. No distress. HENT:   Head: Anterior fontanelle is flat. Nose: Nose normal. No nasal discharge. Mouth/Throat: Mucous membranes are moist. Oropharynx is clear. Eyes: Red reflex is present bilaterally. Pupils are equal, round, and reactive to light. Conjunctivae and EOM are normal. Right eye exhibits no discharge.
yes

## 2019-01-04 NOTE — PHYSICAL THERAPY INITIAL EVALUATION ADULT - PATIENT PROFILE REVIEW, REHAB EVAL
details… yes detailed exam normal strength/decreased ROM due to pain/no joint warmth/no calf tenderness/no joint erythema/no joint swelling

## 2019-07-17 PROBLEM — Z00.00 ENCOUNTER FOR PREVENTIVE HEALTH EXAMINATION: Status: ACTIVE | Noted: 2019-07-17

## 2019-08-13 NOTE — H&P ADULT - PROBLEM SELECTOR PLAN 4
Hip fracture requiring operative repair Patient has multiple reports of previous falls / no clear etiology identified   Patient was evaluated by Neurology and Psych on previous admission   will get PT evaluation   close monitoring  B12 and TSH were within range from 11/2017

## 2019-08-16 ENCOUNTER — NON-APPOINTMENT (OUTPATIENT)
Age: 84
End: 2019-08-16

## 2019-08-16 ENCOUNTER — APPOINTMENT (OUTPATIENT)
Dept: OPHTHALMOLOGY | Facility: CLINIC | Age: 84
End: 2019-08-16
Payer: MEDICARE

## 2019-08-16 PROCEDURE — 92002 INTRM OPH EXAM NEW PATIENT: CPT

## 2020-01-28 ENCOUNTER — APPOINTMENT (OUTPATIENT)
Dept: SURGERY | Facility: CLINIC | Age: 85
End: 2020-01-28
Payer: MEDICARE

## 2020-01-28 VITALS
DIASTOLIC BLOOD PRESSURE: 81 MMHG | HEIGHT: 65 IN | HEART RATE: 58 BPM | OXYGEN SATURATION: 97 % | WEIGHT: 150 LBS | RESPIRATION RATE: 15 BRPM | BODY MASS INDEX: 24.99 KG/M2 | SYSTOLIC BLOOD PRESSURE: 135 MMHG

## 2020-01-28 VITALS — TEMPERATURE: 98.6 F

## 2020-01-28 DIAGNOSIS — R10.30 LOWER ABDOMINAL PAIN, UNSPECIFIED: ICD-10-CM

## 2020-01-28 DIAGNOSIS — K40.90 UNILATERAL INGUINAL HERNIA, W/OUT OBSTRUCTION OR GANGRENE, NOT SPECIFIED AS RECURRENT: ICD-10-CM

## 2020-01-28 DIAGNOSIS — E78.00 PURE HYPERCHOLESTEROLEMIA, UNSPECIFIED: ICD-10-CM

## 2020-01-28 DIAGNOSIS — Z86.69 PERSONAL HISTORY OF OTHER DISEASES OF THE NERVOUS SYSTEM AND SENSE ORGANS: ICD-10-CM

## 2020-01-28 DIAGNOSIS — I95.9 HYPOTENSION, UNSPECIFIED: ICD-10-CM

## 2020-01-28 DIAGNOSIS — K21.9 GASTRO-ESOPHAGEAL REFLUX DISEASE W/OUT ESOPHAGITIS: ICD-10-CM

## 2020-01-28 DIAGNOSIS — Z91.81 HISTORY OF FALLING: ICD-10-CM

## 2020-01-28 DIAGNOSIS — Z82.49 FAMILY HISTORY OF ISCHEMIC HEART DISEASE AND OTHER DISEASES OF THE CIRCULATORY SYSTEM: ICD-10-CM

## 2020-01-28 PROCEDURE — 99203 OFFICE O/P NEW LOW 30 MIN: CPT

## 2020-01-28 RX ORDER — ASPIRIN 81 MG/1
81 TABLET, CHEWABLE ORAL
Refills: 0 | Status: ACTIVE | COMMUNITY

## 2020-01-28 RX ORDER — PSYLLIUM HUSK 0.4 G
CAPSULE ORAL
Refills: 0 | Status: ACTIVE | COMMUNITY

## 2020-01-28 RX ORDER — EZETIMIBE 10 MG/1
TABLET ORAL
Refills: 0 | Status: ACTIVE | COMMUNITY

## 2020-01-28 RX ORDER — MIDODRINE HYDROCHLORIDE 10 MG/1
TABLET ORAL
Refills: 0 | Status: ACTIVE | COMMUNITY

## 2020-01-28 RX ORDER — OMEPRAZOLE 20 MG/1
20 TABLET, DELAYED RELEASE ORAL
Refills: 0 | Status: ACTIVE | COMMUNITY

## 2020-01-28 NOTE — REASON FOR VISIT
[Consultation] : a consultation visit [FreeTextEntry1] : Referred by Dalton Tafoya, Left inguinal hernia

## 2020-01-28 NOTE — HISTORY OF PRESENT ILLNESS
[de-identified] : The patient is a 86-year-old gentleman with Alzheimer's dementia. He is non-ambulatory without assistance. His son states that he has intermittent swelling and discomfort in his left groin. He denies nausea or vomiting.he is unable to care for himself without 24/7 assistance

## 2020-01-28 NOTE — CONSULT LETTER
[Dear  ___] : Dear  [unfilled], [Consult Letter:] : I had the pleasure of evaluating your patient, [unfilled]. [Please see my note below.] : Please see my note below. [Consult Closing:] : Thank you very much for allowing me to participate in the care of this patient.  If you have any questions, please do not hesitate to contact me. [Sincerely,] : Sincerely, [FreeTextEntry3] : Gael Ortega M.D., F.A.C.S, F.A.S.C.R.S

## 2020-01-28 NOTE — PHYSICAL EXAM
[Normal Breath Sounds] : Normal breath sounds [Normal Heart Sounds] : normal heart sounds [Abdominal Masses] : No abdominal masses [No HSM] : no hepatosplenomegaly [No Rash or Lesion] : No rash or lesion [Alert] : alert [Calm] : calm [de-identified] : nad [de-identified] : Easily reducible and nontender left inguinal hernia. No palpable right inguinal hernia. [de-identified] : nl

## 2020-01-28 NOTE — ASSESSMENT
[FreeTextEntry1] : In summary the patient is 86 years old with Alzheimer's dementia. He has an overall poor quality of life and a reducible minimally symptomatic left inguinal hernia. I explained the risks of surgery including the risks of anesthesia. Also explained the risk of observation namely the risk of intestinal incarceration.  I explained to the patient's family that given his overall medical condition and relatively poor quality of life I feel the risk of surgery does not justify the benefit.

## 2020-02-12 ENCOUNTER — APPOINTMENT (OUTPATIENT)
Dept: OPHTHALMOLOGY | Facility: CLINIC | Age: 85
End: 2020-02-12
Payer: MEDICARE

## 2020-02-12 ENCOUNTER — NON-APPOINTMENT (OUTPATIENT)
Age: 85
End: 2020-02-12

## 2020-02-12 PROCEDURE — 92012 INTRM OPH EXAM EST PATIENT: CPT

## 2020-02-28 NOTE — PROVIDER CONTACT NOTE (OTHER) - DATE AND TIME:
04-Jan-2018 05:31
04-Jan-2018 22:00
08-Jan-2018 12:00
20-Dec-2017 23:00
22-Dec-2017 21:30
23-Dec-2017 22:00
27-Dec-2017 12:20
30-Dec-2017 05:15
I am unable to obtain a comprehensive history, review of systems, past medical history, and/or physical exam due to constraints imposed by the urgency of the patient's clinical condition and/or mental status.

## 2020-05-29 ENCOUNTER — APPOINTMENT (OUTPATIENT)
Dept: OPHTHALMOLOGY | Facility: CLINIC | Age: 85
End: 2020-05-29

## 2020-06-10 ENCOUNTER — EMERGENCY (EMERGENCY)
Facility: HOSPITAL | Age: 85
LOS: 1 days | Discharge: HOME | End: 2020-06-10
Attending: EMERGENCY MEDICINE
Payer: MEDICARE

## 2020-06-10 VITALS
OXYGEN SATURATION: 96 % | HEART RATE: 74 BPM | HEIGHT: 60 IN | WEIGHT: 134.92 LBS | DIASTOLIC BLOOD PRESSURE: 73 MMHG | TEMPERATURE: 99 F | SYSTOLIC BLOOD PRESSURE: 106 MMHG | RESPIRATION RATE: 18 BRPM

## 2020-06-10 DIAGNOSIS — Z98.89 OTHER SPECIFIED POSTPROCEDURAL STATES: Chronic | ICD-10-CM

## 2020-06-10 DIAGNOSIS — N20.0 CALCULUS OF KIDNEY: Chronic | ICD-10-CM

## 2020-06-10 PROCEDURE — 12011 RPR F/E/E/N/L/M 2.5 CM/<: CPT

## 2020-06-10 PROCEDURE — 99282 EMERGENCY DEPT VISIT SF MDM: CPT | Mod: 25

## 2020-06-10 PROCEDURE — 99283 EMERGENCY DEPT VISIT LOW MDM: CPT | Mod: 25

## 2020-06-10 NOTE — ED PROVIDER NOTE - PROGRESS NOTE DETAILS
ATTG: : wound well approximated and no bleeding. explained s/s to monitor for and return precautions.

## 2020-06-10 NOTE — ED ADULT NURSE NOTE - OBJECTIVE STATEMENT
86 year old male A&OX4 with a past medical hx of HTN, CAD, CHF, BPH, Dementia, presents with an abrasion to the right tragus with son states is atraumatic. Son states that he was helping his dad to the commode and upon placing him down noticed bleeding coming from the right external ear. Patient did not fall or receive any trauma prior to abrasion. Abrasion is not currently bleeding at this time. Son unsure of last tetanus injection. No nonverbal indicators of pain present.

## 2020-06-10 NOTE — ED PROVIDER NOTE - OBJECTIVE STATEMENT
86y M PMH Hypotension on Midodrine, CAD on ASA (with a loop recorder), Alzheimers A&Ox1 (to name) at baseline, BPH presenting with right ear abrasion. Pt's son who is accompanying him states that he placed his father on the commode this morning, left the room for a minute and came back to place him in his chair; when son placed pt in chair, he noticed he had some blood on his shirt and saw an injury to his right ear. Son states his father frequently itches his head due to itchiness but denies seeing any itching or any trauma leading to the injury. Son states pt has not had any recent travel/sick contacts and has not complained of any symptoms, other than a few loose bowel movements this week. Son denies any fevers/chills, chest pain, cough, SOB, palpitations, abdominal pain, N/V, blood in urine/stool, visual disturbances. 86y M PMH Hypotension on Midodrine, CAD on ASA (with a loop recorder), Alzheimers A&Ox1 (to name) at baseline, BPH presenting with right ear laceration. Pt's son who is accompanying him states that he placed his father on the commode this morning, left the room for a minute and came back to place him in his chair; when son placed pt in chair, he noticed he had some blood on his shirt and saw an injury to his right ear. Son states his father frequently itches his head due to itchiness but denies seeing any itching or any trauma leading to the injury. Son states pt has not had any recent travel/sick contacts and has not complained of any symptoms, other than a few loose bowel movements this week. Son denies any fevers/chills, chest pain, cough, SOB, palpitations, abdominal pain, N/V, blood in urine/stool, visual disturbances.

## 2020-06-10 NOTE — ED PROVIDER NOTE - PATIENT PORTAL LINK FT
You can access the FollowMyHealth Patient Portal offered by [facility] by registering at the following website: [email or web address]. By joining [facility]’s FollowMyHealth portal, you will also be able to view your health information using other applications (apps) compatible with our system.

## 2020-06-10 NOTE — ED ADULT NURSE NOTE - NSFALLRSKHARMRISK_ED_ALL_ED
Detail Level: Detailed Depth Of Biopsy: dermis Was A Bandage Applied: Yes Size Of Lesion In Cm: 0 Biopsy Type: H and E Biopsy Method: Dermablade Anesthesia Type: 1% lidocaine with epinephrine Anesthesia Volume In Cc: 0.5 Hemostasis: Drysol Wound Care: Vaniply Dressing: telfa dressing Destruction After The Procedure: No Type Of Destruction Used: Curettage Cryotherapy Text: The wound bed was treated with cryotherapy after the biopsy was performed. Electrodesiccation Text: The wound bed was treated with electrodesiccation after the biopsy was performed. Electrodesiccation And Curettage Text: The wound bed was treated with electrodesiccation and curettage after the biopsy was performed. Silver Nitrate Text: The wound bed was treated with silver nitrate after the biopsy was performed. Lab: [id number] Lab Facility: [id number] Consent: Written consent was obtained and risks were reviewed including but not limited to scarring, infection, bleeding, scabbing, incomplete removal, nerve damage and allergy to anesthesia. Post-Care Instructions: I reviewed with the patient in detail post-care instructions. Patient is to keep the biopsy site dry overnight, and then apply bacitracin twice daily until healed. Patient may apply hydrogen peroxide soaks to remove any crusting. Notification Instructions: Patient will be notified of biopsy results. However, patient instructed to call the office if not contacted within 2 weeks. Billing Type: Third-Party Bill yes

## 2020-06-10 NOTE — ED PROVIDER NOTE - ATTENDING CONTRIBUTION TO CARE
87 y/o m with pmhx Hypotension on Midodrine, CAD, Alzheimer's dementia, BPH presents by car with son for eval of bleeding from laceration on right ear. Per son he was with him this morning and after bathing and transferring to [facility], noted some blood around the ear. no head trauma. no loc.  Son states his father frequently itches his head due to itchiness but denies seeing any itching or any trauma leading to the injury. Son states pt has not had any recent travel/sick contacts and has not complained of any symptoms, other than a few loose bowel movements this week no fever or chills. no other bruises. son is the primary care taker. no evidence of malice.   PMD Dr. Dalton Tafoya  Gen.  no acute distress  HEENT:  perrl eomi, neck left side rotation preference. ear superficial laceration approx 2 cm linear in a vertical patter ant to the tragus.   Lungs:  b/l bs  CVS: S1S2   Abd;  soft non tender   Ext: no edema or erythema  Neuro: awake, non verbal during our interview but son states this is his baseline  MSK: moves ext spont. wheelchair bound.

## 2020-07-22 NOTE — ED ADULT NURSE NOTE - MUSCLE PAIN OR WEAKNESS
Headache not improved. Order obtained from Dr. Brii Gomez for one time dose of Imitrex.  Electronically signed by Morris Shone, RN on 7/22/20 at 5:46 PM EDT yes/leg weakness on ambulation

## 2020-08-26 NOTE — ED PROVIDER NOTE - CROS ED ROS STATEMENT
all other ROS negative except as per HPI Bcc Histology Text: There were numerous aggregates of basaloid cells.

## 2020-11-06 NOTE — INPATIENT CERTIFICATION FOR MEDICARE PATIENTS - CURRENT MEDICAL NEEDS AND CARE PLANS
Possible Home DR. BLOCH, ATTENDING MD-  I performed a face to face bedside interview with patient regarding history of present illness, review of symptoms and past medical history. I completed an independent physical exam.  I have discussed patient's plan of care with the resident.  patient examined, well appearing NAd HEENT nml heart sounds s1s2, mild epigastric tenderness. with burping. extrem no edema. skin nml.

## 2020-12-19 NOTE — PATIENT PROFILE ADULT. - NS PRO MODE OF ARRIVAL
Subjective   Patient ID: Mike is a 29 year old male.    Chief Complaint   Patient presents with   • Abdominal Pain     Last night between 9;30 and 10 PM the patient developed left sided pain; after eating.   The patient reports mild nausea but not vomiting. The pain is more flank and laterat abdominal.      HPI      MEDICATIONS:  Current Outpatient Medications   Medication Sig   • escitalopram (LEXAPRO) 20 MG tablet Take 20 mg by mouth daily.   • busPIRone (BUSPAR) 30 MG tablet Take 40 mg by mouth daily.     No current facility-administered medications for this visit.          ALLERGIES:  ALLERGIES:  No Known Allergies      MEDICAL HISTORY:  Past Medical History:   Diagnosis Date   • Anxiety    • Tourette's          PAST SURGICAL HISTORY:  Past Surgical History:   Procedure Laterality Date   • No past surgeries           FAMILY HISTORY:  Family History   Problem Relation Age of Onset   • Patient is unaware of any medical problems Mother    • Hypertension Father          SOCIAL HISTORY:  Social History     Tobacco Use   • Smoking status: Never Smoker   • Smokeless tobacco: Never Used   • Tobacco comment: 8 cig his entire life   Substance Use Topics   • Alcohol use: Yes     Frequency: Monthly or less   • Drug use: Yes     Types: Marijuana     Comment: everyday         Patient's medications, allergies, past medical, surgical, and social history  were reviewed and updated as appropriate.      Review of Systems      Objective     Physical Exam      Visit Vitals  BP (!) 130/95   Pulse 99   Temp 97.3 °F (36.3 °C) (Temporal)   Resp 18   Ht 5' 9\" (1.753 m)   Wt 102.1 kg (225 lb)   SpO2 99%   BMI 33.23 kg/m²       No results found for this visit on 12/19/20.     No image results found.       Assessment     Problem List Items Addressed This Visit     None                Instructions provided as documented in the AVS.   stretcher

## 2021-01-09 NOTE — ED PROVIDER NOTE - MUSCULOSKELETAL NEGATIVE STATEMENT, MLM
no back pain, no gout, no musculoskeletal pain, no neck pain, and no weakness.
no heavy lifting, pushing, pulling

## 2021-03-11 NOTE — ED PROVIDER NOTE - OBJECTIVE STATEMENT
Trend BG levels, renal indices, LFT's, electrolytes and triglycerides Patient is an 84 yo M w/ Alzheimer's Dementia (documented in previous admission as AAOx2 to 3), HLD, BPH, nephrolithiasis, sinus bradycardia s/p loop recorder, multiple hospitalizations for falls now p/w unwitnessed fall. Patient unable to recount events. Wife at bedside reports that she was called at 145AM and told that he fell. As per triage note, fall was unwitnessed  and patient was found sitting in his wheelchair but reported falling. Patient currently denies any CP, SOB, pain anywhere, fevers, chills. Patient endorses rhinnorhea and sneezing.

## 2021-09-23 NOTE — DISCHARGE NOTE ADULT - FUNCTIONAL SCREEN CURRENT LEVEL: BATHING, MLM
The types of intraocular lenses were reviewed with the patient along with a discussion of their various strengths and weaknesses. (2) assistive person

## 2021-10-19 NOTE — ED ADULT TRIAGE NOTE - WEIGHT IN LBS
Forms in Dr. Eng mailbox; Orders placed please let mom know when calling for her to  forms    
Mom called and form will be picked up today  
Patient's mom dropped of form for [facility] [employer] that need to be signed by Dr. Eng.  Patient was last seen 21 for Well visit.  Form in Dr. Eng's bin in [facility] [place].      Patient also needs orders for TB blood draw.  Please place orders and also extend  orders.      Please call mom to schedule labs when orders are placed and to  form.   [phone]  
Patient's mother would like  for by 10/19/2021.    Patient is to start the day program on 10/20/2021.  
Signed    Brought to nursing     
169.9

## 2021-10-20 NOTE — DIETITIAN INITIAL EVALUATION ADULT. - WEIGHT CHANGE
Also see result note from today.    There are minimal haziness at left lower lung.   This is likely atelectasias which is simply part of the lung not fully full of air due to shallow breathing.   However with the chronic cough we could try an antibiotic if she would like.   My choice would be azithromycin  
Franco sent to [facility]  
Patient notified via my chart     Karen Valdez, Registered Nurse, PAL (Patient Advocate Liason)   [facility]     
Pt would like to have the abx.    Dennise Marie. RN, BSN, PAL (Patient Advocate Liaison)  [facility]   [phone]    
She is wondering what you think about the xray results. Shiloh Guidry RN on 10/20/2021 at 8:02 AM    
no

## 2021-12-10 NOTE — PROVIDER CONTACT NOTE (OTHER) - REASON
Returned Miss Merlos's call, went to voicemail. Left message to call [facility] back to discuss INR result, dosing instruction and when next INR due.   Scabs like areas

## 2022-05-19 NOTE — ED ADULT NURSE NOTE - NSIMPLEMENTINTERV_GEN_ALL_ED
Implemented All Fall with Harm Risk Interventions:  [place] to call system. Call bell, personal items and telephone within reach. Instruct patient to call for assistance. Room bathroom lighting operational. Non-slip footwear when patient is off stretcher. Physically safe environment: no spills, clutter or unnecessary equipment. Stretcher in lowest position, wheels locked, appropriate side rails in place. Provide visual cue, wrist band, yellow gown, etc. Monitor gait and stability. Monitor for mental status changes and reorient to person, place, and time. Review medications for side effects contributing to fall risk. Reinforce activity limits and safety measures with patient and family. Provide visual clues: red socks.
no enlarged lymph nodes/no tender lymph nodes/no swelling of extremity

## 2022-06-23 NOTE — ED PROVIDER NOTE - OBJECTIVE STATEMENT
Head, normocephalic, atraumatic, Face, Face within normal limits, Ears, External ears within normal limits, Nose/Nasopharynx, External nose  normal appearance, nares patent, no nasal discharge, Mouth and Throat, Oral cavity appearance normal, Breath odor normal, Lips, Appearance normal
82 y/o M pt with PMHx of Alzheimer's, BPH, GERD, HLD, renal calculi c/o head injury s/p slip and fall. Pt states he was walking out of the bathroom and lost his footing. Fall was witnessed by his son. Pt normally walks with a walker. Pt had a previous fall in the past which had him admitted. As per wife, pt's BP has been very low. Pt's PMD wants pt's BP around 130. Denies LOC or any other complaints. Current medication: midodrine Allergic to penicillin, IV dye  PMD: Dr. Dalton Tafoya
PAST MEDICAL HISTORY:  No pertinent past medical history

## 2022-08-01 NOTE — PHYSICAL THERAPY INITIAL EVALUATION ADULT - HEALTH SCREEN CRITERIA
yes
You can access the FollowMyHealth Patient Portal offered by [facility] by registering at the following website: [email or web address]. By joining [facility]’s FollowMyHealth portal, you will also be able to view your health information using other applications (apps) compatible with our system.

## 2022-09-23 NOTE — ED PROVIDER NOTE - NSFOLLOWUPINSTRUCTIONS_ED_ALL_ED_FT
Pt ambulated from bed to restroom then into hallway on 2L NC, required CGA once in hallway and demonstrating MANRIQUE. Pt recommended to use RW but declined. Increased RR continued with standing rest break ~2 mins. Pt strongly encouraged to use RW instead of reaching out for furniture to ambulate and agreeable. Pt able to ambulate back to room with RW./3-point gait You were seen in the Emergency Room for a laceration to the tragus of the right ear, which was repaired with nonabsorbable sutures.   Please keep wound dry for 24-48 hours and then clean with soft wet gauze. After you may place Bacitracin ointment on it as well.  In 5-7 days, please follow up to ED or PCP for suture removal.  If you start to experience redness, drainage, increased swelling/pain, fevers or chills, please come back to the ED.

## 2023-06-08 NOTE — ED ADULT NURSE NOTE - HEIGHT IN FEET
[facility] INPATIENT ENCOUNTER  ENDOCRINOLOGY DIABETES PROGRESS NOTE    ADMISSION DATE:  6/6/2023  DATE:  6/8/2023  CURRENT HOSPITAL DAY:  Hospital Day: 3  ATTENDING PHYSICIAN:  Scott Carcamo MD      SUBJECTIVE:      HISTORY OF PRESENT ILLNESS:  Endocrine was asked to see Lilia Dixon at the request of Dr. Carcamo for evaluation and management of hyperglycemia.   Lilia Dixon is a 69 year old female patient admitted with hyperglycemia.     Patient was admitted to [facility] for hyperglycemia due to steroids 6/2/23-6/4/23  She was evaluated by Dr Chang, endocrinology on 6/3/23.   Portions of this note are brought forward from Dr Chang's note; reviewed and edited by me as appropriate.     She has a history of breast cancer that is metastatic. Has received keytruda in 2021. More recently fam-trastuzumab Aug [id number].  Currently on an aromatase inhibitor.  She follows with Dr Norton for thyroid issues and is currently on levothyroxine 175 mcg daily.   Had ongoing diarrhea for over a year and colon biopsy showed colitis for which she was started on budesonide in April --> led to hyperglycemia.    No previous history of diabetes. Small doses of insulin cause her blood sugars to drop in her last hospitalization.    Labs for am cortisol and ACTH to rule out iatrogenic adrenal insufficiency (ACTH 10.6; cortisol 5.7)    Interval History:  Patient is doing well; eating well  No further hypoglycemia   Patient was given juice this AM with medication and Humalog was given at that time; breakfast arrived 45 minutes later; discussed with RN staff.      Patient denies chest pain, SOB, nausea, emesis, fever, chills.    Social: Lives with daughter      Current Hospital Regimen:  Lantus 8 units daily  Humalog 2 units with correction scale    Current diet status: 2 Times/day Between Meals (am/pm); Ensure High Protein/high Protein Low Carbohydrate Supplement Oral Nutrition Supplement  Consistent Carb Moderate (45-75 Gm/meal); No Juices Unless  Having A Low Blood Sugar; No Sherbert; No Ice Cream Diet.    Blood sugars while hospitalized   Recent Labs   Lab 06/07/23  1723 06/07/23  2048 06/08/23  0808 06/08/23  1240   GLUCOSE BEDSIDE 253* 229* 191* 217*      Home Diabetic Regimen:   Was discharged home with glucometer and testing supplies  No meds     I reviewed medication list, allergies, past medical and surgical history and social history in chart.    OBJECTIVE:    VITAL SIGNS:     Vital Last Value 24 Hour Range   Temperature 97.8 °F (36.6 °C) (06/08/23 0805) Temp  Min: 97.8 °F (36.6 °C)  Max: 97.9 °F (36.6 °C)   Pulse 67 (06/08/23 1055) Pulse  Min: 52  Max: 67   Respiratory 16 (06/08/23 0805) Resp  Min: 16  Max: 16   Non-Invasive  Blood Pressure 111/67 (06/08/23 1055) BP  Min: 111/67  Max: 155/77   Pulse Oximetry 98 % (06/08/23 0805) SpO2  Min: 96 %  Max: 98 %     Vital Today Admitted   Weight 51.6 kg (113 lb 12.8 oz) (06/08/23 0600) Weight: 51.6 kg (113 lb 12.8 oz) (06/06/23 1756)   Height N/A Height: 5' 4\" (162.6 cm) (06/06/23 1756)   BMI (Body Mass Index) N/A BMI (Calculated): 19.53 (06/06/23 1756)     PHYSICAL EXAM:    GENERAL:  Pleasant, conversant. Resting in bed.  NEUROLOGIC: Alert and oriented times 4.  Speech clear.  PSYCHIATRIC: .  Normal mood and affect.    LABORATORY DATA:  Hemoglobin A1C (%)   Date Value   06/03/2023 8.1 (H)     TSH (mcUnits/mL)   Date Value   06/06/2023 0.346 (L)     Sodium (mmol/L)   Date Value   06/07/2023 140     Potassium (mmol/L)   Date Value   06/07/2023 3.7     Chloride (mmol/L)   Date Value   06/07/2023 111 (H)     Glucose (mg/dL)   Date Value   06/07/2023 158 (H)     Calcium (mg/dL)   Date Value   06/07/2023 8.6     Carbon Dioxide (mmol/L)   Date Value   06/07/2023 25     BUN (mg/dL)   Date Value   06/07/2023 27 (H)     Creatinine (mg/dL)   Date Value   06/07/2023 0.71         ASSESSMENT:  New onset diabetes mellitus with hyperglycemia, 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 Bradshaw.  Medical decision making per MD.    Check point inhibitors can cause hyperglycemia and pancreatic dysfunction. Typical picture can include elevated STEVE and low Cpeptide; Cpeptide is slightly low; STEVE TIM is still pending.    Recommend:  · continue Lantus 8 units daily  · continue Humalog scheduled dose 2 units with meals with correction scale  · Humalog Correction dose:  Blood Sugar less than 200 mg/dL - Give 0 units of Correction Dose  >200 mg/dL - GIVE 1 unit of Correction Dose  >300 mg/dL - GIVE 2 units of Correction Dose  · Continue to check blood sugars 4 times per day and at bedtime.  · Hypoglycemia protocol  · Continue Consistent carb diet; avoid sugary juices  · Discussed starting Dexcom; Patient is interest in CGM.  Patient is unable to download Dexcom apps currently since she didn't know her password.  · labs STEVE AB - pending    I taught patient insulin injections with demo insulin pen and supplies.  Patient returned demonstrated and is competent in giving self-insulin injections.  I reviewed with patient injections sites, important insulin tips, insulin action times and storage.  I also reviewed hypoglycemia protocol and treatment, wearing identification and checking blood sugar before driving.  Patient understands and has no further questions or concerns.    Patient watched insulin injection video.  Patient did insulin self injection with nursing staff in prep for home.   I gave patient \"Understanding Insulin Guide\" packet and reviewed in detail with her.    As for hypothyroidism, continue home dose.     DISCHARGE PLANNING:  • Continue hospital regimen for discharge: for both lantus and  humalog   • Pt has diabetic supplies: Glucometer, test strips, lancets.  • Continue current LT4 upon discharge.  • Follow  up: Pt has endocrinology at [facility] in 2-4 weeks.   • Need follow up with diabetic education in 1 week  • Recommend starting Dexcom as  outpatient      Socorro Cedeño PA-C  Discussed and supervised by Dr. Edith Bradshaw               5

## 2024-01-10 NOTE — ED PROVIDER NOTE - CROS ED EYES ALL NEG
OFFICE VISIT      Patient: Corby Mcallister Date of Service: 1/10/2024   : 1989 MRN: [id number]     SUBJECTIVE:     CHIEF COMPLAINT:  Chief Complaint   Patient presents with    Office Visit    Annual Exam    New Patient     Last seen .    Cough     For 2-3 months.    Breast Problem     Occasional Right Breast Pain starting 5 months ago.         HISTORY OF PRESENT ILLNESS:  Corby Mcallister is a 34 year old female who presents today for an annual physical exam     Last seen in the office in 2019     was used during this visit      Patient has multiple complaints she would like to discuss    C/o cough - for the last 2-3 months  With phlegm - whitish/greenish    Has h/o bronchitis in the past  Feels mucus in her throat  CXR done in 2023 - normal      C/o RT breast pain/discomfort for the last 5 months  Seen by GYN - had mammogram/US done - reports WNL, advised to return at age 40 - records are not available, had it done [facility] - will get results       C/o chronic upper abd pain, intromittent, was worse during last pregnancy (2 years ago), aggravates with food intake? Any food? 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    montelukast (SINGULAIR) 10 MG tablet Take 1 tablet by  mouth nightly.    vitamin D3 (CHOLECALCIFEROL) 1.25 mg (50,000 units) capsule Take 1.25 mg by mouth 1 day a week.    azithromycin (ZITHROMAX) 250 MG tablet Take 2 tablets by mouth daily for 1 day, THEN 1 tablet daily for 4 days.     No current facility-administered medications for this visit.       ALLERGIES:  ALLERGIES:  No Known Allergies    FAMILY HISTORY:  Family History   Problem Relation Age of Onset    Patient is unaware of any medical problems Mother     Patient is unaware of any medical problems Father     Patient is unaware of any medical problems Sister     Patient is unaware of any medical problems Brother        SOCIAL HISTORY:  Social History     Tobacco Use    Smoking status: Never    Smokeless tobacco: Never   Vaping Use    Vaping Use: never used   Substance Use Topics    Alcohol use: No    Drug use: No         Review of Systems   Constitutional: Negative.    HENT:  Negative for postnasal drip.    Eyes: Negative.    Respiratory:  Positive for cough. Negative for shortness of breath and wheezing.    Gastrointestinal: Negative.    Endocrine: Negative.    Genitourinary: Negative.    Musculoskeletal: Negative.    Skin: Negative.    Neurological: Negative.    Hematological: Negative.    Psychiatric/Behavioral:  Negative for sleep disturbance. The patient is nervous/anxious.          OBJECTIVE:     Visit Vitals  /72   Pulse 82   Temp 96.6 °F (35.9 °C)   Ht 5' 4.75\" (1.645 m)   Wt 69.9 kg (154 lb)   LMP 12/17/2023 (Exact Date)   SpO2 97%   BMI 25.83 kg/m²         Wt Readings from Last 1 Encounters:   01/10/24 69.9 kg (154 lb)         General appearance: alert, well developed, in no acute distress, vital signs reviewed      Head and Face: atraumatic, no deformities, normocephalic, normal facies      Eyes: normal conjunctivae, sclera, no periorbital edema, EOMI      Neck: supple, thyroid fullness       Ears/Nose/Mouth/Throat: B/L ears - external canals no erythema, TM translucent, no bulging or  retraction, no mouth lesions, mucosa pink and moist, pharynx - no erythema, no exudates  Breasts - Left breas WNL, RT breast - postsurgical scar, tender nodule at 8 o'clock, close to nipple  Lymphatic: no lymphadenopathy.   Pulmonary:  clear to auscultation bilaterally.   Cardiovascular: RRR, no murmur, no edema B/L LE  Abdomen: soft, nontender, nondistended, normal bowel sounds and no abdominal mass. no hepatomegaly and no splenomegaly. no umbilical hernia was discovered.   Neurologic: cranial nerves grossly intact. no sensory deficits noted. no coordination deficits. normal gait. muscle strength and tone were normal. DTR's normal and symmetrical bilaterally   Musculoskeletal: Normal range of movement and no joint abnormality noted  Hematologic: no excessive bruising noted  SKIN: normal turgor, no skin rashes  Psychiatric: oriented to time, self and place      DIAGNOSTIC STUDIES:     LAB RESULTS:    Lab Results Reviewed,   Lab Results   Component Value Date    SODIUM 141 02/20/2019    POTASSIUM 4.5 02/20/2019    CHLORIDE 106 02/20/2019    CO2 27 02/20/2019    BUN 10 02/20/2019    CREATININE 0.56 02/20/2019    GLUCOSE 102 (H) 02/20/2019   ,   Lab Results   Component Value Date    WBC 7.1 01/10/2024    HCT 39.3 01/10/2024    HGB 12.5 01/10/2024     01/10/2024   , No results found for: \"CHOLESTEROL\", \"HDL\", \"CALCLDL\", \"TRIGLYCERIDE\",   Lab Results   Component Value Date    AST 10 02/20/2019    GPT 15 02/20/2019    ALKPT 47 02/20/2019    BILIRUBIN 0.5 02/20/2019   , and No results found for: \"COL\", \"UAPP\", \"USPG\", \"UPH\", \"UPROT\", \"UGLU\", \"UKET\", \"UBILI\", \"URBC\", \"UNITR\", \"UROB\", \"UWBC\"    Hemoglobin A1C (%)   Date Value   01/10/2024 5.2     TSH (mcUnits/mL)   Date Value   02/20/2019 0.992   11/06/2017 1.311        Assessment AND PLAN:     Diagnoses and all orders for this visit:  Annual physical exam  -     CBC with Automated Differential; Future  -     Comprehensive Metabolic Panel; Future  -      Glycohemoglobin; Future  -     Lipid Panel With Reflex; Future  -     Thyroid Stimulating Hormone Reflex; Future  -     Vitamin D -25 Hydroxy; Future  -     Vitamin B12; Future  -     Iron And total Iron Binding Capacity; Future  -     Ferritin; Future  -     Hepatitis Serology Panel Chronic with Reflex HCV PCR; Future  Gallbladder polyp  -     US LIVER GALLBLADDER PANCREAS (RUQ); Future  Pain, abdominal, epigastric  -     SERVICE TO GASTROENTEROLOGY  Breast pain, right  -     SERVICE TO SURGERY BREAST  Thyroid fullness  -     US THYROID; Future  Chronic cough  -     XR CHEST PA AND LATERAL 2 VIEWS; Future  Anxiety  Other orders  -     montelukast (SINGULAIR) 10 MG tablet; Take 1 tablet by mouth nightly.  -     azithromycin (ZITHROMAX) 250 MG tablet; Take 2 tablets by mouth daily for 1 day, THEN 1 tablet daily for 4 days.      Pain, abdominal, epigastric  Symptoms are chronic, intermittent  Will repeat gallbladder US (had tiny polyp)  HIDA was discussed last time (2019), not done  GI evaluation is advised     Gallbladder polyp  Liver US    Annual physical exam  Declined any vaccines    Fasting labs  PAP  per GYN - will get records        Breast pain, right  RT breast pain  Had Mammogram and US done - will request records  Breast Sx eval advised     Chronic cough  CXR  Zpack  Singulair     Thyroid fullness  Thyroid US ordered     Anxiety  Denies being depressed  Stressed out and overwhelmed with taking care of 4 children with minimal support  Denies SI/HI  Tx options discussed, advised against prn meds  P-t declined daily meds (Zoloft)  Need more support from family  Counseling provided        Return in about 4 months (around 5/10/2024).    Instructions provided as documented in the Visit Summary.    Medical compliance with plan discussed and risk of noncompliance reviewed.    Patient education completed on disease process, etiology and prognosis.    Return to clinic as clinically indicated was discussed with patient  who verbalized understanding of and agreement with the plan.    Proper usage and all side effects of medications reviewed with patient who expressed understanding.       Rosina Velasquez MD  1/10/2024    negative...

## 2024-02-28 NOTE — DIETITIAN INITIAL EVALUATION ADULT. - FEEDING SKILL
minimal assistance/age appropriate assistance Patient was seen and examined at bedside  Cxr was repeated today with worsening CHF  Will continue with aggressive diuresis  Patient remains weak and unable to lay flat or get out of bed  Continue lasix at 80 BID  Discussed with daughter at bedside Pt is more somnolent today. She wasn't able to sleep well overnight.  She remains hypervolemic, agree with uptitrating diuretics.  Will continue GDMT optimization.  Recommend PT c/s as pt remains very weak.

## 2024-11-04 NOTE — DISCHARGE NOTE ADULT - NSTOBACCONEVERSMOKERY/N_GEN_A
Patient discharged home. Discharge AVS, follow-up care and medications discussed with patient. Medications e-prescribed to pharmacy of choosing. All questions answered.    No

## 2025-02-28 NOTE — ED ADULT NURSE NOTE - TEMPLATE
Care Transitions Program Week 2 Follow-up Call    Current Issues/Problems reviewed on call: Writer called and spoke to patient's daughter and asked if she was still planning on calling writer when she was with her mom.  The daughter apologized and stated that she has been keeping her distance as her and her family have been sick.  She states that he mom is currently doing well and they have no questions and will try to reach back out to writer.    Details of Interventions/Education completed on call: Continue with current POC    The patient is taking all medications as prescribed. The patient did not have questions or concerns regarding the medications prescribed.    Transitional Care Management (TCM) appointment was completed.  TCM appointment plan of care and upcoming appointments were reviewed with patient.  Transportation to upcoming appointments to be provided by daughter.    Week 3 Follow-Up  Review chart prior to outreach call to ensure no gaps in care. Complete assessment and notify and collaborate with multi-disciplinary team as needed regarding any concerns. Offer support, compassion, education, resources as needed   
Fall

## 2025-03-17 NOTE — PATIENT PROFILE ADULT. - AS [facility]
Problem: Chronic Conditions and Co-morbidities  Goal: Patient's chronic conditions and co-morbidity symptoms are monitored and maintained or improved  3/17/2025 0033 by Thuy Pereira RN  Outcome: Progressing  3/16/2025 1640 by Leonor Wheeler RN  Outcome: Progressing     Problem: Safety - Adult  Goal: Free from fall injury  3/17/2025 0033 by Thuy Pereira RN  Outcome: Progressing  3/16/2025 1640 by Leonor Wheeler RN  Outcome: Progressing     Problem: ABCDS Injury Assessment  Goal: Absence of physical injury  3/17/2025 0033 by Thuy Pereira RN  Outcome: Progressing  Flowsheets (Taken 3/17/2025 0032)  Absence of Physical Injury: Implement safety measures based on patient assessment  3/16/2025 1640 by Leonor Wheeler RN  Outcome: Progressing     Problem: Skin/Tissue Integrity  Goal: Skin integrity remains intact  Description: 1.  Monitor for areas of redness and/or skin breakdown  2.  Assess vascular access sites hourly  3.  Every 4-6 hours minimum:  Change oxygen saturation probe site  4.  Every 4-6 hours:  If on nasal continuous positive airway pressure, respiratory therapy assess nares and determine need for appliance change or resting period  3/16/2025 1640 by Leonor Wheeler RN  Outcome: Progressing     Problem: Nutrition Deficit:  Goal: Optimize nutritional status  3/16/2025 1640 by Leonor Wheeler RN  Outcome: Progressing      (3) slightly limited